# Patient Record
Sex: FEMALE | Race: WHITE | NOT HISPANIC OR LATINO | Employment: UNEMPLOYED | URBAN - METROPOLITAN AREA
[De-identification: names, ages, dates, MRNs, and addresses within clinical notes are randomized per-mention and may not be internally consistent; named-entity substitution may affect disease eponyms.]

---

## 2016-02-16 LAB
HEP C HIM: NORMAL
TSH SERPL-ACNC: 0.66 UIU/ML (ref 0.45–4.5)

## 2016-11-23 LAB
ALT SERPL-CCNC: 24 U/L (ref 0–69)
AST SERPL-CCNC: 22 U/L (ref 0–66)

## 2017-01-06 ENCOUNTER — TRANSFERRED RECORDS (OUTPATIENT)
Dept: HEALTH INFORMATION MANAGEMENT | Facility: CLINIC | Age: 47
End: 2017-01-06

## 2017-01-20 ENCOUNTER — TRANSFERRED RECORDS (OUTPATIENT)
Dept: HEALTH INFORMATION MANAGEMENT | Facility: CLINIC | Age: 47
End: 2017-01-20

## 2017-02-01 LAB
CREAT SERPL-MCNC: 0.73 MG/DL (ref 0.5–1.1)
GFR SERPL CREATININE-BSD FRML MDRD: 99 ML/MIN/1.73M2
GLUCOSE SERPL-MCNC: 98 MG/DL (ref 65–99)
HBA1C MFR BLD: 5.7 % (ref 4–6)
POTASSIUM SERPL-SCNC: 4.2 MMOL/L (ref 3.5–5.3)
TSH SERPL-ACNC: 3.99 UIU/ML (ref 0.3–5)

## 2017-02-10 ENCOUNTER — TRANSFERRED RECORDS (OUTPATIENT)
Dept: HEALTH INFORMATION MANAGEMENT | Facility: CLINIC | Age: 47
End: 2017-02-10

## 2017-03-03 ENCOUNTER — TRANSFERRED RECORDS (OUTPATIENT)
Dept: HEALTH INFORMATION MANAGEMENT | Facility: CLINIC | Age: 47
End: 2017-03-03

## 2017-03-03 LAB
HPV ABSTRACT: ABNORMAL
PAP SMEAR - HIM PATIENT REPORTED: NEGATIVE

## 2017-03-21 ENCOUNTER — RECORDS - HEALTHEAST (OUTPATIENT)
Dept: ADMINISTRATIVE | Facility: OTHER | Age: 47
End: 2017-03-21

## 2017-03-23 ENCOUNTER — TRANSFERRED RECORDS (OUTPATIENT)
Dept: HEALTH INFORMATION MANAGEMENT | Facility: CLINIC | Age: 47
End: 2017-03-23

## 2017-03-27 PROBLEM — N83.209 OVARIAN CYST: Status: ACTIVE | Noted: 2017-03-27

## 2017-04-03 RX ORDER — METFORMIN HCL 500 MG
1000 TABLET, EXTENDED RELEASE 24 HR ORAL 2 TIMES DAILY WITH MEALS
COMMUNITY

## 2017-04-06 ENCOUNTER — ANESTHESIA (OUTPATIENT)
Dept: SURGERY | Facility: CLINIC | Age: 47
End: 2017-04-06
Payer: COMMERCIAL

## 2017-04-06 ENCOUNTER — HOSPITAL ENCOUNTER (OUTPATIENT)
Facility: CLINIC | Age: 47
Discharge: HOME | End: 2017-04-07
Attending: SPECIALIST | Admitting: SPECIALIST
Payer: COMMERCIAL

## 2017-04-06 ENCOUNTER — ANESTHESIA EVENT (OUTPATIENT)
Dept: SURGERY | Facility: CLINIC | Age: 47
End: 2017-04-06
Payer: COMMERCIAL

## 2017-04-06 DIAGNOSIS — N83.201 CYST OF RIGHT OVARY: Primary | ICD-10-CM

## 2017-04-06 PROBLEM — H59.89: Status: ACTIVE | Noted: 2017-04-06

## 2017-04-06 LAB
GLUCOSE BLDC GLUCOMTR-MCNC: 118 MG/DL (ref 70–99)
HCG SERPL QL: NEGATIVE

## 2017-04-06 PROCEDURE — 36000056 ZZH SURGERY LEVEL 3 1ST 30 MIN: Performed by: SPECIALIST

## 2017-04-06 PROCEDURE — 88305 TISSUE EXAM BY PATHOLOGIST: CPT | Performed by: SPECIALIST

## 2017-04-06 PROCEDURE — 84703 CHORIONIC GONADOTROPIN ASSAY: CPT | Performed by: ANESTHESIOLOGY

## 2017-04-06 PROCEDURE — 25000125 ZZHC RX 250: Performed by: NURSE ANESTHETIST, CERTIFIED REGISTERED

## 2017-04-06 PROCEDURE — 25000128 H RX IP 250 OP 636: Performed by: ANESTHESIOLOGY

## 2017-04-06 PROCEDURE — 36000058 ZZH SURGERY LEVEL 3 EA 15 ADDTL MIN: Performed by: SPECIALIST

## 2017-04-06 PROCEDURE — 37000009 ZZH ANESTHESIA TECHNICAL FEE, EACH ADDTL 15 MIN: Performed by: SPECIALIST

## 2017-04-06 PROCEDURE — 25000125 ZZHC RX 250: Performed by: SPECIALIST

## 2017-04-06 PROCEDURE — 37000008 ZZH ANESTHESIA TECHNICAL FEE, 1ST 30 MIN: Performed by: SPECIALIST

## 2017-04-06 PROCEDURE — 40000935 ZZH STATISTIC OUTPATIENT (NON-OBS) EVE

## 2017-04-06 PROCEDURE — 36415 COLL VENOUS BLD VENIPUNCTURE: CPT | Performed by: ANESTHESIOLOGY

## 2017-04-06 PROCEDURE — 40000170 ZZH STATISTIC PRE-PROCEDURE ASSESSMENT II: Performed by: SPECIALIST

## 2017-04-06 PROCEDURE — 25000125 ZZHC RX 250: Performed by: ANESTHESIOLOGY

## 2017-04-06 PROCEDURE — 25000132 ZZH RX MED GY IP 250 OP 250 PS 637: Performed by: SPECIALIST

## 2017-04-06 PROCEDURE — 25000128 H RX IP 250 OP 636: Performed by: NURSE ANESTHETIST, CERTIFIED REGISTERED

## 2017-04-06 PROCEDURE — 71000012 ZZH RECOVERY PHASE 1 LEVEL 1 FIRST HR: Performed by: SPECIALIST

## 2017-04-06 PROCEDURE — 88305 TISSUE EXAM BY PATHOLOGIST: CPT | Mod: 26 | Performed by: SPECIALIST

## 2017-04-06 PROCEDURE — 25800025 ZZH RX 258: Performed by: NURSE ANESTHETIST, CERTIFIED REGISTERED

## 2017-04-06 PROCEDURE — 82962 GLUCOSE BLOOD TEST: CPT

## 2017-04-06 PROCEDURE — 40000936 ZZH STATISTIC OUTPATIENT (NON-OBS) NIGHT

## 2017-04-06 PROCEDURE — 25000132 ZZH RX MED GY IP 250 OP 250 PS 637: Performed by: ANESTHESIOLOGY

## 2017-04-06 PROCEDURE — 27210794 ZZH OR GENERAL SUPPLY STERILE: Performed by: SPECIALIST

## 2017-04-06 PROCEDURE — 25000128 H RX IP 250 OP 636: Performed by: SPECIALIST

## 2017-04-06 PROCEDURE — 71000013 ZZH RECOVERY PHASE 1 LEVEL 1 EA ADDTL HR: Performed by: SPECIALIST

## 2017-04-06 PROCEDURE — 25000566 ZZH SEVOFLURANE, EA 15 MIN: Performed by: SPECIALIST

## 2017-04-06 PROCEDURE — 25000132 ZZH RX MED GY IP 250 OP 250 PS 637

## 2017-04-06 RX ORDER — OXYCODONE HYDROCHLORIDE 5 MG/1
5 TABLET ORAL ONCE
Status: COMPLETED | OUTPATIENT
Start: 2017-04-06 | End: 2017-04-06

## 2017-04-06 RX ORDER — OXYCODONE AND ACETAMINOPHEN 5; 325 MG/1; MG/1
1-2 TABLET ORAL EVERY 4 HOURS PRN
Status: DISCONTINUED | OUTPATIENT
Start: 2017-04-06 | End: 2017-04-07 | Stop reason: HOSPADM

## 2017-04-06 RX ORDER — PROPOFOL 10 MG/ML
INJECTION, EMULSION INTRAVENOUS CONTINUOUS PRN
Status: DISCONTINUED | OUTPATIENT
Start: 2017-04-06 | End: 2017-04-06

## 2017-04-06 RX ORDER — METFORMIN HCL 500 MG
1000 TABLET, EXTENDED RELEASE 24 HR ORAL 2 TIMES DAILY WITH MEALS
Status: DISCONTINUED | OUTPATIENT
Start: 2017-04-06 | End: 2017-04-07 | Stop reason: HOSPADM

## 2017-04-06 RX ORDER — ONDANSETRON 2 MG/ML
INJECTION INTRAMUSCULAR; INTRAVENOUS PRN
Status: DISCONTINUED | OUTPATIENT
Start: 2017-04-06 | End: 2017-04-06

## 2017-04-06 RX ORDER — DEXAMETHASONE SODIUM PHOSPHATE 4 MG/ML
INJECTION, SOLUTION INTRA-ARTICULAR; INTRALESIONAL; INTRAMUSCULAR; INTRAVENOUS; SOFT TISSUE PRN
Status: DISCONTINUED | OUTPATIENT
Start: 2017-04-06 | End: 2017-04-06

## 2017-04-06 RX ORDER — MEPERIDINE HYDROCHLORIDE 25 MG/ML
12.5 INJECTION INTRAMUSCULAR; INTRAVENOUS; SUBCUTANEOUS
Status: DISCONTINUED | OUTPATIENT
Start: 2017-04-06 | End: 2017-04-06

## 2017-04-06 RX ORDER — FENTANYL CITRATE 50 UG/ML
25-50 INJECTION, SOLUTION INTRAMUSCULAR; INTRAVENOUS
Status: DISCONTINUED | OUTPATIENT
Start: 2017-04-06 | End: 2017-04-06

## 2017-04-06 RX ORDER — FENTANYL CITRATE 50 UG/ML
25-50 INJECTION, SOLUTION INTRAMUSCULAR; INTRAVENOUS EVERY 5 MIN PRN
Status: DISCONTINUED | OUTPATIENT
Start: 2017-04-06 | End: 2017-04-06 | Stop reason: HOSPADM

## 2017-04-06 RX ORDER — KETOROLAC TROMETHAMINE 30 MG/ML
30 INJECTION, SOLUTION INTRAMUSCULAR; INTRAVENOUS EVERY 6 HOURS PRN
Status: DISCONTINUED | OUTPATIENT
Start: 2017-04-06 | End: 2017-04-06

## 2017-04-06 RX ORDER — KETAMINE HYDROCHLORIDE 10 MG/ML
10 INJECTION, SOLUTION INTRAMUSCULAR; INTRAVENOUS ONCE
Status: COMPLETED | OUTPATIENT
Start: 2017-04-06 | End: 2017-04-06

## 2017-04-06 RX ORDER — FENTANYL CITRATE 50 UG/ML
INJECTION, SOLUTION INTRAMUSCULAR; INTRAVENOUS PRN
Status: DISCONTINUED | OUTPATIENT
Start: 2017-04-06 | End: 2017-04-06

## 2017-04-06 RX ORDER — KETOROLAC TROMETHAMINE 30 MG/ML
30 INJECTION, SOLUTION INTRAMUSCULAR; INTRAVENOUS EVERY 6 HOURS PRN
Status: DISCONTINUED | OUTPATIENT
Start: 2017-04-06 | End: 2017-04-07 | Stop reason: HOSPADM

## 2017-04-06 RX ORDER — ONDANSETRON 2 MG/ML
4 INJECTION INTRAMUSCULAR; INTRAVENOUS EVERY 6 HOURS PRN
Status: DISCONTINUED | OUTPATIENT
Start: 2017-04-06 | End: 2017-04-07 | Stop reason: HOSPADM

## 2017-04-06 RX ORDER — OXYCODONE AND ACETAMINOPHEN 5; 325 MG/1; MG/1
1-2 TABLET ORAL
Status: DISCONTINUED | OUTPATIENT
Start: 2017-04-06 | End: 2017-04-06

## 2017-04-06 RX ORDER — SODIUM CHLORIDE, SODIUM LACTATE, POTASSIUM CHLORIDE, CALCIUM CHLORIDE 600; 310; 30; 20 MG/100ML; MG/100ML; MG/100ML; MG/100ML
INJECTION, SOLUTION INTRAVENOUS CONTINUOUS PRN
Status: DISCONTINUED | OUTPATIENT
Start: 2017-04-06 | End: 2017-04-06

## 2017-04-06 RX ORDER — HYDROMORPHONE HYDROCHLORIDE 1 MG/ML
0.2 INJECTION, SOLUTION INTRAMUSCULAR; INTRAVENOUS; SUBCUTANEOUS
Status: DISCONTINUED | OUTPATIENT
Start: 2017-04-06 | End: 2017-04-07 | Stop reason: HOSPADM

## 2017-04-06 RX ORDER — GLYCOPYRROLATE 0.2 MG/ML
INJECTION, SOLUTION INTRAMUSCULAR; INTRAVENOUS PRN
Status: DISCONTINUED | OUTPATIENT
Start: 2017-04-06 | End: 2017-04-06

## 2017-04-06 RX ORDER — LIDOCAINE HYDROCHLORIDE 20 MG/ML
INJECTION, SOLUTION INFILTRATION; PERINEURAL PRN
Status: DISCONTINUED | OUTPATIENT
Start: 2017-04-06 | End: 2017-04-06

## 2017-04-06 RX ORDER — NALOXONE HYDROCHLORIDE 0.4 MG/ML
.1-.4 INJECTION, SOLUTION INTRAMUSCULAR; INTRAVENOUS; SUBCUTANEOUS
Status: DISCONTINUED | OUTPATIENT
Start: 2017-04-06 | End: 2017-04-06

## 2017-04-06 RX ORDER — OXYCODONE AND ACETAMINOPHEN 5; 325 MG/1; MG/1
1-2 TABLET ORAL EVERY 4 HOURS PRN
Qty: 20 TABLET | Refills: 0 | Status: SHIPPED | OUTPATIENT
Start: 2017-04-06 | End: 2018-09-19

## 2017-04-06 RX ORDER — KETOROLAC TROMETHAMINE 30 MG/ML
INJECTION, SOLUTION INTRAMUSCULAR; INTRAVENOUS PRN
Status: DISCONTINUED | OUTPATIENT
Start: 2017-04-06 | End: 2017-04-06

## 2017-04-06 RX ORDER — ONDANSETRON 4 MG/1
4 TABLET, ORALLY DISINTEGRATING ORAL EVERY 6 HOURS PRN
Status: DISCONTINUED | OUTPATIENT
Start: 2017-04-06 | End: 2017-04-07 | Stop reason: HOSPADM

## 2017-04-06 RX ORDER — NEOSTIGMINE METHYLSULFATE 1 MG/ML
VIAL (ML) INJECTION PRN
Status: DISCONTINUED | OUTPATIENT
Start: 2017-04-06 | End: 2017-04-06

## 2017-04-06 RX ORDER — BUPIVACAINE HYDROCHLORIDE AND EPINEPHRINE 5; 5 MG/ML; UG/ML
INJECTION, SOLUTION PERINEURAL PRN
Status: DISCONTINUED | OUTPATIENT
Start: 2017-04-06 | End: 2017-04-06 | Stop reason: HOSPADM

## 2017-04-06 RX ORDER — PROPOFOL 10 MG/ML
INJECTION, EMULSION INTRAVENOUS PRN
Status: DISCONTINUED | OUTPATIENT
Start: 2017-04-06 | End: 2017-04-06

## 2017-04-06 RX ORDER — NALOXONE HYDROCHLORIDE 0.4 MG/ML
.1-.4 INJECTION, SOLUTION INTRAMUSCULAR; INTRAVENOUS; SUBCUTANEOUS
Status: DISCONTINUED | OUTPATIENT
Start: 2017-04-06 | End: 2017-04-07 | Stop reason: HOSPADM

## 2017-04-06 RX ORDER — ONDANSETRON 4 MG/1
4 TABLET, ORALLY DISINTEGRATING ORAL EVERY 30 MIN PRN
Status: DISCONTINUED | OUTPATIENT
Start: 2017-04-06 | End: 2017-04-06

## 2017-04-06 RX ORDER — BUPIVACAINE HYDROCHLORIDE AND EPINEPHRINE 5; 5 MG/ML; UG/ML
INJECTION, SOLUTION EPIDURAL; INTRACAUDAL; PERINEURAL
Status: DISCONTINUED
Start: 2017-04-06 | End: 2017-04-06 | Stop reason: HOSPADM

## 2017-04-06 RX ORDER — ACETAMINOPHEN 10 MG/ML
1000 INJECTION, SOLUTION INTRAVENOUS ONCE
Status: COMPLETED | OUTPATIENT
Start: 2017-04-06 | End: 2017-04-06

## 2017-04-06 RX ORDER — SODIUM CHLORIDE, SODIUM LACTATE, POTASSIUM CHLORIDE, CALCIUM CHLORIDE 600; 310; 30; 20 MG/100ML; MG/100ML; MG/100ML; MG/100ML
INJECTION, SOLUTION INTRAVENOUS CONTINUOUS
Status: DISCONTINUED | OUTPATIENT
Start: 2017-04-06 | End: 2017-04-06

## 2017-04-06 RX ORDER — HYDROXYZINE HYDROCHLORIDE 25 MG/1
50 TABLET, FILM COATED ORAL ONCE
Status: COMPLETED | OUTPATIENT
Start: 2017-04-06 | End: 2017-04-06

## 2017-04-06 RX ORDER — IBUPROFEN 600 MG/1
600 TABLET, FILM COATED ORAL
Status: DISCONTINUED | OUTPATIENT
Start: 2017-04-06 | End: 2017-04-06

## 2017-04-06 RX ORDER — ONDANSETRON 2 MG/ML
4 INJECTION INTRAMUSCULAR; INTRAVENOUS EVERY 30 MIN PRN
Status: DISCONTINUED | OUTPATIENT
Start: 2017-04-06 | End: 2017-04-06

## 2017-04-06 RX ORDER — SODIUM CHLORIDE, SODIUM LACTATE, POTASSIUM CHLORIDE, CALCIUM CHLORIDE 600; 310; 30; 20 MG/100ML; MG/100ML; MG/100ML; MG/100ML
INJECTION, SOLUTION INTRAVENOUS CONTINUOUS
Status: DISCONTINUED | OUTPATIENT
Start: 2017-04-06 | End: 2017-04-07 | Stop reason: HOSPADM

## 2017-04-06 RX ORDER — LEVOTHYROXINE SODIUM 112 UG/1
112 TABLET ORAL
Status: DISCONTINUED | OUTPATIENT
Start: 2017-04-07 | End: 2017-04-07 | Stop reason: HOSPADM

## 2017-04-06 RX ADMIN — FENTANYL CITRATE 50 MCG: 50 INJECTION, SOLUTION INTRAMUSCULAR; INTRAVENOUS at 11:37

## 2017-04-06 RX ADMIN — GLYCOPYRROLATE 0.9 MG: 0.2 INJECTION, SOLUTION INTRAMUSCULAR; INTRAVENOUS at 12:12

## 2017-04-06 RX ADMIN — PROPOFOL 230 MG: 10 INJECTION, EMULSION INTRAVENOUS at 11:24

## 2017-04-06 RX ADMIN — PROPOFOL 200 MCG/KG/MIN: 10 INJECTION, EMULSION INTRAVENOUS at 11:24

## 2017-04-06 RX ADMIN — HYDROMORPHONE HYDROCHLORIDE 0.5 MG: 1 INJECTION, SOLUTION INTRAMUSCULAR; INTRAVENOUS; SUBCUTANEOUS at 13:01

## 2017-04-06 RX ADMIN — ROCURONIUM BROMIDE 40 MG: 10 INJECTION INTRAVENOUS at 11:24

## 2017-04-06 RX ADMIN — NEOSTIGMINE METHYLSULFATE 5 MG: 1 INJECTION INTRAMUSCULAR; INTRAVENOUS; SUBCUTANEOUS at 12:12

## 2017-04-06 RX ADMIN — HYDROXYZINE HYDROCHLORIDE 50 MG: 25 TABLET ORAL at 12:55

## 2017-04-06 RX ADMIN — METFORMIN HYDROCHLORIDE 1000 MG: 500 TABLET, EXTENDED RELEASE ORAL at 18:16

## 2017-04-06 RX ADMIN — ONDANSETRON 4 MG: 2 INJECTION INTRAMUSCULAR; INTRAVENOUS at 12:16

## 2017-04-06 RX ADMIN — KETOROLAC TROMETHAMINE 30 MG: 30 INJECTION, SOLUTION INTRAMUSCULAR at 18:16

## 2017-04-06 RX ADMIN — MIDAZOLAM HYDROCHLORIDE 2 MG: 1 INJECTION, SOLUTION INTRAMUSCULAR; INTRAVENOUS at 11:21

## 2017-04-06 RX ADMIN — HYDROMORPHONE HYDROCHLORIDE 0.5 MG: 1 INJECTION, SOLUTION INTRAMUSCULAR; INTRAVENOUS; SUBCUTANEOUS at 12:44

## 2017-04-06 RX ADMIN — DEXAMETHASONE SODIUM PHOSPHATE 4 MG: 4 INJECTION, SOLUTION INTRAMUSCULAR; INTRAVENOUS at 11:32

## 2017-04-06 RX ADMIN — FENTANYL CITRATE 50 MCG: 50 INJECTION, SOLUTION INTRAMUSCULAR; INTRAVENOUS at 12:42

## 2017-04-06 RX ADMIN — KETOROLAC TROMETHAMINE 30 MG: 30 INJECTION, SOLUTION INTRAMUSCULAR at 12:12

## 2017-04-06 RX ADMIN — FENTANYL CITRATE 50 MCG: 50 INJECTION, SOLUTION INTRAMUSCULAR; INTRAVENOUS at 11:21

## 2017-04-06 RX ADMIN — HYDROMORPHONE HYDROCHLORIDE 0.2 MG: 1 INJECTION, SOLUTION INTRAMUSCULAR; INTRAVENOUS; SUBCUTANEOUS at 17:16

## 2017-04-06 RX ADMIN — FENTANYL CITRATE 50 MCG: 50 INJECTION, SOLUTION INTRAMUSCULAR; INTRAVENOUS at 12:56

## 2017-04-06 RX ADMIN — OXYCODONE HYDROCHLORIDE AND ACETAMINOPHEN 2 TABLET: 5; 325 TABLET ORAL at 19:36

## 2017-04-06 RX ADMIN — HYDROMORPHONE HYDROCHLORIDE 0.5 MG: 1 INJECTION, SOLUTION INTRAMUSCULAR; INTRAVENOUS; SUBCUTANEOUS at 13:42

## 2017-04-06 RX ADMIN — FENTANYL CITRATE 25 MCG: 50 INJECTION, SOLUTION INTRAMUSCULAR; INTRAVENOUS at 11:54

## 2017-04-06 RX ADMIN — OXYCODONE HYDROCHLORIDE 5 MG: 5 TABLET ORAL at 14:32

## 2017-04-06 RX ADMIN — FENTANYL CITRATE 25 MCG: 50 INJECTION, SOLUTION INTRAMUSCULAR; INTRAVENOUS at 11:44

## 2017-04-06 RX ADMIN — PROPOFOL 40 MG: 10 INJECTION, EMULSION INTRAVENOUS at 11:57

## 2017-04-06 RX ADMIN — Medication 2 LOZENGE: at 22:12

## 2017-04-06 RX ADMIN — Medication 5 MG: at 14:33

## 2017-04-06 RX ADMIN — ROCURONIUM BROMIDE 10 MG: 10 INJECTION INTRAVENOUS at 11:54

## 2017-04-06 RX ADMIN — SODIUM CHLORIDE, POTASSIUM CHLORIDE, SODIUM LACTATE AND CALCIUM CHLORIDE: 600; 310; 30; 20 INJECTION, SOLUTION INTRAVENOUS at 11:20

## 2017-04-06 RX ADMIN — ACETAMINOPHEN 1000 MG: 10 INJECTION, SOLUTION INTRAVENOUS at 12:55

## 2017-04-06 RX ADMIN — LIDOCAINE HYDROCHLORIDE 80 MG: 20 INJECTION, SOLUTION INFILTRATION; PERINEURAL at 11:24

## 2017-04-06 RX ADMIN — SODIUM CHLORIDE, POTASSIUM CHLORIDE, SODIUM LACTATE AND CALCIUM CHLORIDE: 600; 310; 30; 20 INJECTION, SOLUTION INTRAVENOUS at 12:27

## 2017-04-06 ASSESSMENT — PAIN DESCRIPTION - DESCRIPTORS
DESCRIPTORS: ACHING
DESCRIPTORS: DULL

## 2017-04-06 NOTE — IP AVS SNAPSHOT
[facility]    [address] [place] [address]    Phone:  [phone]                                       After Visit Summary   4/6/2017    Socorro Etienne    MRN: [id number]           After Visit Summary Signature Page     I have received my discharge instructions, and my questions have been answered. I have discussed any challenges I see with this plan with the nurse or doctor.    ..........................................................................................................................................  Patient/Patient Representative Signature      ..........................................................................................................................................  Patient Representative Print Name and Relationship to Patient    ..................................................               ................................................  Date                                            Time    ..........................................................................................................................................  Reviewed by Signature/Title    ...................................................              ..............................................  Date                                                            Time

## 2017-04-06 NOTE — OR NURSING
MDA consulted on pain status.  5mg ketamine given, resulting in decreased respirations.  Pain down to 5.  PO pain med given.  Mother updated again.

## 2017-04-06 NOTE — ANESTHESIA CARE TRANSFER NOTE
Patient: Socorro Etienne    Procedure(s):  LAPAROSCOPIC RIGHT SALPINGO-OOPHORECTOMY AND LAPAROSCOPIC LEFT SALPINGECTOMY  - Wound Class: II-Clean Contaminated   - Wound Class: II-Clean Contaminated    Diagnosis: RIGHT OVARIAN CYST   Diagnosis Additional Information: No value filed.    Anesthesia Type:   General, ETT     Note:  Airway :Face Mask  Patient transferred to:PACU  Comments: Patient spontaneously breathing and following commands. -400. Extubated to facemask. VSS. Report given to RN.      Vitals: (Last set prior to Anesthesia Care Transfer)    CRNA VITALS  4/6/2017 1157 - 4/6/2017 1233      4/6/2017             Pulse: 89    SpO2: 96 %    Resp Rate (observed): (!)  6                Electronically Signed By: KEKE Rosas CRNA  April 6, 2017  12:33 PM

## 2017-04-06 NOTE — PLAN OF CARE
Problem: Goal Outcome Summary  Goal: Goal Outcome Summary  Outcome: No Change  A&Ox4. Not OOB since admission to OBS. Tolerating regular diet, . BSS on RA. C/o 5/10 L abd pain, given dilaudid & toradol prn. 3 lap sites C/D/I. No vaginal bleeding noted. LR @ 100ml/hr. Voided in PACU prior to arrival.

## 2017-04-06 NOTE — ANESTHESIA POSTPROCEDURE EVALUATION
Patient: Socorro Etienne    Procedure(s):  LAPAROSCOPIC RIGHT SALPINGO-OOPHORECTOMY AND LAPAROSCOPIC LEFT SALPINGECTOMY  - Wound Class: II-Clean Contaminated   - Wound Class: II-Clean Contaminated    Diagnosis:RIGHT OVARIAN CYST   Diagnosis Additional Information: No value filed.    Anesthesia Type:  General, ETT    Note:  Anesthesia Post Evaluation    Patient location during evaluation: PACU  Patient participation: Able to fully participate in evaluation  Level of consciousness: awake  Pain management: adequate  Airway patency: patent  Cardiovascular status: acceptable  Respiratory status: acceptable  Hydration status: acceptable  PONV: none     Anesthetic complications: None          Last vitals:  Vitals:    04/06/17 1020   BP: 158/87   Resp: 16   Temp: 36.9  C (98.5  F)   SpO2: 98%         Electronically Signed By: Bry Martin MD  April 6, 2017  1:00 PM

## 2017-04-06 NOTE — ANESTHESIA PREPROCEDURE EVALUATION
Anesthesia Evaluation     . Pt has had prior anesthetic. Type: General    No history of anesthetic complications          ROS/MED HX    ENT/Pulmonary:  - neg pulmonary ROS     Neurologic:       Cardiovascular:     (+) hypertension----. : . . . :. .       METS/Exercise Tolerance:     Hematologic:         Musculoskeletal:         GI/Hepatic:  - neg GI/hepatic ROS       Renal/Genitourinary:         Endo:     (+) thyroid problem hypothyroidism, Obesity, Other Endocrine Disorder pituitary adenoma.      Psychiatric:         Infectious Disease:         Malignancy:         Other:                     Physical Exam  Normal systems: cardiovascular, pulmonary and dental    Airway   Mallampati: II  TM distance: >3 FB  Neck ROM: full    Dental     Cardiovascular   Rhythm and rate: regular and normal      Pulmonary    breath sounds clear to auscultation                    Anesthesia Plan      History & Physical Review  History and physical reviewed and following examination; no interval change.    ASA Status:  2 .    NPO Status:  > 8 hours    Plan for General and ETT with Propofol induction. Maintenance will be TIVA.    PONV prophylaxis:  Ondansetron (or other 5HT-3) and Dexamethasone or Solumedrol       Postoperative Care  Postoperative pain management:  IV analgesics and Oral pain medications.      Consents  Anesthetic plan, risks, benefits and alternatives discussed with:  Patient..                          .

## 2017-04-06 NOTE — PROCEDURES
OP NOTE    Preop diagnosis:  1.RLQ pain  2.Right ovarian cyst    Postop diagnosis: Same     Procedure: Diagnostic laparoscopy, Right salpingo-oophorectomy, Left salpingectomy    Physician: Muriel Purcell. Tanisha Kelly    Anesthetic: GET    Findings: Normal upper abdomen, uterus and tubes. Left ovary normal, right enlarged with a 3 cm fluid-filled cyst    Preoperative status: Socorro Etienne is a 46 year old who presents for laparoscopy with complaints of RLQ pain and an US that showed a 3 cm cystic mass on the right ovary. Risks and procedure for laparoscopy were discussed with the patient and she desired to proceed.    Surgical procedure:  The patient was brought to the OR. She was induced under general anesthesia and placed in the dorsal lithotomy position. The abdomen, perineum and vagina were prepped and draped in the usual manner. The bladder was emptied. Time Out was performed.  A speculum was placed into the vagina and the cervix grasped with a tenaculum. A Andrew cannula was inserted into the cervix and the tenaculum removed.  5 cc of 0.5% Marcaine was injected into the umbilicus and an incision performed. The Step Veress needle was inserted into the peritoneal cavity with confirmation of placement by the saline hanging drop test. Pneumoperitoneum was instilled with an opening pressure of 8 mm Hg. After pneumoperitoneum was established, the Veress needle was removed. The 5 mm port was inserted with confirmation of placement by direct visualization. The abdomen and pelvis were examined with findings as noted above. An additional port 5 mm was placed after Marcaine infiltration in the RLQ and an 11 mm port in the LLQ under direct vision.  The left tube was elevated and removed with cutting cautery. The right ureter was identified and the right adnexa elevated and removed with cutting cautery.   The abdomen and pelvis were examined with findings of no thermal injury and good hemostasis.The RLQ incision was closed  with a single stitch of 0 Vicryl.  Pneumoperitoneum was expelled and the operative sheaths removed.  The skin incisions were closed with simple subcuticular sutures of 4.0 Vicryl. The patient was repositioned, reawakened, extubated and brought to the PACU in good condition.    EBL: 10 cc     Pathology specimens: Right tube and ovary, Left tube    Complications: None

## 2017-04-06 NOTE — IP AVS SNAPSHOT
MRN:[id number]                      After Visit Summary   4/6/2017    Socorro Etienne    MRN: [id number]           Thank you!     Thank you for choosing [facility] for your care. Our goal is always to provide you with excellent care. Hearing back from our patients is one way we can continue to improve our services. Please take a few minutes to complete the written survey that you may receive in the mail after you visit with us. Thank you!        Patient Information     Date Of Birth          1970        About your hospital stay     You were [facility] on:  April 6, 2017 [facility]    You were discharged on:  April 7, 2017       Who to Call     For medical emergencies, please call 911.  For non-urgent questions about your medical care, please call your primary care provider or clinic, [phone]  For questions related to your surgery, please call your surgery clinic        Attending Provider     Provider Specialty    Muriel Purcell MD OB/Gyn       Primary Care Provider Office Phone # Fax #    Muriel Purcell [phone]       [facility] [address] [address]        After Care Instructions     Discharge Instructions       Patient may return to work POD  2            Dressing       Keep dressing clean and dry.  Dressing / incisional care as instructed by RN and or Surgeon            Ice to affected area       Ice to operative site PRN            No alcohol       NO ALCOHOL for 24 hours post procedure            No driving or operating machinery       No driving or operating machinery until day after procedure                  Further instructions from your care team       Same Day Surgery Discharge Instructions for  Sedation and General Anesthesia       It's not unusual to feel dizzy, light-headed or faint for up to 24 hours after surgery or while taking pain medication.  If  you have these symptoms: sit for a few minutes before standing and have someone assist you when you get up to walk or use the bathroom.      You should rest and relax for the next 24 hours. We recommend you make arrangements to have an adult stay with you for at least 24 hours after your discharge.  Avoid hazardous and strenuous activity.      DO NOT DRIVE any vehicle or operate mechanical equipment for 24 hours following the end of your surgery.  Even though you may feel normal, your reactions may be affected by the medication you have received.      Do not drink alcoholic beverages for 24 hours following surgery.       Slowly progress to your regular diet as you feel able. It's not unusual to feel nauseated and/or vomit after receiving anesthesia.  If you develop these symptoms, drink clear liquids (apple juice, ginger ale, broth, 7-up, etc. ) until you feel better.  If your nausea and vomiting persists for 24 hours, please notify your surgeon.        All narcotic pain medications, along with inactivity and anesthesia, can cause constipation. Drinking plenty of liquids and increasing fiber intake will help.      For any questions of a medical nature, call your surgeon.      Do not make important decisions for 24 hours.      If you had general anesthesia, you may have a sore throat for a couple of days related to the breathing tube used during surgery.  You may use Cepacol lozenges to help with this discomfort.  If it worsens or if you develop a fever, contact your surgeon.       If you feel your pain is not well managed with the pain medications prescribed by your surgeon, please contact your surgeon's office to let them know so they can address your concerns.       LAPAROSCOPY DISCHARGE INSTRUCTIONS      ACTIVITY:    After 24 hours, you may resume normal activities including lifting as the abdominal discomfort disappears.  You may drive a car after 24 hours, as long as you are not taking narcotics.    Showers are  "perfectly acceptable.    VAGINAL DISCHARGE:   You may have some vaginal bleeding or discharge for about a week after discharge.  Tampons may be inserted into the vagina for the discharge or bleeding.    STITCHES:      There is usually a stitch under the skin in the incision, which will dissolve and does not need to be removed.  The Band-Aids may be removed at any time.      WHEN TO CALL YOUR DOCTOR:  Call your doctor right away if you have any of the following:  Fever above 100.4 F (38 C) or chills  Vaginal bleeding that soaks more than one sanitary pad per hour  A foul smelling discharge from the vagina  Difficulty urinating  Severe pain   Redness, swelling, or drainage at your incision sites      [facility]   RONAK Mcgarry M. Hanno,    LISE Blum M.Kasbohm & HANANE Kelly      [facility] Office:    [facility] Office:    [address] [place] [address]    [address] [phone]    [place], [place] [id number] [phone]    While you were at the hospital today you received Toradol, an antiinflammatory medication similar to Ibuprofen.  You should not take other antiinflammatory medication, such as Ibuprofen, Motrin, Advil, Aleve, Naprosyn, etc, until 210pm.               Pending Results     Date and Time Order Name Status Description    4/6/2017 1202 Surgical pathology exam In process             Admission Information     Date & Time Provider Department Dept. Phone    4/6/2017 Muriel Purcell MD [facility] Observation Unit [phone]      Your Vitals Were     Blood Pressure Pulse Temperature Respirations Height Weight    128/63 (BP Location: Right arm) 69 98.5  F (36.9  C) (Oral) 16 1.549 m (5' 1\") 92.2 kg (203 lb 4.2 oz)    Last Period Pulse Oximetry BMI (Body Mass Index)             03/22/2017 93% 38.41 kg/m2         MyChart Information     [facility] gives you secure access to your electronic health " record. If you see a primary care provider, you can also send messages to your care team and make appointments. If you have questions, please call your primary care clinic.  If you do not have a primary care provider, please call [phone] and they will assist you.        Care EveryWhere ID     This is your Care EveryWhere ID. This could be used by other organizations to access your [facility] medical records  [id number]           Review of your medicines      START taking        Dose / Directions    oxyCODONE-acetaminophen 5-325 MG per tablet   Commonly known as:  PERCOCET        Dose:  1-2 tablet   Take 1-2 tablets by mouth every 4 hours as needed for pain (moderate to severe)   Quantity:  20 tablet   Refills:  0         CONTINUE these medicines which have NOT CHANGED        Dose / Directions    CYTOMEL PO        Dose:  5 mcg   Take 5 mcg by mouth 2 times daily   Refills:  0       GLUCOPHAGE XR PO        Dose:  1000 mg   Take 1,000 mg by mouth 2 times daily (with meals)   Refills:  0       LINZESS PO   Notes to Patient:  Resume taking as you do at home          Dose:  290 mcg   Take 290 mcg by mouth every morning (before breakfast)   Refills:  0       * ESTRACE PO        Dose:  0.5 mg   Take 0.5 mg by mouth daily as needed   Refills:  0       * MINIVELLE 0.1 MG/24HR BIW patch   Generic drug:  estradiol   Notes to Patient:  Resume taking as you do at home          Dose:  1 patch   Place 1 patch onto the skin twice a week   Refills:  0       PARLODEL PO   Notes to Patient:  Resume taking as you do at home        Dose:  1.5 tablet   Take 1.5 tablets by mouth At Bedtime   Refills:  0       PRILOSEC PO        Dose:  20 mg   Take 20 mg by mouth every morning   Refills:  0       progesterone 200 MG capsule   Commonly known as:  PROMETRIUM        Dose:  200 mg   Take 200 mg by mouth See Admin Instructions 12 days per month   Refills:  2       SYNTHROID PO        Dose:  112 mcg   Take 112 mcg by mouth daily   Refills:   0       TIZANIDINE HCL PO        Dose:  2 mg   Take 2 mg by mouth daily as needed for muscle spasms   Refills:  0       VAGIFEM 10 MCG Tabs vaginal tablet   Generic drug:  estradiol   Notes to Patient:  Resume taking as you do at home          Dose:  10 mcg   Place 10 mcg vaginally three times a week   Refills:  0       Vitamin D3 2000 UNITS Tabs   Used for:  Vitamin D deficiency, unspecified   Notes to Patient:  Resume taking as you do at home          TAKE 1 TABLET BY MOUTH EVERY DAY. START AFTER 50,000 UNIT WEEKY SUPPLEMENT HAS FINISHED   Quantity:  100 tablet   Refills:  3       * Notice:  This list has 2 medication(s) that are the same as other medications prescribed for you. Read the directions carefully, and ask your doctor or other care provider to review them with you.         Where to get your medicines      Some of these will need a paper prescription and others can be bought over the counter. Ask your nurse if you have questions.     Bring a paper prescription for each of these medications     oxyCODONE-acetaminophen 5-325 MG per tablet                Protect others around you: Learn how to safely use, store and throw away your medicines at www.disposemymeds.org.             Medication List: This is a list of all your medications and when to take them. Check marks below indicate your daily home schedule. Keep this list as a reference.      Medications           Morning Afternoon Evening Bedtime As Needed    CYTOMEL PO   Take 5 mcg by mouth 2 times daily   Last time this was given:  5 mcg on 4/7/2017  9:29 AM                    Today 4/7               GLUCOPHAGE XR PO   Take 1,000 mg by mouth 2 times daily (with meals)   Last time this was given:  1,000 mg on 4/7/2017  9:29 AM                    Today 4/7               LINZESS PO   Take 290 mcg by mouth every morning (before breakfast)   Notes to Patient:  Resume taking as you do at home                                  * ESTRACE PO   Take 0.5 mg by mouth  daily as needed                                   * MINIVELLE 0.1 MG/24HR BIW patch   Place 1 patch onto the skin twice a week   Generic drug:  estradiol   Notes to Patient:  Resume taking as you do at home                                  oxyCODONE-acetaminophen 5-325 MG per tablet   Commonly known as:  PERCOCET   Take 1-2 tablets by mouth every 4 hours as needed for pain (moderate to severe)   Last time this was given:  2 tablets on 4/7/2017 11:27 AM                            Available again at 330pm       PARLODEL PO   Take 1.5 tablets by mouth At Bedtime   Notes to Patient:  Resume taking as you do at home                                PRILOSEC PO   Take 20 mg by mouth every morning   Last time this was given:  20 mg on 4/7/2017  6:46 AM            Tomorrow 4/8                       progesterone 200 MG capsule   Commonly known as:  PROMETRIUM   Take 200 mg by mouth See Admin Instructions 12 days per month                                SYNTHROID PO   Take 112 mcg by mouth daily   Last time this was given:  112 mcg on 4/7/2017  6:46 AM            Tomorrow 4/8                       TIZANIDINE HCL PO   Take 2 mg by mouth daily as needed for muscle spasms                                   VAGIFEM 10 MCG Tabs vaginal tablet   Place 10 mcg vaginally three times a week   Generic drug:  estradiol   Notes to Patient:  Resume taking as you do at home                                  Vitamin D3 2000 UNITS Tabs   TAKE 1 TABLET BY MOUTH EVERY DAY. START AFTER 50,000 UNIT WEEKY SUPPLEMENT HAS FINISHED   Notes to Patient:  Resume taking as you do at home                                  * Notice:  This list has 2 medication(s) that are the same as other medications prescribed for you. Read the directions carefully, and ask your doctor or other care provider to review them with you.

## 2017-04-06 NOTE — ADDENDUM NOTE
Addendum  created 04/06/17 1404 by Bry Martin MD    Anesthesia Review and Sign - Ready for Procedure

## 2017-04-06 NOTE — OR NURSING
Report called to [facility], Miri ROBINS.  Patient transferred with IV intact, and on O2 @ 2l/ NC.  Paper chart and belongings sent with patient.  Mother with patient.

## 2017-04-07 VITALS
WEIGHT: 203.26 LBS | HEART RATE: 69 BPM | SYSTOLIC BLOOD PRESSURE: 128 MMHG | HEIGHT: 61 IN | RESPIRATION RATE: 16 BRPM | DIASTOLIC BLOOD PRESSURE: 63 MMHG | OXYGEN SATURATION: 93 % | TEMPERATURE: 98.5 F | BODY MASS INDEX: 38.38 KG/M2

## 2017-04-07 LAB
COPATH REPORT: NORMAL
GLUCOSE BLDC GLUCOMTR-MCNC: 96 MG/DL (ref 70–99)

## 2017-04-07 PROCEDURE — 82962 GLUCOSE BLOOD TEST: CPT

## 2017-04-07 PROCEDURE — 25000128 H RX IP 250 OP 636: Performed by: SPECIALIST

## 2017-04-07 PROCEDURE — 25000132 ZZH RX MED GY IP 250 OP 250 PS 637: Performed by: OBSTETRICS & GYNECOLOGY

## 2017-04-07 PROCEDURE — 40000934 ZZH STATISTIC OUTPATIENT (NON-OBS) DAY

## 2017-04-07 PROCEDURE — 25000132 ZZH RX MED GY IP 250 OP 250 PS 637: Performed by: SPECIALIST

## 2017-04-07 RX ORDER — LIOTHYRONINE SODIUM 5 UG/1
5 TABLET ORAL 2 TIMES DAILY
Status: DISCONTINUED | OUTPATIENT
Start: 2017-04-07 | End: 2017-04-07 | Stop reason: HOSPADM

## 2017-04-07 RX ADMIN — OMEPRAZOLE 20 MG: 20 CAPSULE, DELAYED RELEASE ORAL at 06:46

## 2017-04-07 RX ADMIN — LIOTHYRONINE SODIUM 5 MCG: 5 TABLET ORAL at 09:29

## 2017-04-07 RX ADMIN — KETOROLAC TROMETHAMINE 30 MG: 30 INJECTION, SOLUTION INTRAMUSCULAR at 08:10

## 2017-04-07 RX ADMIN — LEVOTHYROXINE SODIUM 112 MCG: 112 TABLET ORAL at 06:46

## 2017-04-07 RX ADMIN — OXYCODONE HYDROCHLORIDE AND ACETAMINOPHEN 2 TABLET: 5; 325 TABLET ORAL at 11:27

## 2017-04-07 RX ADMIN — METFORMIN HYDROCHLORIDE 1000 MG: 500 TABLET, EXTENDED RELEASE ORAL at 09:29

## 2017-04-07 RX ADMIN — OXYCODONE HYDROCHLORIDE AND ACETAMINOPHEN 2 TABLET: 5; 325 TABLET ORAL at 07:32

## 2017-04-07 RX ADMIN — OXYCODONE HYDROCHLORIDE AND ACETAMINOPHEN 2 TABLET: 5; 325 TABLET ORAL at 01:19

## 2017-04-07 NOTE — DISCHARGE INSTRUCTIONS
Same Day Surgery Discharge Instructions for  Sedation and General Anesthesia       It's not unusual to feel dizzy, light-headed or faint for up to 24 hours after surgery or while taking pain medication.  If you have these symptoms: sit for a few minutes before standing and have someone assist you when you get up to walk or use the bathroom.      You should rest and relax for the next 24 hours. We recommend you make arrangements to have an adult stay with you for at least 24 hours after your discharge.  Avoid hazardous and strenuous activity.      DO NOT DRIVE any vehicle or operate mechanical equipment for 24 hours following the end of your surgery.  Even though you may feel normal, your reactions may be affected by the medication you have received.      Do not drink alcoholic beverages for 24 hours following surgery.       Slowly progress to your regular diet as you feel able. It's not unusual to feel nauseated and/or vomit after receiving anesthesia.  If you develop these symptoms, drink clear liquids (apple juice, ginger ale, broth, 7-up, etc. ) until you feel better.  If your nausea and vomiting persists for 24 hours, please notify your surgeon.        All narcotic pain medications, along with inactivity and anesthesia, can cause constipation. Drinking plenty of liquids and increasing fiber intake will help.      For any questions of a medical nature, call your surgeon.      Do not make important decisions for 24 hours.      If you had general anesthesia, you may have a sore throat for a couple of days related to the breathing tube used during surgery.  You may use Cepacol lozenges to help with this discomfort.  If it worsens or if you develop a fever, contact your surgeon.       If you feel your pain is not well managed with the pain medications prescribed by your surgeon, please contact your surgeon's office to let them know so they can address your concerns.       LAPAROSCOPY DISCHARGE INSTRUCTIONS       ACTIVITY:    After 24 hours, you may resume normal activities including lifting as the abdominal discomfort disappears.  You may drive a car after 24 hours, as long as you are not taking narcotics.    Showers are perfectly acceptable.    VAGINAL DISCHARGE:   You may have some vaginal bleeding or discharge for about a week after discharge.  Tampons may be inserted into the vagina for the discharge or bleeding.    STITCHES:      There is usually a stitch under the skin in the incision, which will dissolve and does not need to be removed.  The Band-Aids may be removed at any time.      WHEN TO CALL YOUR DOCTOR:  Call your doctor right away if you have any of the following:  Fever above 100.4 F (38 C) or chills  Vaginal bleeding that soaks more than one sanitary pad per hour  A foul smelling discharge from the vagina  Difficulty urinating  Severe pain   Redness, swelling, or drainage at your incision sites      [facility]   RONAK Mcgarry M. Hanno,    VALE Purcell, Windy Cardenas & HANANE Kelly      [facility] Office:    [facility] Office:    [address] [place] [address]    [address] [phone]    [place], [place] [id number] [phone]    While you were at the hospital today you received Toradol, an antiinflammatory medication similar to Ibuprofen.  You should not take other antiinflammatory medication, such as Ibuprofen, Motrin, Advil, Aleve, Naprosyn, etc, until 210pm.

## 2017-04-07 NOTE — PROGRESS NOTES
"Sore on the right side.  No flatus yet.  /63 (BP Location: Right arm)  Pulse 69  Temp 98.5  F (36.9  C) (Oral)  Resp 16  Ht 1.549 m (5' 1\")  Wt 92.2 kg (203 lb 4.2 oz)  LMP 03/22/2017  SpO2 93%  Breastfeeding? No  BMI 38.41 kg/m2  Gen:  NAD  Abd:  Moderately tender near right trochar site.  Abd is soft/ non-distended    A/P:  Post laparoscopy doing well  Ambulate and discharge if stable    aSra Majano ....................  4/7/2017   8:40 AM , pager: [phone]      "

## 2017-04-07 NOTE — PLAN OF CARE
Problem: Discharge Planning  Goal: Discharge Planning (Adult, OB, Behavioral, Peds)  Outcome: No Change  Diagnostic laparoscopy, Right salpingo-oophorectomy, Left salpingectomy  Patient is alert and orientated x 4.  Up to the BR with SBA and voided x 2.  Abdominal incisions are CDI.  Bs are hypoactive.  She is tolerating liquids well and her IV was d'cd.  Taking percocet for pain with moderate relief. No vaginal drainage.

## 2017-04-07 NOTE — PLAN OF CARE
Problem: Discharge Planning  Goal: Discharge Planning (Adult, OB, Behavioral, Peds)  Outcome: Adequate for Discharge Date Met:  04/07/17  A&Ox4. Up independently. Tolerating regular diet, BG 96. VSS on RA. C/o 5/10 L abd pain, given toradol and percocet prn. 3 lap sites C/D/I. No vaginal bleeding noted. Up to BR voiding.      Pt given discharge instructions. Questions answered. Discharge medications given and reviewed with patient. Follow up appointment to be scheduled by pt. Pt to DC home with mother.

## 2017-04-07 NOTE — PLAN OF CARE
Problem: Discharge Planning  Goal: Discharge Planning (Adult, OB, Behavioral, Peds)  Outcome: No Change  Patient continues A & O x 4.  Up independently in room and voids in BR.  Denies pain.  Left chest site is CDI and also under her left breast dressing is CDI.    Telemetry 100% A-paced with PAC.

## 2017-04-07 NOTE — PLAN OF CARE
Problem: Discharge Planning  Goal: Discharge Planning (Adult, OB, Behavioral, Peds)  Outcome: No Change  Patient is alert and orientated x 4.  Up to BR with SBA.  Abdominal lap sites x 3 all CDI.  Scant vaginal drainage.  Pain minimal and managed well with percocet.

## 2017-04-11 ENCOUNTER — APPOINTMENT (OUTPATIENT)
Dept: CT IMAGING | Facility: CLINIC | Age: 47
End: 2017-04-11
Attending: EMERGENCY MEDICINE
Payer: COMMERCIAL

## 2017-04-11 ENCOUNTER — HOSPITAL ENCOUNTER (EMERGENCY)
Facility: CLINIC | Age: 47
Discharge: HOME | End: 2017-04-11
Attending: EMERGENCY MEDICINE | Admitting: EMERGENCY MEDICINE
Payer: COMMERCIAL

## 2017-04-11 VITALS
RESPIRATION RATE: 24 BRPM | OXYGEN SATURATION: 98 % | SYSTOLIC BLOOD PRESSURE: 141 MMHG | HEART RATE: 100 BPM | DIASTOLIC BLOOD PRESSURE: 103 MMHG | TEMPERATURE: 98.4 F

## 2017-04-11 DIAGNOSIS — R07.9 CHEST PAIN, UNSPECIFIED TYPE: ICD-10-CM

## 2017-04-11 DIAGNOSIS — G89.18 POSTOPERATIVE PAIN: ICD-10-CM

## 2017-04-11 LAB
ALBUMIN SERPL-MCNC: 3.4 G/DL (ref 3.4–5)
ALP SERPL-CCNC: 88 U/L (ref 40–150)
ALT SERPL W P-5'-P-CCNC: 25 U/L (ref 0–50)
ANION GAP SERPL CALCULATED.3IONS-SCNC: 6 MMOL/L (ref 3–14)
AST SERPL W P-5'-P-CCNC: 15 U/L (ref 0–45)
BASOPHILS # BLD AUTO: 0 10E9/L (ref 0–0.2)
BASOPHILS NFR BLD AUTO: 0.5 %
BILIRUB SERPL-MCNC: 0.2 MG/DL (ref 0.2–1.3)
BUN SERPL-MCNC: 10 MG/DL (ref 7–30)
CALCIUM SERPL-MCNC: 8.9 MG/DL (ref 8.5–10.1)
CHLORIDE SERPL-SCNC: 106 MMOL/L (ref 94–109)
CO2 SERPL-SCNC: 28 MMOL/L (ref 20–32)
CREAT SERPL-MCNC: 0.64 MG/DL (ref 0.52–1.04)
DIFFERENTIAL METHOD BLD: NORMAL
EOSINOPHIL # BLD AUTO: 0.2 10E9/L (ref 0–0.7)
EOSINOPHIL NFR BLD AUTO: 2.5 %
ERYTHROCYTE [DISTWIDTH] IN BLOOD BY AUTOMATED COUNT: 15 % (ref 10–15)
GFR SERPL CREATININE-BSD FRML MDRD: ABNORMAL ML/MIN/1.7M2
GLUCOSE SERPL-MCNC: 116 MG/DL (ref 70–99)
HCT VFR BLD AUTO: 41.3 % (ref 35–47)
HGB BLD-MCNC: 13.3 G/DL (ref 11.7–15.7)
IMM GRANULOCYTES # BLD: 0 10E9/L (ref 0–0.4)
IMM GRANULOCYTES NFR BLD: 0.2 %
INTERPRETATION ECG - MUSE: NORMAL
LIPASE SERPL-CCNC: 118 U/L (ref 73–393)
LYMPHOCYTES # BLD AUTO: 2.4 10E9/L (ref 0.8–5.3)
LYMPHOCYTES NFR BLD AUTO: 28.1 %
MCH RBC QN AUTO: 26.9 PG (ref 26.5–33)
MCHC RBC AUTO-ENTMCNC: 32.2 G/DL (ref 31.5–36.5)
MCV RBC AUTO: 83 FL (ref 78–100)
MONOCYTES # BLD AUTO: 0.6 10E9/L (ref 0–1.3)
MONOCYTES NFR BLD AUTO: 6.9 %
NEUTROPHILS # BLD AUTO: 5.2 10E9/L (ref 1.6–8.3)
NEUTROPHILS NFR BLD AUTO: 61.8 %
NRBC # BLD AUTO: 0 10*3/UL
NRBC BLD AUTO-RTO: 0 /100
PLATELET # BLD AUTO: 369 10E9/L (ref 150–450)
POTASSIUM SERPL-SCNC: 4.2 MMOL/L (ref 3.4–5.3)
PROT SERPL-MCNC: 7.2 G/DL (ref 6.8–8.8)
RBC # BLD AUTO: 4.95 10E12/L (ref 3.8–5.2)
SODIUM SERPL-SCNC: 140 MMOL/L (ref 133–144)
TROPONIN I SERPL-MCNC: NORMAL UG/L (ref 0–0.04)
WBC # BLD AUTO: 8.4 10E9/L (ref 4–11)

## 2017-04-11 PROCEDURE — 84484 ASSAY OF TROPONIN QUANT: CPT | Performed by: EMERGENCY MEDICINE

## 2017-04-11 PROCEDURE — 71260 CT THORAX DX C+: CPT

## 2017-04-11 PROCEDURE — 99285 EMERGENCY DEPT VISIT HI MDM: CPT | Mod: 25

## 2017-04-11 PROCEDURE — 83690 ASSAY OF LIPASE: CPT | Performed by: EMERGENCY MEDICINE

## 2017-04-11 PROCEDURE — 25000125 ZZHC RX 250: Performed by: EMERGENCY MEDICINE

## 2017-04-11 PROCEDURE — 74177 CT ABD & PELVIS W/CONTRAST: CPT

## 2017-04-11 PROCEDURE — 80053 COMPREHEN METABOLIC PANEL: CPT | Performed by: EMERGENCY MEDICINE

## 2017-04-11 PROCEDURE — 85025 COMPLETE CBC W/AUTO DIFF WBC: CPT | Performed by: EMERGENCY MEDICINE

## 2017-04-11 PROCEDURE — 25500064 ZZH RX 255 OP 636: Performed by: EMERGENCY MEDICINE

## 2017-04-11 PROCEDURE — 93005 ELECTROCARDIOGRAM TRACING: CPT

## 2017-04-11 RX ORDER — IOPAMIDOL 755 MG/ML
102 INJECTION, SOLUTION INTRAVASCULAR ONCE
Status: COMPLETED | OUTPATIENT
Start: 2017-04-11 | End: 2017-04-11

## 2017-04-11 RX ADMIN — SODIUM CHLORIDE 96 ML: 9 INJECTION, SOLUTION INTRAVENOUS at 16:15

## 2017-04-11 RX ADMIN — IOPAMIDOL 102 ML: 755 INJECTION, SOLUTION INTRAVENOUS at 16:15

## 2017-04-11 ASSESSMENT — ENCOUNTER SYMPTOMS
LIGHT-HEADEDNESS: 0
SHORTNESS OF BREATH: 1
ABDOMINAL PAIN: 1
COUGH: 0
FEVER: 0

## 2017-04-11 NOTE — ED AVS SNAPSHOT
Emergency Department    [address] [facility] [address]    Phone:  [phone]    Fax:  [phone]                                       Socorro Etienne   MRN: [id number]    Department:   Emergency Department   Date of Visit:  4/11/2017           Patient Information     Date Of Birth          1970        Your diagnoses for this visit were:     Chest pain, unspecified type     Postoperative pain        You were seen by Mateo Blount MD.      Follow-up Information     Schedule an appointment as soon as possible for a visit with Muriel Purcell MD.    Specialty:  OB/Gyn    Contact information:    [facility]  [address] [facility] [address]  [facility] [id number] [phone]          Follow up with  Emergency Department.    Specialty:  EMERGENCY MEDICINE    Why:  If symptoms worsen    Contact information:    [address] [facility] [id number] [phone]        Discharge Instructions       Discharge Instructions  Chest Pain    You have been seen today for chest pain or discomfort.  At this time, your doctor has found no signs that your chest pain is due to a serious or life-threatening condition, (or you have declined more testing and/or admission to the hospital). However, sometimes there is a serious problem that does not show up right away. Your evaluation today may not be complete and you may need further testing and evaluation.     You need to follow-up with your regular doctor within 3 days.    Return to the Emergency Department if:    Your chest pain changes, gets worse, starts to happen more often, or comes with less activity.    You are short of breath.    You get very weak or tired.    You pass out or faint.    You have any new symptoms, like fever, cough, numb legs, or you cough up blood.    You have anything else that worries you.    Until you follow-up with your regular doctor please do the following:    Take one aspirin daily  unless you have an allergy or are told not to by your doctor.    If a stress test appointment has been made, go to the appointment.    If you have questions, contact your regular doctor.    If your doctor today has told you to follow-up with your regular doctor, it is very important that you make an appointment with your clinic and go to the appointment.  If you do not follow-up with your primary doctor, it may result in missing an important development which could result in permanent injury or disability and/or lasting pain.  If there is any problem keeping your appointment, call your doctor or return to the Emergency Department.    If you were given a prescription for medicine here today, be sure to read all of the information (including the package insert) that comes with your prescription.  This will include important information about the medicine, its side effects, and any warnings that you need to know about.  The pharmacist who fills the prescription can provide more information and answer questions you may have about the medicine.  If you have questions or concerns that the pharmacist cannot address, please call or return to the Emergency Department.     Opioid Medication Information    Pain medications are among the most commonly prescribed medicines, so we are including this information for all our patients. If you did not receive pain medication or get a prescription for pain medicine, you can ignore it.     You may have been given a prescription for an opioid (narcotic) pain medicine and/or have received a pain medicine while here in the Emergency Department. These medicines can make you drowsy or impaired. You must not drive, operate dangerous equipment, or engage in any other dangerous activities while taking these medications. If you drive while taking these medications, you could be arrested for DUI, or driving under the influence. Do not drink any alcohol while you are taking these medications.      Opioid pain medications can cause addiction. If you have a history of chemical dependency of any type, you are at a higher risk of becoming addicted to pain medications.  Only take these prescribed medications to treat your pain when all other options have been tried. Take it for as short a time and as few doses as possible. Store your pain pills in a secure place, as they are frequently stolen and provide a dangerous opportunity for children or visitors in your house to start abusing these powerful medications. We will not replace any lost or stolen medicine.  As soon as your pain is better, you should flush all your remaining medication.     Many prescription pain medications contain Tylenol  (acetaminophen), including Vicodin , Tylenol #3 , Norco , Lortab , and Percocet .  You should not take any extra pills of Tylenol  if you are using these prescription medications or you can get very sick.  Do not ever take more than 3000 mg of acetaminophen in any 24 hour period.    All opioids tend to cause constipation. Drink plenty of water and eat foods that have a lot of fiber, such as fruits, vegetables, prune juice, apple juice and high fiber cereal.  Take a laxative if you don t move your bowels at least every other day. Miralax , Milk of Magnesia, Colace , or Senna  can be used to keep you regular.      Remember that you can always come back to the Emergency Department if you are not able to see your regular doctor in the amount of time listed above, if you get any new symptoms, or if there is anything that worries you.      Discharge Instructions  Abdominal Pain    Abdominal pain can be caused by many things. Your evaluation today does not show the exact cause for your pain. Your doctor today has decided that it is unlikely your pain is due to a life threatening problem, or a problem requiring surgery or hospital admission. Sometimes those problems cannot be found right away, so it is very important that you  follow up as directed.  Sometimes only the changes which occur over time allow the cause of your pain to be found.    Return to the Emergency Department for a recheck in 8-12 hours if your pain continues.  If your pain gets worse, changes in location, or feels different, return to the Emergency Department right away.    ADULTS:  Return to the Emergency Department right away if:      You get an oral temperature above 102oF or as directed by your doctor.    You have blood in your stools (bright red or black, tarry stools).    You keep throwing up or can t drink liquids.    You see blood when you throw up.    You can t have a bowel movement or you can t pass gas.    Your stomach gets bloated or bigger.    Your skin or the whites of your eyes look yellow.    You faint.    You have bloody, frequent or painful urination.    You have new symptoms or anything that worries you.    CHILDREN:  Return to the Emergency Department right away if your child has any of the above-listed symptoms or the following:      Pushes your hand away or screams/cries when his/her belly is touched.    You notice your child is very fussy or weak.    Your child is very tired and is too tired to eat or drink.    Your child is dehydrated.  Signs of dehydration can be:  o Your infant has had no wet diapers in 4-5 hours.  o Your older child has not passed urine in 6-8 hours.  o Your infant or child starts to have dry mouth and lips, or no saliva or tears.    PREGNANT WOMEN:  Return to the Emergency Department right away if you have any of the above-listed symptoms or the following:      You have bleeding, leaking fluid or passing tissue from the vagina.    You have worse pain or cramping, or pain in your shoulder or back.    You have vomiting that will not stop.    You have painful or bloody urination.    You have a temperature of 100oF or more.    Your baby is not moving as much as usual.    You faint.    You get a bad headache with or without eye  "problems and abdominal pain.    You have a convulsion or seizure.    You have unusual discharge from your vagina and abdominal pain.    Abdominal pain is pretty common during pregnancy.  Your pain may or may not be related to your pregnancy. You should follow-up closely with your OB doctor so they can evaluate you and your baby.  Until you follow-up with your regular doctor, do the following:       Avoid sex and do not put anything in your vagina.    Drink clear fluids.    Only take medications approved by your doctor.    MORE INFORMATION:    Appendicitis:  A possible cause of abdominal pain in any person who still has their appendix is acute appendicitis. Appendicitis is often hard to diagnose.  Testing does not always rule out early appendicitis or other causes of abdominal pain. Close follow-up with your doctor and re-evaluations may be needed to figure out the reason for your abdominal pain.    Follow-up:  It is very important that you make an appointment with your clinic and go to the appointment.  If you do not follow-up with your primary doctor, it may result in missing an important development which could result in permanent injury or disability and/or lasting pain.  If there is any problem keeping your appointment, call your doctor or return to the Emergency Department.    Medications:  Take your medications as directed by your doctor today.  Before using over-the-counter medications, ask your doctor and make sure to take the medications as directed.  If you have any questions about medications, ask your doctor.    Diet:  Resume your normal diet as much as possible, but do not eat fried, fatty or spicy foods while you have pain.  Do not drink alcohol or have caffeine.  Do not smoke tobacco.    Probiotics: If you have been given an antibiotic, you may want to also take a probiotic pill or eat yogurt with live cultures. Probiotics have \"good bacteria\" to help your intestines stay healthy. Studies have shown " that probiotics help prevent diarrhea and other intestine problems (including C. diff infection) when you take antibiotics. You can buy these without a prescription in the pharmacy section of the store.     If you were given a prescription for medicine here today, be sure to read all of the information (including the package insert) that comes with your prescription.  This will include important information about the medicine, its side effects, and any warnings that you need to know about.  The pharmacist who fills the prescription can provide more information and answer questions you may have about the medicine.  If you have questions or concerns that the pharmacist cannot address, please call or return to the Emergency Department.         Opioid Medication Information    Pain medications are among the most commonly prescribed medicines, so we are including this information for all our patients. If you did not receive pain medication or get a prescription for pain medicine, you can ignore it.     You may have been given a prescription for an opioid (narcotic) pain medicine and/or have received a pain medicine while here in the Emergency Department. These medicines can make you drowsy or impaired. You must not drive, operate dangerous equipment, or engage in any other dangerous activities while taking these medications. If you drive while taking these medications, you could be arrested for DUI, or driving under the influence. Do not drink any alcohol while you are taking these medications.     Opioid pain medications can cause addiction. If you have a history of chemical dependency of any type, you are at a higher risk of becoming addicted to pain medications.  Only take these prescribed medications to treat your pain when all other options have been tried. Take it for as short a time and as few doses as possible. Store your pain pills in a secure place, as they are frequently stolen and provide a dangerous  opportunity for children or visitors in your house to start abusing these powerful medications. We will not replace any lost or stolen medicine.  As soon as your pain is better, you should flush all your remaining medication.     Many prescription pain medications contain Tylenol  (acetaminophen), including Vicodin , Tylenol #3 , Norco , Lortab , and Percocet .  You should not take any extra pills of Tylenol  if you are using these prescription medications or you can get very sick.  Do not ever take more than 3000 mg of acetaminophen in any 24 hour period.    All opioids tend to cause constipation. Drink plenty of water and eat foods that have a lot of fiber, such as fruits, vegetables, prune juice, apple juice and high fiber cereal.  Take a laxative if you don t move your bowels at least every other day. Miralax , Milk of Magnesia, Colace , or Senna  can be used to keep you regular.      Remember that you can always come back to the Emergency Department if you are not able to see your regular doctor in the amount of time listed above, if you get any new symptoms, or if there is anything that worries you.          24 Hour Appointment Hotline       To make an appointment at any [facility], call [phone] ([phone]). If you don't have a family doctor or clinic, we will help you find one. [facility] clinics are conveniently located to serve the needs of you and your family.             Review of your medicines      Our records show that you are taking the medicines listed below. If these are incorrect, please call your family doctor or clinic.        Dose / Directions Last dose taken    CYTOMEL PO   Dose:  5 mcg        Take 5 mcg by mouth 2 times daily   Refills:  0        GLUCOPHAGE XR PO   Dose:  1000 mg        Take 1,000 mg by mouth 2 times daily (with meals)   Refills:  0        LINZESS PO   Dose:  290 mcg        Take 290 mcg by mouth every morning (before breakfast)   Refills:  0        * ESTRACE PO    Dose:  0.5 mg        Take 0.5 mg by mouth daily as needed   Refills:  0        * MINIVELLE 0.1 MG/24HR BIW patch   Dose:  1 patch   Generic drug:  estradiol        Place 1 patch onto the skin twice a week   Refills:  0        oxyCODONE-acetaminophen 5-325 MG per tablet   Commonly known as:  PERCOCET   Dose:  1-2 tablet   Quantity:  20 tablet        Take 1-2 tablets by mouth every 4 hours as needed for pain (moderate to severe)   Refills:  0        PARLODEL PO   Dose:  1.5 tablet        Take 1.5 tablets by mouth At Bedtime   Refills:  0        PRILOSEC PO   Dose:  20 mg        Take 20 mg by mouth every morning   Refills:  0        progesterone 200 MG capsule   Commonly known as:  PROMETRIUM   Dose:  200 mg        Take 200 mg by mouth See Admin Instructions 12 days per month   Refills:  2        SYNTHROID PO   Dose:  112 mcg        Take 112 mcg by mouth daily   Refills:  0        TIZANIDINE HCL PO   Dose:  2 mg        Take 2 mg by mouth daily as needed for muscle spasms   Refills:  0        VAGIFEM 10 MCG Tabs vaginal tablet   Dose:  10 mcg   Generic drug:  estradiol        Place 10 mcg vaginally three times a week   Refills:  0        Vitamin D3 2000 UNITS Tabs   Quantity:  100 tablet        TAKE 1 TABLET BY MOUTH EVERY DAY. START AFTER 50,000 UNIT WEEKY SUPPLEMENT HAS FINISHED   Refills:  3        * Notice:  This list has 2 medication(s) that are the same as other medications prescribed for you. Read the directions carefully, and ask your doctor or other care provider to review them with you.            Procedures and tests performed during your visit     CBC with platelets differential    CT Chest/Abdomen/Pelvis w Contrast    Comprehensive metabolic panel    EKG 12-lead, tracing only    Lipase    Peripheral IV catheter    Troponin I      Orders Needing Specimen Collection     None      Pending Results     Date and Time Order Name Status Description    4/11/2017 1531 CT Chest/Abdomen/Pelvis w Contrast Preliminary      4/11/2017 1519 EKG 12-lead, tracing only Preliminary             Pending Culture Results     No orders found from 4/9/2017 to 4/12/2017.            Test Results From Your Hospital Stay        4/11/2017  3:53 PM      Component Results     Component Value Ref Range & Units Status    WBC 8.4 4.0 - 11.0 10e9/L Final    RBC Count 4.95 3.8 - 5.2 10e12/L Final    Hemoglobin 13.3 11.7 - 15.7 g/dL Final    Hematocrit 41.3 35.0 - 47.0 % Final    MCV 83 78 - 100 fl Final    MCH 26.9 26.5 - 33.0 pg Final    MCHC 32.2 31.5 - 36.5 g/dL Final    RDW 15.0 10.0 - 15.0 % Final    Platelet Count 369 150 - 450 10e9/L Final    Diff Method Automated Method  Final    % Neutrophils 61.8 % Final    % Lymphocytes 28.1 % Final    % Monocytes 6.9 % Final    % Eosinophils 2.5 % Final    % Basophils 0.5 % Final    % Immature Granulocytes 0.2 % Final    Nucleated RBCs 0 0 /100 Final    Absolute Neutrophil 5.2 1.6 - 8.3 10e9/L Final    Absolute Lymphocytes 2.4 0.8 - 5.3 10e9/L Final    Absolute Monocytes 0.6 0.0 - 1.3 10e9/L Final    Absolute Eosinophils 0.2 0.0 - 0.7 10e9/L Final    Absolute Basophils 0.0 0.0 - 0.2 10e9/L Final    Abs Immature Granulocytes 0.0 0 - 0.4 10e9/L Final    Absolute Nucleated RBC 0.0  Final         4/11/2017  4:10 PM      Component Results     Component Value Ref Range & Units Status    Sodium 140 133 - 144 mmol/L Final    Potassium 4.2 3.4 - 5.3 mmol/L Final    Chloride 106 94 - 109 mmol/L Final    Carbon Dioxide 28 20 - 32 mmol/L Final    Anion Gap 6 3 - 14 mmol/L Final    Glucose 116 (H) 70 - 99 mg/dL Final    Urea Nitrogen 10 7 - 30 mg/dL Final    Creatinine 0.64 0.52 - 1.04 mg/dL Final    GFR Estimate >90  Non  GFR Calc   >60 mL/min/1.7m2 Final    GFR Estimate If Black >90   GFR Calc   >60 mL/min/1.7m2 Final    Calcium 8.9 8.5 - 10.1 mg/dL Final    Bilirubin Total 0.2 0.2 - 1.3 mg/dL Final    Albumin 3.4 3.4 - 5.0 g/dL Final    Protein Total 7.2 6.8 - 8.8 g/dL Final    Alkaline  Phosphatase 88 40 - 150 U/L Final    ALT 25 0 - 50 U/L Final    AST 15 0 - 45 U/L Final         4/11/2017  4:06 PM      Component Results     Component Value Ref Range & Units Status    Lipase 118 73 - 393 U/L Final         4/11/2017  4:10 PM      Component Results     Component Value Ref Range & Units Status    Troponin I ES  0.000 - 0.045 ug/L Final    <0.015  The 99th percentile for upper reference range is 0.045 ug/L.  Troponin values in   the range of 0.045 - 0.120 ug/L may be associated with risks of adverse   clinical events.           4/11/2017  4:46 PM      Narrative     CT CHEST/ABDOMEN/PELVIS WITH CONTRAST  4/11/2017 4:27 PM    HISTORY: Chest pain. Shortness of breath. PE. Abdomen pain. Status  post abdomen surgery.    COMPARISON: None.    TECHNIQUE: Volumetric helical acquisition of CT images from the lung  apices through the symphysis pubis after the uneventful administration  of 102 mL Isovue-370 intravenous contrast. Radiation dose for this  scan was reduced using automated exposure control, adjustment of the  mA and/or kV according to patient size, or iterative reconstruction  technique.    FINDINGS:   Chest: There is no pulmonary embolism. The heart is not enlarged.  Thoracic aorta is atherosclerotic without evidence of dissection or  aneurysm. There is no pleural or pericardial effusion.  There is no  pneumothorax.     Abdomen and pelvis: The liver, bilateral kidneys and adrenal glands,  pancreas, and spleen demonstrate no worrisome focal lesion.  There is  trace pelvic free fluid which is nonspecific. Normal appendix. No  hydronephrosis. No free air in the abdomen. Bone windows reveal no  destructive lesions. There are no abdominal or pelvic lymph nodes that  are abnormal by size criteria. There are no dilated loops of small  bowel or colon.        Impression     IMPRESSION: No acute process demonstrated within the chest, abdomen  and pelvis.                 Clinical Quality Measure: Blood  Pressure Screening     Your blood pressure was checked while you were in the emergency department today. The last reading we obtained was  BP: (!) 141/103 . Please read the guidelines below about what these numbers mean and what you should do about them.  If your systolic blood pressure (the top number) is less than 120 and your diastolic blood pressure (the bottom number) is less than 80, then your blood pressure is normal. There is nothing more that you need to do about it.  If your systolic blood pressure (the top number) is 120-139 or your diastolic blood pressure (the bottom number) is 80-89, your blood pressure may be higher than it should be. You should have your blood pressure rechecked within a year by a primary care provider.  If your systolic blood pressure (the top number) is 140 or greater or your diastolic blood pressure (the bottom number) is 90 or greater, you may have high blood pressure. High blood pressure is treatable, but if left untreated over time it can put you at risk for heart attack, stroke, or kidney failure. You should have your blood pressure rechecked by a primary care provider within the next 4 weeks.  If your provider in the emergency department today gave you specific instructions to follow-up with your doctor or provider even sooner than that, you should follow that instruction and not wait for up to 4 weeks for your follow-up visit.        Thank you for choosing [facility]       Thank you for choosing [facility] for your care. Our goal is always to provide you with excellent care. Hearing back from our patients is one way we can continue to improve our services. Please take a few minutes to complete the written survey that you may receive in the mail after you visit with us. Thank you!        [employer] Information     [employer] gives you secure access to your electronic health record. If you see a primary care provider, you can also send messages to your care team and make appointments.  If you have questions, please call your primary care clinic.  If you do not have a primary care provider, please call [phone] and they will assist you.        Care EveryWhere ID     This is your Care EveryWhere ID. This could be used by other organizations to access your [facility] medical records  [id number]        After Visit Summary       This is your record. Keep this with you and show to your community pharmacist(s) and doctor(s) at your next visit.

## 2017-04-11 NOTE — ED PROVIDER NOTES
History     Chief Complaint:  Chest Pain    HPI   Socorro Etienne is a 46 year old female who presents to the emergency department today for evaluation of chest pain. The patient reports that on 2017 she had a salpingo-oophorectomy and was hospitalized for 24 hours subsequently, as she was having some difficulties breathing and pain in her abdomen. She states that since then she has had some pain with laying flat, but her abdominal pain is getting better at this time now. However, she is concerned that she is now experiencing some chest heaviness and increased difficulty with taking in a deep breath and is quite anxious for this reason. She denies any unilateral leg swelling or calf pain, cough, fevers, lightheadedness, or other symptoms at this time.     PE/DVT Risk Factors:   Hx of PE/DVT:   -  Hx of clotting disorder:  -  Hx of cancer:    -  Tobacco use:    -  Hormone therapy:   +  Pregnancy:    -  Prolonged immobilization:  -  Recent surgery:   +  Recent travel:    -  Familial Hx of PE/DVT:  -    Allergies:  No Known Drug Allergies  Latex    Medications:    oxyCODONE-acetaminophen (PERCOCET) 5-325 MG per tablet   MetFORMIN HCl (GLUCOPHAGE XR PO)   Levothyroxine Sodium (SYNTHROID PO)   Linaclotide (LINZESS PO)   Omeprazole (PRILOSEC PO)   Estradiol (ESTRACE PO)   TIZANIDINE HCL PO   VAGIFEM 10 MCG TABS   MINIVELLE 0.1 MG/24HR patch   Cholecalciferol (VITAMIN D3) 2000 UNITS TABS   Liothyronine Sodium (CYTOMEL PO)   progesterone (PROMETRIUM) 200 MG capsule   Bromocriptine Mesylate (PARLODEL PO)        Past Medical History:    Anemia  ASCUS on Pap smear  GERD  Hypertension  Hypothyroid  PCOS  Prolactin increased  Fibrocystic breast disease    Past Surgical History:      Breast surgery  Cholecystectomy  Dilation and curettage  Colonoscopy  Laparoscopic salpingo-oophorectomy, bilateral (2017)  Orthopedic surgery  Soft tissue surgery    Family History:    Mother - Heart Disease, CAD, Depression,  Thyroid Disease  Father - Diabetes, Cancer, CVA, Cardiovascular  Sister - Hypertension, Thyroid Disease, Bipolar  Brother - Heart Disease     Social History:  The patient was unaccompanied to the ED.  Smoking Status: Negative  Alcohol Use: Negative  Marital Status:   [4]     Review of Systems   Constitutional: Negative for fever.   Respiratory: Positive for shortness of breath. Negative for cough.    Cardiovascular: Positive for chest pain. Negative for leg swelling.   Gastrointestinal: Positive for abdominal pain.   Neurological: Negative for light-headedness.   All other systems reviewed and are negative.    Physical Exam   Vitals:  Patient Vitals for the past 24 hrs:   BP Temp Temp src Pulse Heart Rate Resp SpO2   04/11/17 1645 (!) 141/103 - - - 86 24 -   04/11/17 1545 134/78 - - - 82 18 -   04/11/17 1515 (!) 176/91 98.4  F (36.9  C) Oral 100 - 24 98 %     Physical Exam  Nursing note and vitals reviewed.  Constitutional:  Oriented to person, place, and time. Cooperative. Anxious appearing.  HENT:   Nose:    Nose normal.   Mouth/Throat:   Mucous membranes are normal.   Eyes:    Conjunctivae normal and EOM are normal.      Pupils are equal, round, and reactive to light.   Neck:    Trachea normal.   Cardiovascular:  Normal rate, regular rhythm, normal heart sounds and normal pulses. No murmur heard.  Pulmonary/Chest:  Effort normal and breath sounds normal.   Abdominal:   Soft. Normal appearance and bowel sounds are normal.      Diffuse tenderness to palpation.      There is no rebound and no CVA tenderness.   Musculoskeletal:  Extremities atraumatic x 4.   Lymphadenopathy:  No cervical adenopathy.   Neurological:   Alert and oriented to person, place, and time. Normal strength.      No cranial nerve deficit or sensory deficit. GCS eye subscore is 4. GCS verbal subscore is 5. GCS motor subscore is 6.   Skin:    Skin is intact. No rash noted.   Psychiatric:   Anxious and tearful at times.     Emergency  Department Course     ECG:  ECG taken at 1530, ECG read at 1535  normal sinus rhythm  Normal ECG  Rate 82 bpm. IA interval 118. QRS duration 84. QT/QTc 370/432. P-R-T axes 57 51 46.    Imaging:  Radiology findings were communicated with the patient who voiced understanding of the findings.    Chest/Abdomen/Pelvis CT with contrast:  No acute process demonstrated within the chest, abdomen  and pelvis.   Reading per radiology    Laboratory:  Laboratory findings were communicated with the patient who voiced understanding of the findings.    CBC: AWNL. (WBC 8.4, HGB 13.3, )   CMP: Glucose: 116 (H)(Creatinine 0.64)  Lipase: 118  Troponin (Collected 1538): <0.015     Emergency Department Course:  Nursing notes and vitals reviewed.  I performed an exam of the patient as documented above.   IV was inserted and blood was drawn for laboratory testing, results above.  The patient was sent for a chest/abdomen/pelvis CT with contrast while in the emergency department, results above.   At 1657 the patient was rechecked and was updated on the results of her laboratory and imaging studies.   I discussed the treatment plan with the patient. She expressed understanding of this plan and consented to discharge. She will be discharged home with instructions for care and follow up. In addition, the patient will return to the emergency department if their symptoms persist, worsen, if new symptoms arise or if there is any concern.  All questions were answered.  I personally reviewed the laboratory and imaging results with the patient and answered all related questions prior to discharge.    Impression & Plan      Medical Decision Making:  Socorro Etienne is a 46 year old female who presents to the emergency department today to essentially rule out a pulmonary embolism. She had normal vital signs here, which was reassuring. She also only had minimal abdominal discomfort here on exam. The only way I would be able to rule out a  pulmonary embolism was to obtain a CT scan of her chest. I also considered an abdominal etiology and included a CT of her abdomen to rule out any post-operative pathology. Her work up here is unremarkable including the CT scan. Therefore at this point I feel that she is safe for discharge home with outpatient management. I suspect that some of her symptoms are from post-operative pain and possibly even diaphragmatic irritation from the recent surgery. Regardless, I think she is safe for discharge with outpatient management. I instructed her to follow up with her own doctor if her symptoms persist and certainly return if her symptoms worsen.     Diagnosis:    ICD-10-CM    1. Chest pain, unspecified type R07.9    2. Postoperative pain G89.18        Scribe Disclosure:  I, Chuck Arevalo, am serving as a scribe at 3:28 PM on 4/11/2017 to document services personally performed by Mateo Blount MD, based on my observations and the provider's statements to me.    4/11/2017    EMERGENCY DEPARTMENT       Mateo Blount MD  04/11/17 9578

## 2017-04-11 NOTE — DISCHARGE INSTRUCTIONS
Discharge Instructions  Chest Pain    You have been seen today for chest pain or discomfort.  At this time, your doctor has found no signs that your chest pain is due to a serious or life-threatening condition, (or you have declined more testing and/or admission to the hospital). However, sometimes there is a serious problem that does not show up right away. Your evaluation today may not be complete and you may need further testing and evaluation.     You need to follow-up with your regular doctor within 3 days.    Return to the Emergency Department if:    Your chest pain changes, gets worse, starts to happen more often, or comes with less activity.    You are short of breath.    You get very weak or tired.    You pass out or faint.    You have any new symptoms, like fever, cough, numb legs, or you cough up blood.    You have anything else that worries you.    Until you follow-up with your regular doctor please do the following:    Take one aspirin daily unless you have an allergy or are told not to by your doctor.    If a stress test appointment has been made, go to the appointment.    If you have questions, contact your regular doctor.    If your doctor today has told you to follow-up with your regular doctor, it is very important that you make an appointment with your clinic and go to the appointment.  If you do not follow-up with your primary doctor, it may result in missing an important development which could result in permanent injury or disability and/or lasting pain.  If there is any problem keeping your appointment, call your doctor or return to the Emergency Department.    If you were given a prescription for medicine here today, be sure to read all of the information (including the package insert) that comes with your prescription.  This will include important information about the medicine, its side effects, and any warnings that you need to know about.  The pharmacist who fills the prescription can  provide more information and answer questions you may have about the medicine.  If you have questions or concerns that the pharmacist cannot address, please call or return to the Emergency Department.     Opioid Medication Information    Pain medications are among the most commonly prescribed medicines, so we are including this information for all our patients. If you did not receive pain medication or get a prescription for pain medicine, you can ignore it.     You may have been given a prescription for an opioid (narcotic) pain medicine and/or have received a pain medicine while here in the Emergency Department. These medicines can make you drowsy or impaired. You must not drive, operate dangerous equipment, or engage in any other dangerous activities while taking these medications. If you drive while taking these medications, you could be arrested for DUI, or driving under the influence. Do not drink any alcohol while you are taking these medications.     Opioid pain medications can cause addiction. If you have a history of chemical dependency of any type, you are at a higher risk of becoming addicted to pain medications.  Only take these prescribed medications to treat your pain when all other options have been tried. Take it for as short a time and as few doses as possible. Store your pain pills in a secure place, as they are frequently stolen and provide a dangerous opportunity for children or visitors in your house to start abusing these powerful medications. We will not replace any lost or stolen medicine.  As soon as your pain is better, you should flush all your remaining medication.     Many prescription pain medications contain Tylenol  (acetaminophen), including Vicodin , Tylenol #3 , Norco , Lortab , and Percocet .  You should not take any extra pills of Tylenol  if you are using these prescription medications or you can get very sick.  Do not ever take more than 3000 mg of acetaminophen in any 24 hour  period.    All opioids tend to cause constipation. Drink plenty of water and eat foods that have a lot of fiber, such as fruits, vegetables, prune juice, apple juice and high fiber cereal.  Take a laxative if you don t move your bowels at least every other day. Miralax , Milk of Magnesia, Colace , or Senna  can be used to keep you regular.      Remember that you can always come back to the Emergency Department if you are not able to see your regular doctor in the amount of time listed above, if you get any new symptoms, or if there is anything that worries you.      Discharge Instructions  Abdominal Pain    Abdominal pain can be caused by many things. Your evaluation today does not show the exact cause for your pain. Your doctor today has decided that it is unlikely your pain is due to a life threatening problem, or a problem requiring surgery or hospital admission. Sometimes those problems cannot be found right away, so it is very important that you follow up as directed.  Sometimes only the changes which occur over time allow the cause of your pain to be found.    Return to the Emergency Department for a recheck in 8-12 hours if your pain continues.  If your pain gets worse, changes in location, or feels different, return to the Emergency Department right away.    ADULTS:  Return to the Emergency Department right away if:      You get an oral temperature above 102oF or as directed by your doctor.    You have blood in your stools (bright red or black, tarry stools).    You keep throwing up or can t drink liquids.    You see blood when you throw up.    You can t have a bowel movement or you can t pass gas.    Your stomach gets bloated or bigger.    Your skin or the whites of your eyes look yellow.    You faint.    You have bloody, frequent or painful urination.    You have new symptoms or anything that worries you.    CHILDREN:  Return to the Emergency Department right away if your child has any of the above-listed  symptoms or the following:      Pushes your hand away or screams/cries when his/her belly is touched.    You notice your child is very fussy or weak.    Your child is very tired and is too tired to eat or drink.    Your child is dehydrated.  Signs of dehydration can be:  o Your infant has had no wet diapers in 4-5 hours.  o Your older child has not passed urine in 6-8 hours.  o Your infant or child starts to have dry mouth and lips, or no saliva or tears.    PREGNANT WOMEN:  Return to the Emergency Department right away if you have any of the above-listed symptoms or the following:      You have bleeding, leaking fluid or passing tissue from the vagina.    You have worse pain or cramping, or pain in your shoulder or back.    You have vomiting that will not stop.    You have painful or bloody urination.    You have a temperature of 100oF or more.    Your baby is not moving as much as usual.    You faint.    You get a bad headache with or without eye problems and abdominal pain.    You have a convulsion or seizure.    You have unusual discharge from your vagina and abdominal pain.    Abdominal pain is pretty common during pregnancy.  Your pain may or may not be related to your pregnancy. You should follow-up closely with your OB doctor so they can evaluate you and your baby.  Until you follow-up with your regular doctor, do the following:       Avoid sex and do not put anything in your vagina.    Drink clear fluids.    Only take medications approved by your doctor.    MORE INFORMATION:    Appendicitis:  A possible cause of abdominal pain in any person who still has their appendix is acute appendicitis. Appendicitis is often hard to diagnose.  Testing does not always rule out early appendicitis or other causes of abdominal pain. Close follow-up with your doctor and re-evaluations may be needed to figure out the reason for your abdominal pain.    Follow-up:  It is very important that you make an appointment with your  "clinic and go to the appointment.  If you do not follow-up with your primary doctor, it may result in missing an important development which could result in permanent injury or disability and/or lasting pain.  If there is any problem keeping your appointment, call your doctor or return to the Emergency Department.    Medications:  Take your medications as directed by your doctor today.  Before using over-the-counter medications, ask your doctor and make sure to take the medications as directed.  If you have any questions about medications, ask your doctor.    Diet:  Resume your normal diet as much as possible, but do not eat fried, fatty or spicy foods while you have pain.  Do not drink alcohol or have caffeine.  Do not smoke tobacco.    Probiotics: If you have been given an antibiotic, you may want to also take a probiotic pill or eat yogurt with live cultures. Probiotics have \"good bacteria\" to help your intestines stay healthy. Studies have shown that probiotics help prevent diarrhea and other intestine problems (including C. diff infection) when you take antibiotics. You can buy these without a prescription in the pharmacy section of the store.     If you were given a prescription for medicine here today, be sure to read all of the information (including the package insert) that comes with your prescription.  This will include important information about the medicine, its side effects, and any warnings that you need to know about.  The pharmacist who fills the prescription can provide more information and answer questions you may have about the medicine.  If you have questions or concerns that the pharmacist cannot address, please call or return to the Emergency Department.         Opioid Medication Information    Pain medications are among the most commonly prescribed medicines, so we are including this information for all our patients. If you did not receive pain medication or get a prescription for pain " medicine, you can ignore it.     You may have been given a prescription for an opioid (narcotic) pain medicine and/or have received a pain medicine while here in the Emergency Department. These medicines can make you drowsy or impaired. You must not drive, operate dangerous equipment, or engage in any other dangerous activities while taking these medications. If you drive while taking these medications, you could be arrested for DUI, or driving under the influence. Do not drink any alcohol while you are taking these medications.     Opioid pain medications can cause addiction. If you have a history of chemical dependency of any type, you are at a higher risk of becoming addicted to pain medications.  Only take these prescribed medications to treat your pain when all other options have been tried. Take it for as short a time and as few doses as possible. Store your pain pills in a secure place, as they are frequently stolen and provide a dangerous opportunity for children or visitors in your house to start abusing these powerful medications. We will not replace any lost or stolen medicine.  As soon as your pain is better, you should flush all your remaining medication.     Many prescription pain medications contain Tylenol  (acetaminophen), including Vicodin , Tylenol #3 , Norco , Lortab , and Percocet .  You should not take any extra pills of Tylenol  if you are using these prescription medications or you can get very sick.  Do not ever take more than 3000 mg of acetaminophen in any 24 hour period.    All opioids tend to cause constipation. Drink plenty of water and eat foods that have a lot of fiber, such as fruits, vegetables, prune juice, apple juice and high fiber cereal.  Take a laxative if you don t move your bowels at least every other day. Miralax , Milk of Magnesia, Colace , or Senna  can be used to keep you regular.      Remember that you can always come back to the Emergency Department if you are not able  to see your regular doctor in the amount of time listed above, if you get any new symptoms, or if there is anything that worries you.

## 2017-04-11 NOTE — ED AVS SNAPSHOT
Emergency Department    [address] [place] [id number]    Phone:  [phone]    Fax:  [phone]                                       Socorro Etienne   MRN: [id number]    Department:   Emergency Department   Date of Visit:  4/11/2017           After Visit Summary Signature Page     I have received my discharge instructions, and my questions have been answered. I have discussed any challenges I see with this plan with the nurse or doctor.    ..........................................................................................................................................  Patient/Patient Representative Signature      ..........................................................................................................................................  Patient Representative Print Name and Relationship to Patient    ..................................................               ................................................  Date                                            Time    ..........................................................................................................................................  Reviewed by Signature/Title    ...................................................              ..............................................  Date                                                            Time

## 2017-04-27 ENCOUNTER — TRANSFERRED RECORDS (OUTPATIENT)
Dept: HEALTH INFORMATION MANAGEMENT | Facility: CLINIC | Age: 47
End: 2017-04-27

## 2017-06-22 ENCOUNTER — RECORDS - HEALTHEAST (OUTPATIENT)
Dept: ADMINISTRATIVE | Facility: OTHER | Age: 47
End: 2017-06-22

## 2017-06-23 ENCOUNTER — RECORDS - HEALTHEAST (OUTPATIENT)
Dept: ADMINISTRATIVE | Facility: OTHER | Age: 47
End: 2017-06-23

## 2017-06-29 ENCOUNTER — INFUSION - HEALTHEAST (OUTPATIENT)
Dept: INFUSION THERAPY | Facility: HOSPITAL | Age: 47
End: 2017-06-29

## 2017-06-29 DIAGNOSIS — D50.9 IRON DEFICIENCY ANEMIA, UNSPECIFIED: ICD-10-CM

## 2017-07-06 ENCOUNTER — INFUSION - HEALTHEAST (OUTPATIENT)
Dept: INFUSION THERAPY | Facility: HOSPITAL | Age: 47
End: 2017-07-06

## 2017-07-06 DIAGNOSIS — D50.9 IRON DEFICIENCY ANEMIA, UNSPECIFIED: ICD-10-CM

## 2017-09-18 ENCOUNTER — TRANSFERRED RECORDS (OUTPATIENT)
Dept: HEALTH INFORMATION MANAGEMENT | Facility: CLINIC | Age: 47
End: 2017-09-18

## 2017-09-18 LAB
ALT SERPL-CCNC: 24 U/L (ref 6–29)
CREAT SERPL-MCNC: 0.69 MG/DL (ref 0.5–1.1)
GFR SERPL CREATININE-BSD FRML MDRD: 104 ML/MIN/1.73M2
GLUCOSE SERPL-MCNC: 95 MG/DL (ref 65–99)
HBA1C MFR BLD: 5.4 % (ref 4–6)
POTASSIUM SERPL-SCNC: 4.1 MMOL/L (ref 3.5–5.3)
TSH SERPL-ACNC: 0.83 UIU/ML (ref 0.3–5)

## 2017-11-09 ENCOUNTER — TRANSFERRED RECORDS (OUTPATIENT)
Dept: HEALTH INFORMATION MANAGEMENT | Facility: CLINIC | Age: 47
End: 2017-11-09

## 2018-02-16 ENCOUNTER — TRANSFERRED RECORDS (OUTPATIENT)
Dept: HEALTH INFORMATION MANAGEMENT | Facility: CLINIC | Age: 48
End: 2018-02-16

## 2018-03-06 ENCOUNTER — TRANSFERRED RECORDS (OUTPATIENT)
Dept: HEALTH INFORMATION MANAGEMENT | Facility: CLINIC | Age: 48
End: 2018-03-06

## 2018-03-06 LAB
HBA1C MFR BLD: 5.5 % (ref 4.8–5.6)
HPV ABSTRACT: ABNORMAL
PAP-ABSTRACT: NORMAL
TSH SERPL-ACNC: 2.39 UIU/ML (ref 0.45–4.5)

## 2018-03-07 ENCOUNTER — TRANSFERRED RECORDS (OUTPATIENT)
Dept: HEALTH INFORMATION MANAGEMENT | Facility: CLINIC | Age: 48
End: 2018-03-07

## 2018-03-09 ENCOUNTER — RADIANT APPOINTMENT (OUTPATIENT)
Dept: MAMMOGRAPHY | Facility: CLINIC | Age: 48
End: 2018-03-09
Attending: PHYSICIAN ASSISTANT
Payer: COMMERCIAL

## 2018-03-09 ENCOUNTER — RADIANT APPOINTMENT (OUTPATIENT)
Dept: BONE DENSITY | Facility: CLINIC | Age: 48
End: 2018-03-09
Attending: PHYSICIAN ASSISTANT
Payer: COMMERCIAL

## 2018-03-09 DIAGNOSIS — Z13.820 OSTEOPOROSIS SCREENING: ICD-10-CM

## 2018-03-09 DIAGNOSIS — Z12.31 VISIT FOR SCREENING MAMMOGRAM: ICD-10-CM

## 2018-03-09 PROCEDURE — 77067 SCR MAMMO BI INCL CAD: CPT

## 2018-03-09 PROCEDURE — 77063 BREAST TOMOSYNTHESIS BI: CPT

## 2018-03-09 PROCEDURE — 77080 DXA BONE DENSITY AXIAL: CPT | Performed by: RADIOLOGY

## 2018-03-12 ENCOUNTER — TRANSFERRED RECORDS (OUTPATIENT)
Dept: HEALTH INFORMATION MANAGEMENT | Facility: CLINIC | Age: 48
End: 2018-03-12

## 2018-03-12 LAB
ALT SERPL-CCNC: 27 IU/L (ref 12–68)
AST SERPL-CCNC: 18 IU/L (ref 15–37)
CREAT SERPL-MCNC: 0.73 MG/DL (ref 0.55–1.02)
GFR SERPL CREATININE-BSD FRML MDRD: >60 ML/MIN/1.73M2
GLUCOSE SERPL-MCNC: 78 MG/DL (ref 70–110)
POTASSIUM SERPL-SCNC: 3.9 MMOL/L (ref 3.5–5.1)

## 2018-03-16 ENCOUNTER — MEDICAL CORRESPONDENCE (OUTPATIENT)
Dept: HEALTH INFORMATION MANAGEMENT | Facility: CLINIC | Age: 48
End: 2018-03-16

## 2018-03-19 ENCOUNTER — TRANSFERRED RECORDS (OUTPATIENT)
Dept: HEALTH INFORMATION MANAGEMENT | Facility: CLINIC | Age: 48
End: 2018-03-19

## 2018-03-26 NOTE — PROGRESS NOTES
SUBJECTIVE:   Socorro Etienne is a 47 year old female who presents to clinic today for the following health issues:    HPI     MTHFR  Patient is here to discuss her diagnosis of heterozygous MTHFR. She would like to get education on it and also discuss treatment plans. She originally got the genetic testing done on herself after her son was tested homozygous for the gene. He was doing gene site testing for mental health medications. Her son has a different cluster of symptoms than she has experienced.    Her homocysteine testing was done and she had levels on the lowest end of normal. She takes Vagifem, progesterone, and estradiol patches. She has been seeing an endocrinologist for checking her thyroid and iron levels. She has gained 30 pounds in the last year, she had to have 2 iron infusion last year, and the iron was binding her medication.     Pituitary Microadenoma  She reports that her prolactin levels are elevated and she is concerned that her pituitary microadenoma has returned. She would like to know for sure so that she can be treated for it if needed. She is experiencing symptoms of headache with neurological features, vertigo, blurry vision, hyperprolactinemia.       ED/UC Followup:    Facility:  [facility] ED  Date of visit: 3/12/18  Reason for visit: Kidney Pain  Current Status: Still having pain     She was seen in the ED for what she thinks is kidney pain. Her kidneys were tested and had normal labs. She has still been having this pain. She notes that she recently hasn't been drinking as much water as she used to, and has been drinking more diet sodas.       Problem list and histories reviewed & adjusted, as indicated.  Additional history: as documented    Patient Active Problem List   Diagnosis     Infertility     Oligomenorrhea     Hyperprolactinemia (H)     Obesity     Hypothyroidism     PCOS (polycystic ovarian syndrome)     Pituitary microadenoma (H)     Female hirsutism      Hyperlipidemia LDL goal <130     History of depression     Abdominal pain, right lower quadrant     Family history of cervical cancer     Family history of colon cancer     Iron deficiency anemia due to chronic blood loss     Vitamin D deficiency     Ovarian cyst     Postoperative surgical complication involving both eyes     Past Surgical History:   Procedure Laterality Date     ABDOMEN SURGERY           BACK SURGERY  No surgeries    cervical/lumbar history of treatment     BREAST SURGERY       C  DELIVERY ONLY       DILATION AND CURETTAGE SUCTION  10/19/2012    Procedure: DILATION AND CURETTAGE SUCTION;  SUCTION D&C;  Surgeon: Muriel Purcell MD;  Location: [facility]     GENITOURINARY SURGERY       GI SURGERY      Gallbladder     HC BIOPSY OF BREAST, OPEN INCISIONAL  1999     HC COLONOSCOPY THRU STOMA, DIAGNOSTIC      normal     HC REMOVAL GALLBLADDER  10/2003     HC TOOTH EXTRACTION W/FORCEP       LAPAROSCOPIC SALPINGECTOMY Left 2017    Procedure: LAPAROSCOPIC SALPINGECTOMY;  Surgeon: Muriel Purcell MD;  Location: [facility]     LAPAROSCOPIC SALPINGO-OOPHORECTOMY Right 2017    Procedure: LAPAROSCOPIC SALPINGO-OOPHORECTOMY;  Surgeon: Muriel Purcell MD;  Location: [facility]     LAPAROSCOPY DIAGNOSTIC (GYN)  10/25/2013    Procedure: LAPAROSCOPY DIAGNOSTIC (GYN);  DIAGNOSTIC LAPAROSCOPY;  Surgeon: Muriel Purcell MD;  Location: [facility]     ORTHOPEDIC SURGERY  no surgeries    BRUNA wrists, RT shoulder, BRUNA ankles/feet     SOFT TISSUE SURGERY         Social History   Substance Use Topics     Smoking status: Never Smoker     Smokeless tobacco: Never Used     Alcohol use No     Family History   Problem Relation Age of Onset     HEART DISEASE Mother      MI @ age 53, stented     Coronary Artery Disease Mother      Hypertension Mother      Depression Mother      Anxiety Disorder Mother      MENTAL ILLNESS Mother      Thyroid Disease Mother      Obesity  "Mother      Cardiovascular Father      Bypass in his late 50's     DIABETES Father      Cancer - colorectal Father      in his 50's, small bowel resection     Coronary Artery Disease Father      CEREBROVASCULAR DISEASE Father      Colon Cancer Father      Hypertension Sister      Cervical Cancer Sister      Thyroid Disease Sister      Psychotic Disorder Sister      bipolar     Psychotic Disorder Brother      poss schizophrenia     HEART DISEASE Brother      murmur     Coronary Artery Disease Maternal Grandfather      Coronary Artery Disease Paternal Grandmother      OSTEOPOROSIS Paternal Grandmother      Hypertension Maternal Half-Sister      Anxiety Disorder Maternal Half-Sister      Substance Abuse Maternal Half-Sister      Breast Cancer Other      Substance Abuse Other      Other Cancer Maternal Half-Sister      Anxiety Disorder Paternal Half-Brother      MENTAL ILLNESS Maternal Grandmother      Alzheimer Disease Maternal Grandmother      Asthma Son      Obesity Other      Multiple Sclerosis Other      Asthma Niece            ROS:  Constitutional, HEENT, cardiovascular, pulmonary, GI, , musculoskeletal, neuro, skin, endocrine and psych systems are negative, except as in HPI or otherwise noted.     This document serves as a record of the services and decisions personally performed and made by Brittanie Elkins MD. It was created on her behalf by Jennifer Santillan, a trained medical scribe. The creation of this document is based the provider's statements to the medical scribe.  Jennifer Santillan, March 29, 2018 4:42 PM       OBJECTIVE:                                                    /76  Pulse 99  Temp 97.9  F (36.6  C) (Temporal)  Ht 1.575 m (5' 2\")  Wt 98 kg (216 lb)  SpO2 97%  Breastfeeding? No  BMI 39.51 kg/m2  Body mass index is 39.51 kg/(m^2).   GENERAL: healthy, alert, well nourished, well hydrated, no distress  SKIN: no suspicious lesions, no rashes to visible skin  BACK: No CVA or paralumbar " tenderness  Ortho- Low Back: Normal strength and ROM, good movement and rotation.   ABDOMEN: soft, nontender, without hepatosplenomegaly or masses, bowel sounds normal  PSYCH: Alert and oriented times 3; speech- coherent , normal rate and volume; able to articulate logical thoughts, able to abstract reason, no tangential thoughts, no hallucinations or delusions, affect- normal    Diagnostic test results:  No results found for this or any previous visit (from the past 24 hour(s)).     ASSESSMENT/PLAN:                                                        ICD-10-CM    1. Pituitary microadenoma with hyperprolactinemia (H) D35.2 MR Brain w/o & w Contrast    E22.9    2. Screening for malignant neoplasm of cervix Z12.4    3. MTHFR gene mutation (H) E72.12    4. Hypothyroidism, unspecified type E03.9      MTHFR: Discussed what being a heterozygous MTHFR carrier means for her, and what her risks are. Her main risk is for clotting, but she is at low risk for clotting, and has never had a blood clot. Discussed increased risk of clots from continued use of estrogen, personal decision about risks and benefits for her using hormone replacements to resolved her menopausal symptoms. Offered nutritional counseling in the future if she is interested, could help her identify reasons for her weight gain.      Pituitary Microadenoma: Will order her a maria elena MRI to check on her pituitary and see if the microadenoma has returned or not, and where to go with treatment from there. She is currently experiencing symptoms of headache with neurological features, vertigo, blurry vision, hyperprolactinemia.     Kidney Pain: Advised that this pain may not be in the kidneys directly, is more consistent with low back muscle cramping. Advised to get back to drinking water more frequently, and eliminate diet sodas from her diet. Dehydration can often cause both kidney pain and muscle cramping.       Length of visit was 40 minutes with more than 50  percent of that time used for discussing medical concerns and education    Patient instructions discussed with patient    The information in this document, created by the medical scribe for me, accurately reflects the services I personally performed and the decisions made by me. I have reviewed and approved this document for accuracy.   MD Brittanie Dominguez MD, MD  [facility]

## 2018-03-29 ENCOUNTER — OFFICE VISIT (OUTPATIENT)
Dept: FAMILY MEDICINE | Facility: OTHER | Age: 48
End: 2018-03-29
Payer: COMMERCIAL

## 2018-03-29 VITALS
HEIGHT: 62 IN | DIASTOLIC BLOOD PRESSURE: 76 MMHG | WEIGHT: 216 LBS | HEART RATE: 99 BPM | BODY MASS INDEX: 39.75 KG/M2 | TEMPERATURE: 97.9 F | OXYGEN SATURATION: 97 % | SYSTOLIC BLOOD PRESSURE: 138 MMHG

## 2018-03-29 DIAGNOSIS — D35.2 PITUITARY MICROADENOMA WITH HYPERPROLACTINEMIA (H): Primary | ICD-10-CM

## 2018-03-29 DIAGNOSIS — E03.9 HYPOTHYROIDISM, UNSPECIFIED TYPE: ICD-10-CM

## 2018-03-29 DIAGNOSIS — E22.9 PITUITARY MICROADENOMA WITH HYPERPROLACTINEMIA (H): Primary | ICD-10-CM

## 2018-03-29 DIAGNOSIS — Z15.89 MTHFR GENE MUTATION: ICD-10-CM

## 2018-03-29 PROCEDURE — 99215 OFFICE O/P EST HI 40 MIN: CPT | Performed by: FAMILY MEDICINE

## 2018-03-29 NOTE — MR AVS SNAPSHOT
After Visit Summary   3/29/2018    Socorro Etienne    MRN: [id number]           Patient Information     Date Of Birth          1970        Visit Information        Provider Department      3/29/2018 4:00 PM Brittanie Elkins MD [facility]        Today's Diagnoses     Pituitary microadenoma with hyperprolactinemia (H)    -  1    MTHFR gene mutation (H)        Hypothyroidism, unspecified type          Care Instructions    Order placed for MRI, will need to call to schedule at a later time.           Follow-ups after your visit        Future tests that were ordered for you today     Open Future Orders        Priority Expected Expires Ordered    MR Brain w/o & w Contrast Routine  3/29/2019 3/29/2018            Who to contact     If you have questions or need follow up information about today's clinic visit or your schedule please contact [facility] directly at [phone].  Normal or non-critical lab and imaging results will be communicated to you by ScriptRxhart, letter or phone within 4 business days after the clinic has received the results. If you do not hear from us within 7 days, please contact the clinic through ScriptRxhart or phone. If you have a critical or abnormal lab result, we will notify you by phone as soon as possible.  Submit refill requests through PENRITH or call your pharmacy and they will forward the refill request to us. Please allow 3 business days for your refill to be completed.          Additional Information About Your Visit        MyChart Information     PENRITH gives you secure access to your electronic health record. If you see a primary care provider, you can also send messages to your care team and make appointments. If you have questions, please call your primary care clinic.  If you do not have a primary care provider, please call [phone] and they will assist you.        Care EveryWhere ID     This is your Care EveryWhere ID. This could  "be used by other organizations to access your [place] medical records  [id number]        Your Vitals Were     Pulse Temperature Height Pulse Oximetry Breastfeeding? BMI (Body Mass Index)    99 97.9  F (36.6  C) (Temporal) 5' 2\" (1.575 m) 97% No 39.51 kg/m2       Blood Pressure from Last 3 Encounters:   03/29/18 138/76   04/11/17 (!) 141/103   04/07/17 128/63    Weight from Last 3 Encounters:   03/29/18 216 lb (98 kg)   04/06/17 203 lb 4.2 oz (92.2 kg)   06/08/16 182 lb (82.6 kg)               Primary Care Provider Office Phone # Fax #    Muriel Lilian Purcell [phone]       [facility] [address]  [place] [address]        Equal Access to Services     [facility]: Hadii aad antionette hadasho Socarlos, waaxda luqadaha, qaybta kaalmada adeegyada, jyotsna palmer hayellen malone . So [facility] [phone].    ATENCIÓN: Si habla español, tiene a adame disposición servicios gratuitos de asistencia lingüística. Art al [phone].    We comply with applicable federal civil rights laws and [place] laws. We do not discriminate on the basis of race, color, national origin, age, disability, sex, sexual orientation, or gender identity.            Thank you!     Thank you for choosing [facility] [place] [facility]  for your care. Our goal is always to provide you with excellent care. Hearing back from our patients is one way we can continue to improve our services. Please take a few minutes to complete the written survey that you may receive in the mail after your visit with us. Thank you!             Your Updated Medication List - Protect others around you: Learn how to safely use, store and throw away your medicines at www.disposemymeds.org.          This list is accurate as of 3/29/18  6:03 PM.  Always use your most recent med list.                   Brand Name Dispense Instructions for use Diagnosis    CYTOMEL PO      Take 5 mcg by mouth 2 times daily        GLUCOPHAGE XR " PO      Take 1,000 mg by mouth 2 times daily (with meals)        LINZESS PO      Take 290 mcg by mouth every morning (before breakfast)        * ESTRACE PO      Take 0.5 mg by mouth daily as needed        * MINIVELLE 0.1 MG/24HR BIW patch   Generic drug:  estradiol      Place 1 patch onto the skin twice a week        oxyCODONE-acetaminophen 5-325 MG per tablet    PERCOCET    20 tablet    Take 1-2 tablets by mouth every 4 hours as needed for pain (moderate to severe)    Cyst of right ovary       PARLODEL PO      Take 1.5 tablets by mouth At Bedtime        PRILOSEC PO      Take 20 mg by mouth every morning        progesterone 200 MG capsule    PROMETRIUM     Take 200 mg by mouth See Admin Instructions 12 days per month        SYNTHROID PO      Take 112 mcg by mouth daily        TIZANIDINE HCL PO      Take 2 mg by mouth daily as needed for muscle spasms        VAGIFEM 10 MCG Tabs vaginal tablet   Generic drug:  estradiol      Place 10 mcg vaginally three times a week        Vitamin D3 2000 UNITS Tabs     100 tablet    TAKE 1 TABLET BY MOUTH EVERY DAY. START AFTER 50,000 UNIT WEEKY SUPPLEMENT HAS FINISHED    Vitamin D deficiency, unspecified       * Notice:  This list has 2 medication(s) that are the same as other medications prescribed for you. Read the directions carefully, and ask your doctor or other care provider to review them with you.

## 2018-04-02 ENCOUNTER — RADIANT APPOINTMENT (OUTPATIENT)
Dept: MRI IMAGING | Facility: CLINIC | Age: 48
End: 2018-04-02
Attending: FAMILY MEDICINE
Payer: COMMERCIAL

## 2018-04-02 ENCOUNTER — MYC MEDICAL ADVICE (OUTPATIENT)
Dept: FAMILY MEDICINE | Facility: OTHER | Age: 48
End: 2018-04-02

## 2018-04-02 DIAGNOSIS — D35.2 PITUITARY MICROADENOMA WITH HYPERPROLACTINEMIA (H): ICD-10-CM

## 2018-04-02 DIAGNOSIS — E22.9 PITUITARY MICROADENOMA WITH HYPERPROLACTINEMIA (H): ICD-10-CM

## 2018-04-02 PROCEDURE — A9585 GADOBUTROL INJECTION: HCPCS | Performed by: RADIOLOGY

## 2018-04-02 PROCEDURE — 70553 MRI BRAIN STEM W/O & W/DYE: CPT | Performed by: RADIOLOGY

## 2018-04-02 RX ORDER — GADOBUTROL 604.72 MG/ML
10 INJECTION INTRAVENOUS ONCE
Status: COMPLETED | OUTPATIENT
Start: 2018-04-02 | End: 2018-04-02

## 2018-04-02 RX ADMIN — GADOBUTROL 10 ML: 604.72 INJECTION INTRAVENOUS at 14:16

## 2018-04-02 NOTE — PROGRESS NOTES
Socorro, your results were unchanged for the microadenoma.  Please let me know if you have any questions.    Brittanie Elkins MD

## 2018-04-15 ENCOUNTER — HEALTH MAINTENANCE LETTER (OUTPATIENT)
Age: 48
End: 2018-04-15

## 2018-04-19 ENCOUNTER — TRANSFERRED RECORDS (OUTPATIENT)
Dept: HEALTH INFORMATION MANAGEMENT | Facility: CLINIC | Age: 48
End: 2018-04-19

## 2018-05-22 ENCOUNTER — TRANSFERRED RECORDS (OUTPATIENT)
Dept: HEALTH INFORMATION MANAGEMENT | Facility: CLINIC | Age: 48
End: 2018-05-22

## 2018-05-24 ENCOUNTER — TELEPHONE (OUTPATIENT)
Dept: FAMILY MEDICINE | Facility: OTHER | Age: 48
End: 2018-05-24

## 2018-05-24 NOTE — TELEPHONE ENCOUNTER
Please abstract the following data from this visit with this patient into the appropriate field in Epic:    Pap smear done on this date: 02/01/2016 (approximately), by this group: [facility], results in care everywhere.     Carolann Mesa      Panel Management Review      Patient has the following on her problem list: None      Composite cancer screening  Chart review shows that this patient is due/due soon for the following Pap Smear  Summary:    Patient is due/failing the following:   PAP    Action needed:   Patient needs office visit for PAP.    Type of outreach:    none per care everywhere UTD    Questions for provider review:    None                                                                                                                                    Carolann Mesa       Chart routed to Care Team .

## 2018-06-04 NOTE — PROGRESS NOTES
"  SUBJECTIVE:   Socorro Etienne is a 47 year old female who presents to clinic today for the following health issues:    HPI    Joint Pain    Onset: 1 week ago    Description:   Location: left ankle  Character: none    Intensity: 0/10    Progression of Symptoms: worse    Accompanying Signs & Symptoms:  Other symptoms: numbness, tingling, swelling and right foot toes lock    History:   Previous similar pain: no       Precipitating factors:   Trauma or overuse: no     Alleviating factors:  Improved by: nothing    Therapies Tried and outcome: elevate legs    She has been having swelling in her left ankle for a week now. She feels like she has an \"elephant foot\", showed her mom and she was concerned as well.     Concern - Fatigue  Onset: several years    Description:   Fatigue, dizziness, lightheaded    Intensity: 0/10    Progression of Symptoms:  same    Accompanying Signs & Symptoms:  Fatigue, dizziness, lightheaded    Previous history of similar problem:   Has had anemia prior with iron infusions last June    Precipitating factors:   Worsened by: none    Alleviating factors:  Improved by: none    Therapies Tried and outcome: Adjusted thyroid medication, hasn't improved fatigue much.     She is feeling very tired and fatigued. Her thyroid and prolactin were both off with her most recent labs, her endocrinologist corrected these. She does note that in the past she was found to be insulin resistant, and pre-diabetic. She also gained a lot of weight at the beginning of the year because her iron supplement started binding with her synthroid. She was switched to the iron infusion, but her weight has continued to go up.       She states that her prolactin and thyroid \"went wacky\" her endocrinologist who is doing some of her medication management upped her synthroid dosage and her levels are back to normal now.       Labs    There are other labs she would also like to have done today, including an iron level, and checking " her hormone levels, FSH, LH, Estrogen. She has a packet of labs that she had done with her previous provider. She had her Mammogram and DEXA scan done this year and has copies of these as well.     She had a positive HPV screen, and had a negative colposcopy. Updated PAP order to reflect an annual screening with the high risk HPV being present.     She has some more information about the MTHFR mutation that she has. She went to see a  who didn't have any more answers for her because of how little research has been done yet on this particular genetic disorder. She is conflicted about whether or not to be supplementing with folate.       Problem list and histories reviewed & adjusted, as indicated.  Additional history: as documented    Patient Active Problem List   Diagnosis     Infertility     Oligomenorrhea     Hyperprolactinemia (H)     Obesity     Hypothyroidism     PCOS (polycystic ovarian syndrome)     Pituitary microadenoma with hyperprolactinemia (H)     Female hirsutism     Hyperlipidemia LDL goal <130     History of depression     Abdominal pain, right lower quadrant     Family history of cervical cancer     Family history of colon cancer     Iron deficiency anemia due to chronic blood loss     Vitamin D deficiency     Ovarian cyst     Postoperative surgical complication involving both eyes     MTHFR gene mutation (H)     Hiatal hernia     Morbid obesity (H)     Past Surgical History:   Procedure Laterality Date     ABDOMEN SURGERY           BACK SURGERY  No surgeries    cervical/lumbar history of treatment     BREAST SURGERY       C  DELIVERY ONLY       DILATION AND CURETTAGE SUCTION  10/19/2012    Procedure: DILATION AND CURETTAGE SUCTION;  SUCTION D&C;  Surgeon: Muriel Purcell MD;  Location: [facility]     GENITOURINARY SURGERY       GI SURGERY      Gallbladder     HC BIOPSY OF BREAST, OPEN INCISIONAL  1999     HC COLONOSCOPY THRU STOMA, DIAGNOSTIC       normal     HC REMOVAL GALLBLADDER  10/2003     HC TOOTH EXTRACTION W/FORCEP  1999     LAPAROSCOPIC SALPINGECTOMY Left 4/6/2017    Procedure: LAPAROSCOPIC SALPINGECTOMY;  Surgeon: Muriel Purcell MD;  Location: [facility]     LAPAROSCOPIC SALPINGO-OOPHORECTOMY Right 4/6/2017    Procedure: LAPAROSCOPIC SALPINGO-OOPHORECTOMY;  Surgeon: Muriel Purcell MD;  Location: [facility]     LAPAROSCOPY DIAGNOSTIC (GYN)  10/25/2013    Procedure: LAPAROSCOPY DIAGNOSTIC (GYN);  DIAGNOSTIC LAPAROSCOPY;  Surgeon: Muriel Purcell MD;  Location: [facility]     ORTHOPEDIC SURGERY  no surgeries    BRUNA wrists, RT shoulder, BRUNA ankles/feet     SOFT TISSUE SURGERY         Social History   Substance Use Topics     Smoking status: Never Smoker     Smokeless tobacco: Never Used     Alcohol use No     Family History   Problem Relation Age of Onset     HEART DISEASE Mother      MI @ age 53, stented     Coronary Artery Disease Mother      Hypertension Mother      Depression Mother      Anxiety Disorder Mother      MENTAL ILLNESS Mother      Thyroid Disease Mother      Obesity Mother      DIABETES Mother      Hyperlipidemia Mother      Cardiovascular Father      Bypass in his late 50's     DIABETES Father      Cancer - colorectal Father      in his 50's, small bowel resection     Coronary Artery Disease Father      CEREBROVASCULAR DISEASE Father      Colon Cancer Father      Hypertension Sister      Cervical Cancer Sister      Thyroid Disease Sister      Psychotic Disorder Sister      bipolar     Psychotic Disorder Brother      poss schizophrenia     HEART DISEASE Brother      murmur     Coronary Artery Disease Maternal Grandfather      Coronary Artery Disease Paternal Grandmother      OSTEOPOROSIS Paternal Grandmother      Hypertension Maternal Half-Sister      Anxiety Disorder Maternal Half-Sister      Substance Abuse Maternal Half-Sister      MENTAL ILLNESS Maternal Half-Sister      Asthma Maternal Half-Sister      Thyroid  Disease Maternal Half-Sister      Obesity Maternal Half-Sister      Breast Cancer Other      Substance Abuse Other      Other Cancer Maternal Half-Sister      Anxiety Disorder Paternal Half-Brother      MENTAL ILLNESS Maternal Grandmother      Alzheimer Disease Maternal Grandmother      Asthma Son      Obesity Other      Multiple Sclerosis Other      Asthma Niece      Hypertension Son      Hyperlipidemia Son      Depression Son      Anxiety Disorder Son      Asthma Son      Genetic Disorder Son      MTHFR     Thyroid Disease Son      Obesity Son            ROS:  Constitutional, HEENT, cardiovascular, pulmonary, GI, , musculoskeletal, neuro, skin, endocrine and psych systems are negative, except as in HPI or otherwise noted.     This document serves as a record of the services and decisions personally performed and made by Brittanie Elkins MD. It was created on her behalf by Jennifer Santillan, a trained medical scribe. The creation of this document is based the provider's statements to the medical scribe.  Jennifer Santillan, June 7, 2018 4:38 PM     OBJECTIVE:                                                    /70 (BP Location: Right arm, Patient Position: Chair, Cuff Size: Adult Regular)  Pulse 88  Temp 99.2  F (37.3  C) (Temporal)  Resp 12  Wt 218 lb (98.9 kg)  LMP 04/29/2018 (Exact Date)  BMI 39.87 kg/m2  Body mass index is 39.87 kg/(m^2).   GENERAL: healthy, alert, well nourished, well hydrated, no distress, obese  MS: no gross MS defects; swelling in left ankle, non-pitting edema  SKIN: no suspicious lesions, no rashes to visible skin  PSYCH: Alert and oriented times 3; speech- coherent , normal rate and volume; able to articulate logical thoughts, able to abstract reason, no tangential thoughts, no hallucinations or delusions, affect- normal    Diagnostic test results:  Results for orders placed or performed in visit on 06/07/18 (from the past 24 hour(s))   CBC with platelets   Result Value Ref Range    WBC 8.1 4.0 -  11.0 10e9/L    RBC Count 4.85 3.8 - 5.2 10e12/L    Hemoglobin 14.2 11.7 - 15.7 g/dL    Hematocrit 43.5 35.0 - 47.0 %    MCV 90 78 - 100 fl    MCH 29.3 26.5 - 33.0 pg    MCHC 32.6 31.5 - 36.5 g/dL    RDW 12.9 10.0 - 15.0 %    Platelet Count 298 150 - 450 10e9/L   Hemoglobin A1c   Result Value Ref Range    Hemoglobin A1C 5.6 0 - 5.6 %        ASSESSMENT/PLAN:                                                        ICD-10-CM    1. MTHFR gene mutation (H) E72.12 Iron and iron binding capacity     Ferritin     Follicle stimulating hormone   2. Morbid obesity (H) E66.01 Hemoglobin A1c   3. Vitamin D deficiency E55.9 Vitamin D Deficiency   4. Hyperprolactinemia (H) E22.1    5. Hypothyroidism, unspecified type E03.9    6. PCOS (polycystic ovarian syndrome) E28.2 Follicle stimulating hormone     Lutropin   7. Pituitary microadenoma with hyperprolactinemia (H) D35.2     E22.9    8. Chronic fatigue R53.82 Iron and iron binding capacity     Ferritin     Follicle stimulating hormone     Lutropin     CBC with platelets     Vitamin D Deficiency     Hemoglobin A1c   9. Encounter for lipid screening for cardiovascular disease Z13.220 Lipid panel reflex to direct LDL Non-fasting    Z13.6      Labs: Will recheck a glucose and A1c today, there is a good possibility that she has transitioned from pre-diabetes to full fledged diabetes and this is causing her the fatigue, as she is not processing as well. Discussed going to a pre-diabetes class, learning how to use a glucose meter, some nutritional guidance for diabetics.     Fatigue is non-specific and can be difficult to identify. Is on hormonal replacement, so we discussed that the labs can be less telling and that if she is not feeling well med adjustments can be considered. Several labs were ordered to try to help identify the source given her high risk history with the pituitary microadenoma and hyperprolactinemia, hyothyroid, etc. Reviewed labs today and sent for  scanning/abstracting. Also sent mammo and pap results for HM to abstracting.    Order placed for labs that the patient will complete today.    Will plan to follow-up to discuss lab results when they are finished.       Patient Instructions   Plan to schedule a follow up to discuss your la results when they are all completed.       The information in this document, created by the medical scribe for me, accurately reflects the services I personally performed and the decisions made by me. I have reviewed and approved this document for accuracy.     Brittanie Elkins MD, MD  [facility]

## 2018-06-05 ENCOUNTER — TRANSFERRED RECORDS (OUTPATIENT)
Dept: HEALTH INFORMATION MANAGEMENT | Facility: CLINIC | Age: 48
End: 2018-06-05

## 2018-06-05 LAB — TSH SERPL-ACNC: 0.78 UIU/ML (ref 0.3–5)

## 2018-06-07 ENCOUNTER — OFFICE VISIT (OUTPATIENT)
Dept: FAMILY MEDICINE | Facility: OTHER | Age: 48
End: 2018-06-07
Payer: COMMERCIAL

## 2018-06-07 VITALS
WEIGHT: 218 LBS | TEMPERATURE: 99.2 F | DIASTOLIC BLOOD PRESSURE: 70 MMHG | RESPIRATION RATE: 12 BRPM | HEART RATE: 88 BPM | SYSTOLIC BLOOD PRESSURE: 120 MMHG | BODY MASS INDEX: 39.87 KG/M2

## 2018-06-07 DIAGNOSIS — E28.2 PCOS (POLYCYSTIC OVARIAN SYNDROME): ICD-10-CM

## 2018-06-07 DIAGNOSIS — Z13.6 ENCOUNTER FOR LIPID SCREENING FOR CARDIOVASCULAR DISEASE: ICD-10-CM

## 2018-06-07 DIAGNOSIS — E22.1 HYPERPROLACTINEMIA (H): ICD-10-CM

## 2018-06-07 DIAGNOSIS — R53.82 CHRONIC FATIGUE: ICD-10-CM

## 2018-06-07 DIAGNOSIS — E66.01 MORBID OBESITY (H): ICD-10-CM

## 2018-06-07 DIAGNOSIS — E55.9 VITAMIN D DEFICIENCY: ICD-10-CM

## 2018-06-07 DIAGNOSIS — E03.9 HYPOTHYROIDISM, UNSPECIFIED TYPE: ICD-10-CM

## 2018-06-07 DIAGNOSIS — Z13.220 ENCOUNTER FOR LIPID SCREENING FOR CARDIOVASCULAR DISEASE: ICD-10-CM

## 2018-06-07 DIAGNOSIS — Z15.89 MTHFR GENE MUTATION: Primary | ICD-10-CM

## 2018-06-07 DIAGNOSIS — E22.9 PITUITARY MICROADENOMA WITH HYPERPROLACTINEMIA (H): ICD-10-CM

## 2018-06-07 DIAGNOSIS — D35.2 PITUITARY MICROADENOMA WITH HYPERPROLACTINEMIA (H): ICD-10-CM

## 2018-06-07 PROBLEM — K44.9 HIATAL HERNIA: Status: ACTIVE | Noted: 2018-06-07

## 2018-06-07 LAB
CHOLEST SERPL-MCNC: 253 MG/DL
ERYTHROCYTE [DISTWIDTH] IN BLOOD BY AUTOMATED COUNT: 12.9 % (ref 10–15)
FERRITIN SERPL-MCNC: 128 NG/ML (ref 8–252)
HBA1C MFR BLD: 5.6 % (ref 0–5.6)
HCT VFR BLD AUTO: 43.5 % (ref 35–47)
HDLC SERPL-MCNC: 35 MG/DL
HGB BLD-MCNC: 14.2 G/DL (ref 11.7–15.7)
IRON SATN MFR SERPL: 10 % (ref 15–46)
IRON SERPL-MCNC: 37 UG/DL (ref 35–180)
LDLC SERPL CALC-MCNC: 153 MG/DL
MCH RBC QN AUTO: 29.3 PG (ref 26.5–33)
MCHC RBC AUTO-ENTMCNC: 32.6 G/DL (ref 31.5–36.5)
MCV RBC AUTO: 90 FL (ref 78–100)
NONHDLC SERPL-MCNC: 218 MG/DL
PLATELET # BLD AUTO: 298 10E9/L (ref 150–450)
RBC # BLD AUTO: 4.85 10E12/L (ref 3.8–5.2)
TIBC SERPL-MCNC: 353 UG/DL (ref 240–430)
TRIGL SERPL-MCNC: 323 MG/DL
WBC # BLD AUTO: 8.1 10E9/L (ref 4–11)

## 2018-06-07 PROCEDURE — 83001 ASSAY OF GONADOTROPIN (FSH): CPT | Performed by: FAMILY MEDICINE

## 2018-06-07 PROCEDURE — 85027 COMPLETE CBC AUTOMATED: CPT | Performed by: FAMILY MEDICINE

## 2018-06-07 PROCEDURE — 83540 ASSAY OF IRON: CPT | Performed by: FAMILY MEDICINE

## 2018-06-07 PROCEDURE — 82306 VITAMIN D 25 HYDROXY: CPT | Performed by: FAMILY MEDICINE

## 2018-06-07 PROCEDURE — 82728 ASSAY OF FERRITIN: CPT | Performed by: FAMILY MEDICINE

## 2018-06-07 PROCEDURE — 83002 ASSAY OF GONADOTROPIN (LH): CPT | Performed by: FAMILY MEDICINE

## 2018-06-07 PROCEDURE — 83550 IRON BINDING TEST: CPT | Performed by: FAMILY MEDICINE

## 2018-06-07 PROCEDURE — 36415 COLL VENOUS BLD VENIPUNCTURE: CPT | Performed by: FAMILY MEDICINE

## 2018-06-07 PROCEDURE — 80061 LIPID PANEL: CPT | Performed by: FAMILY MEDICINE

## 2018-06-07 PROCEDURE — 99214 OFFICE O/P EST MOD 30 MIN: CPT | Performed by: FAMILY MEDICINE

## 2018-06-07 PROCEDURE — 83036 HEMOGLOBIN GLYCOSYLATED A1C: CPT | Performed by: FAMILY MEDICINE

## 2018-06-07 RX ORDER — LEVOMEFOLATE CALCIUM 7.5 MG TABLET
7.5 TABLET DAILY
COMMUNITY
End: 2018-11-15

## 2018-06-07 NOTE — MR AVS SNAPSHOT
After Visit Summary   6/7/2018    Socorro Etienne    MRN: [id number]           Patient Information     Date Of Birth          1970        Visit Information        Provider Department      6/7/2018 4:15 PM Brittanie Elkins MD [facility]        Today's Diagnoses     MTHFR gene mutation (H)    -  1    Morbid obesity (H)        Vitamin D deficiency        Hyperprolactinemia (H)        Hypothyroidism, unspecified type        PCOS (polycystic ovarian syndrome)        Pituitary microadenoma with hyperprolactinemia (H)        Chronic fatigue        Encounter for lipid screening for cardiovascular disease          Care Instructions    Plan to schedule a follow up to discuss your la results when they are all completed.           Follow-ups after your visit        Who to contact     If you have questions or need follow up information about today's clinic visit or your schedule please contact [facility] directly at [phone].  Normal or non-critical lab and imaging results will be communicated to you by [employer], letter or phone within 4 business days after the clinic has received the results. If you do not hear from us within 7 days, please contact the clinic through [employer] or phone. If you have a critical or abnormal lab result, we will notify you by phone as soon as possible.  Submit refill requests through [employer] or call your pharmacy and they will forward the refill request to us. Please allow 3 business days for your refill to be completed.          Additional Information About Your Visit        MyChart Information     [employer] gives you secure access to your electronic health record. If you see a primary care provider, you can also send messages to your care team and make appointments. If you have questions, please call your primary care clinic.  If you do not have a primary care provider, please call [phone] and they will assist you.        Care EveryWhere ID      This is your Care EveryWhere ID. This could be used by other organizations to access your [facility] medical records  [id number]        Your Vitals Were     Pulse Temperature Respirations Last Period BMI (Body Mass Index)       88 99.2  F (37.3  C) (Temporal) 12 04/29/2018 (Exact Date) 39.87 kg/m2        Blood Pressure from Last 3 Encounters:   06/07/18 120/70   03/29/18 138/76   04/11/17 (!) 141/103    Weight from Last 3 Encounters:   06/07/18 218 lb (98.9 kg)   03/29/18 216 lb (98 kg)   04/06/17 203 lb 4.2 oz (92.2 kg)              We Performed the Following     CBC with platelets     Ferritin     Follicle stimulating hormone     Hemoglobin A1c     Iron and iron binding capacity     Lipid panel reflex to direct LDL Non-fasting     Lutropin     Vitamin D Deficiency          Today's Medication Changes          These changes are accurate as of 6/7/18  7:54 PM.  If you have any questions, ask your nurse or doctor.               These medicines have changed or have updated prescriptions.        Dose/Directions    MINIVELLE 0.1 MG/24HR BIW patch   This may have changed:  Another medication with the same name was removed. Continue taking this medication, and follow the directions you see here.   Generic drug:  estradiol   Changed by:  Brittanie Elkins MD        Dose:  1 patch   Place 1 patch onto the skin twice a week   Refills:  0                Primary Care Provider Office Phone # Fax #    Muriel Quintana MD Jazzy [phone]       [facility] [address] [facility] [address]  [facility] [address]        Equal Access to Services     [facility] AH: Hadii michael adan hadasho Soomaali, waaxda luqadaha, qaybta kaalmada adeegyada, jyotsna feldman. So [facility] [phone].    ATENCIÓN: Si habla español, tiene a adame disposición servicios gratuitos de asistencia lingüística. Llame al [phone].    We comply with applicable federal civil rights laws and [place] laws. We do not  discriminate on the basis of race, color, national origin, age, disability, sex, sexual orientation, or gender identity.            Thank you!     Thank you for choosing [facility]  for your care. Our goal is always to provide you with excellent care. Hearing back from our patients is one way we can continue to improve our services. Please take a few minutes to complete the written survey that you may receive in the mail after your visit with us. Thank you!             Your Updated Medication List - Protect others around you: Learn how to safely use, store and throw away your medicines at www.disposemymeds.org.          This list is accurate as of 6/7/18  7:54 PM.  Always use your most recent med list.                   Brand Name Dispense Instructions for use Diagnosis    CYTOMEL PO      Take 5 mcg by mouth 2 times daily        GLUCOPHAGE XR PO      Take 1,000 mg by mouth 2 times daily (with meals)        LINZESS PO      Take 290 mcg by mouth every morning (before breakfast)        methylfolate 7.5 MG Tabs tablet    DEPLIN     Take 7.5 mg by mouth daily        MINIVELLE 0.1 MG/24HR BIW patch   Generic drug:  estradiol      Place 1 patch onto the skin twice a week        oxyCODONE-acetaminophen 5-325 MG per tablet    PERCOCET    20 tablet    Take 1-2 tablets by mouth every 4 hours as needed for pain (moderate to severe)    Cyst of right ovary       PARLODEL PO      Take 1.5 tablets by mouth At Bedtime        PRILOSEC PO      Take 20 mg by mouth every morning        progesterone 200 MG capsule    PROMETRIUM     Take 200 mg by mouth See Admin Instructions 12 days per month        SYNTHROID PO      Take 112 mcg by mouth daily        TIZANIDINE HCL PO      Take 2 mg by mouth daily as needed for muscle spasms        VAGIFEM 10 MCG Tabs vaginal tablet   Generic drug:  estradiol      Place 10 mcg vaginally three times a week        Vitamin D3 2000 units Tabs     100 tablet    TAKE 1 TABLET BY MOUTH EVERY  DAY. START AFTER 50,000 UNIT WEEKY SUPPLEMENT HAS FINISHED    Vitamin D deficiency, unspecified

## 2018-06-07 NOTE — NURSING NOTE
"Chief Complaint   Patient presents with     LAB REQUEST     Panel Management     Lipids       Initial /70 (BP Location: Right arm, Patient Position: Chair, Cuff Size: Adult Regular)  Pulse 88  Temp 99.2  F (37.3  C) (Temporal)  Resp 12  Wt 218 lb (98.9 kg)  LMP 2018 (Exact Date)  BMI 39.87 kg/m2 Estimated body mass index is 39.87 kg/(m^2) as calculated from the following:    Height as of 3/29/18: 5' 2\" (1.575 m).    Weight as of this encounter: 218 lb (98.9 kg).  BP completed using cuff size: regular        The following HM Due: NONE      The following patient reported/Care Every where data was sent to:  P ABSTRACT QUALITY INITIATIVES [49040]       N/a    Brittanie Jarquin, RACHEL  2018           "

## 2018-06-08 LAB
DEPRECATED CALCIDIOL+CALCIFEROL SERPL-MC: 32 UG/L (ref 20–75)
FSH SERPL-ACNC: 32.1 IU/L
LH SERPL-ACNC: 20.5 IU/L

## 2018-06-08 NOTE — PROGRESS NOTES
Socorro, your a1c came in tonight as barely in the normal range. Can consider fasting blood sugar if other labs are not more enlightening. Otherwise the anemia lab is back and normal as well.  Please let me know if you have any questions.    Brittanie Elkins MD

## 2018-06-09 ENCOUNTER — MYC MEDICAL ADVICE (OUTPATIENT)
Dept: FAMILY MEDICINE | Facility: OTHER | Age: 48
End: 2018-06-09

## 2018-06-09 NOTE — PROGRESS NOTES
Socorro, your results were all normal.  The hormonal labs may be mid cycle or postmenopausal and I don't see that we documented last menstrual cycle to clarify.  Please let me know if you have any questions.    Brittanie Elkins MD

## 2018-06-11 ENCOUNTER — MYC MEDICAL ADVICE (OUTPATIENT)
Dept: FAMILY MEDICINE | Facility: OTHER | Age: 48
End: 2018-06-11

## 2018-06-11 ENCOUNTER — HEALTH MAINTENANCE LETTER (OUTPATIENT)
Age: 48
End: 2018-06-11

## 2018-06-11 DIAGNOSIS — M79.89 LEG SWELLING: ICD-10-CM

## 2018-06-11 DIAGNOSIS — R53.82 CHRONIC FATIGUE: ICD-10-CM

## 2018-06-11 DIAGNOSIS — E66.01 MORBID OBESITY (H): Primary | ICD-10-CM

## 2018-06-25 ENCOUNTER — TELEPHONE (OUTPATIENT)
Dept: FAMILY MEDICINE | Facility: OTHER | Age: 48
End: 2018-06-25

## 2018-06-25 NOTE — TELEPHONE ENCOUNTER
You placed a referral for patient to bariatrics on 6/11/18.  Patient has not scheduled as of yet.      Please review and forward to team if follow up with the patient is needed.     Thank you!  Christin/Clinic Referrals [facility]

## 2018-06-27 ENCOUNTER — MYC MEDICAL ADVICE (OUTPATIENT)
Dept: FAMILY MEDICINE | Facility: OTHER | Age: 48
End: 2018-06-27

## 2018-06-27 DIAGNOSIS — E66.01 MORBID OBESITY (H): Primary | ICD-10-CM

## 2018-06-27 NOTE — LETTER
2018         Brittanie Elkins MD   [address] [address]  Tel. [phone] Fax [phone]       Re: Socorro Etienne  : 1970      Dear To Whom It May Concern,    I provide primary care for the above named patient, Socorro Etienne. I treat her obesity, impaired fasting glucose ( 'pre-diabetes'), hypothyroidism, polycystic ovarian syndrome, and elevated blood pressure. She is on medications to treat her conditions but it is essential that she lose weight to manage her health. Weight loss would greatly benefit her overall health and I believe that it is medically necessary and essential for her to do so. I recommend that she be managed at [facility] for her weight loss. Please contact me by phone at the above number or by letter if you have any further questions or concerns.       Sincerely,              Brittanie Elkins MD

## 2018-06-28 NOTE — TELEPHONE ENCOUNTER
Please see what needs to be faxed to Samantha or Liliana. Just weight and indication or list of things she has done prior. Thanks.  Brittanie Elkins MD

## 2018-06-29 NOTE — TELEPHONE ENCOUNTER
[facility] called back and stated they have not requested any information from pt.  Number listed is not pts phone number.  Will send mychart to pt to clarify who needs what information.  Louise Godinez, CMA

## 2018-07-02 DIAGNOSIS — M79.89 LEG SWELLING: ICD-10-CM

## 2018-07-02 DIAGNOSIS — R53.82 CHRONIC FATIGUE: ICD-10-CM

## 2018-07-02 LAB — CRP SERPL-MCNC: 10.5 MG/L (ref 0–8)

## 2018-07-02 PROCEDURE — 36415 COLL VENOUS BLD VENIPUNCTURE: CPT | Performed by: FAMILY MEDICINE

## 2018-07-02 PROCEDURE — 86140 C-REACTIVE PROTEIN: CPT | Performed by: FAMILY MEDICINE

## 2018-07-04 ENCOUNTER — MYC MEDICAL ADVICE (OUTPATIENT)
Dept: FAMILY MEDICINE | Facility: OTHER | Age: 48
End: 2018-07-04

## 2018-07-06 DIAGNOSIS — R53.82 CHRONIC FATIGUE: ICD-10-CM

## 2018-07-06 DIAGNOSIS — M79.89 LEG SWELLING: ICD-10-CM

## 2018-07-06 PROCEDURE — 86753 PROTOZOA ANTIBODY NOS: CPT | Mod: 90 | Performed by: FAMILY MEDICINE

## 2018-07-06 PROCEDURE — 99000 SPECIMEN HANDLING OFFICE-LAB: CPT | Performed by: FAMILY MEDICINE

## 2018-07-06 PROCEDURE — 86200 CCP ANTIBODY: CPT | Performed by: FAMILY MEDICINE

## 2018-07-06 PROCEDURE — 86431 RHEUMATOID FACTOR QUANT: CPT | Performed by: FAMILY MEDICINE

## 2018-07-06 PROCEDURE — 86753 PROTOZOA ANTIBODY NOS: CPT | Mod: 59 | Performed by: FAMILY MEDICINE

## 2018-07-06 PROCEDURE — 86618 LYME DISEASE ANTIBODY: CPT | Performed by: FAMILY MEDICINE

## 2018-07-06 PROCEDURE — 85613 RUSSELL VIPER VENOM DILUTED: CPT | Performed by: FAMILY MEDICINE

## 2018-07-06 PROCEDURE — 86666 EHRLICHIA ANTIBODY: CPT | Mod: 90 | Performed by: FAMILY MEDICINE

## 2018-07-06 PROCEDURE — 36415 COLL VENOUS BLD VENIPUNCTURE: CPT | Performed by: FAMILY MEDICINE

## 2018-07-06 PROCEDURE — 85730 THROMBOPLASTIN TIME PARTIAL: CPT | Performed by: FAMILY MEDICINE

## 2018-07-06 PROCEDURE — 86038 ANTINUCLEAR ANTIBODIES: CPT | Performed by: FAMILY MEDICINE

## 2018-07-08 LAB
A PHAGOCYTOPH IGG TITR SER IF: NORMAL {TITER}
A PHAGOCYTOPH IGM TITR SER IF: NORMAL {TITER}

## 2018-07-09 ENCOUNTER — MYC MEDICAL ADVICE (OUTPATIENT)
Dept: FAMILY MEDICINE | Facility: OTHER | Age: 48
End: 2018-07-09

## 2018-07-09 LAB
ANA SER QL IF: NEGATIVE
B BURGDOR IGG+IGM SER QL: 0.07 (ref 0–0.89)
CCP AB SER IA-ACNC: 1 U/ML
RHEUMATOID FACT SER NEPH-ACNC: <20 IU/ML (ref 0–20)

## 2018-07-10 LAB — LA PPP-IMP: NEGATIVE

## 2018-07-10 NOTE — PROGRESS NOTES
Socorro, your results so far are all normal. Awaiting more testing.  Please let me know if you have any questions.    Brittanie Elkins MD

## 2018-07-10 NOTE — PROGRESS NOTES
Socorro, your results now show lupus labs are normal. Only 1 of the infection labs are left, so we have not had a reason for your symptoms and increased inflammation lab yet.  Please let me know if you have any questions.    Brittanie Elkins MD

## 2018-07-11 ENCOUNTER — TELEPHONE (OUTPATIENT)
Dept: FAMILY MEDICINE | Facility: OTHER | Age: 48
End: 2018-07-11

## 2018-07-11 ENCOUNTER — MYC MEDICAL ADVICE (OUTPATIENT)
Dept: FAMILY MEDICINE | Facility: OTHER | Age: 48
End: 2018-07-11

## 2018-07-11 DIAGNOSIS — R79.82 CRP ELEVATED: ICD-10-CM

## 2018-07-11 DIAGNOSIS — M25.469 JOINT SWELLING OF LOWER LEG: Primary | ICD-10-CM

## 2018-07-11 NOTE — TELEPHONE ENCOUNTER
You placed a referral for patient to bariatrics on 6/28/18.  Patient has not scheduled as of yet.      Please review and forward to team if follow up with the patient is needed.     Thank you!  Christin/Clinic Referrals [facility] II

## 2018-07-12 ENCOUNTER — MYC MEDICAL ADVICE (OUTPATIENT)
Dept: FAMILY MEDICINE | Facility: OTHER | Age: 48
End: 2018-07-12

## 2018-07-12 LAB
B MICROTI IGG TITR SER: NORMAL {TITER}
B MICROTI IGM TITR SER: NORMAL {TITER}

## 2018-07-12 NOTE — PROGRESS NOTES
Socorro, your last test is in and negative.    Please let me know if you have any questions.    Brittanie Elkins MD

## 2018-07-12 NOTE — TELEPHONE ENCOUNTER
Referral was placed for [facility] if she would like to visit with them. Otherwise will need update for referral.  Please see if we can assist her in scheduling.  Brittanie Elkins MD

## 2018-07-13 NOTE — TELEPHONE ENCOUNTER
Not a managed care plan so no need for an insurance referral.  I sent patient message back to get more information- they may be wanting a letter of medical necessity from the provider?

## 2018-07-16 NOTE — TELEPHONE ENCOUNTER
Patient responded and replied.  There was already a letter of medical necessity done and faxed.  This was refaxed.

## 2018-07-17 ENCOUNTER — MYC MEDICAL ADVICE (OUTPATIENT)
Dept: FAMILY MEDICINE | Facility: OTHER | Age: 48
End: 2018-07-17

## 2018-07-17 NOTE — TELEPHONE ENCOUNTER
Looking over the letter, I probably could have called it morbid obesity as that is actually her diagnosis. May want to contact the manager to find out what is qualified to clarify if any updates would be helpful. BMI 39.87 at last ov.  Brittanie Elkins MD

## 2018-07-23 NOTE — TELEPHONE ENCOUNTER
LM for Samantha from the weight loss program to call back.  LM of her DX of morbid obesity and BMI of 39.87 to see if this qualifies her and/or what is needed to see if we can get her scheduled.  Will await her call back.

## 2018-07-25 NOTE — TELEPHONE ENCOUNTER
We need to let Socorro know. The insurance has changed its criteria from prior and she will not qualify for weight loss program.  Brittanie Elkins MD

## 2018-07-25 NOTE — TELEPHONE ENCOUNTER
Message rec'd  from Alix at [facility].  She the qualifying diagnosis patients need is to have a confirmed diagnosis of diabetes with supporting documentation of glucose.  Prediabetes does not qualify.  Alix states she also faxed this info yesterday.  Will forward to provider as FYI.

## 2018-07-25 NOTE — TELEPHONE ENCOUNTER
Message rec'd from Alix at [facility] to call back and that she has a confidential VM so OK to LM.  LM of below again at to call either myself or anyone on Dr Elkins's team if that qualifies her or what is needed to qualify a patient if that does not qualify her.

## 2018-07-31 ENCOUNTER — MYC MEDICAL ADVICE (OUTPATIENT)
Dept: FAMILY MEDICINE | Facility: OTHER | Age: 48
End: 2018-07-31

## 2018-08-23 ENCOUNTER — TRANSFERRED RECORDS (OUTPATIENT)
Dept: HEALTH INFORMATION MANAGEMENT | Facility: CLINIC | Age: 48
End: 2018-08-23

## 2018-08-24 ENCOUNTER — TELEPHONE (OUTPATIENT)
Dept: FAMILY MEDICINE | Facility: OTHER | Age: 48
End: 2018-08-24

## 2018-08-24 NOTE — TELEPHONE ENCOUNTER
You placed a referral to  [facility],  on 7/12/18.    It is unclear if the patient has scheduled yet, not finding a report showing they were seen.     Please forward to your team if further follow up is needed to see if they have made this appointment.      Thank you!   Christin/Referral Representative for [facility] II
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
## 2018-09-19 ENCOUNTER — OFFICE VISIT (OUTPATIENT)
Dept: FAMILY MEDICINE | Facility: OTHER | Age: 48
End: 2018-09-19
Payer: COMMERCIAL

## 2018-09-19 VITALS
RESPIRATION RATE: 16 BRPM | HEART RATE: 95 BPM | OXYGEN SATURATION: 97 % | BODY MASS INDEX: 40.6 KG/M2 | WEIGHT: 222 LBS | TEMPERATURE: 98.5 F | SYSTOLIC BLOOD PRESSURE: 136 MMHG | DIASTOLIC BLOOD PRESSURE: 82 MMHG

## 2018-09-19 DIAGNOSIS — R10.31 ABDOMINAL PAIN, RIGHT LOWER QUADRANT: ICD-10-CM

## 2018-09-19 DIAGNOSIS — M79.89 LEFT LEG SWELLING: ICD-10-CM

## 2018-09-19 DIAGNOSIS — E66.01 MORBID OBESITY (H): ICD-10-CM

## 2018-09-19 DIAGNOSIS — G44.209 TENSION HEADACHE: ICD-10-CM

## 2018-09-19 DIAGNOSIS — R20.2 INTERMITTENT TINGLING SENSATION OF LEFT HAND AND FOOT: ICD-10-CM

## 2018-09-19 DIAGNOSIS — D50.0 IRON DEFICIENCY ANEMIA DUE TO CHRONIC BLOOD LOSS: Primary | ICD-10-CM

## 2018-09-19 PROCEDURE — 99215 OFFICE O/P EST HI 40 MIN: CPT | Performed by: FAMILY MEDICINE

## 2018-09-19 RX ORDER — IBUPROFEN 800 MG/1
TABLET, FILM COATED ORAL
COMMUNITY
Start: 2018-09-18 | End: 2018-11-15

## 2018-09-19 RX ORDER — TOPIRAMATE 25 MG/1
TABLET, FILM COATED ORAL
Qty: 70 TABLET | Refills: 0 | Status: SHIPPED | OUTPATIENT
Start: 2018-09-19 | End: 2018-09-19

## 2018-09-19 RX ORDER — ESTRADIOL 0.1 MG/G
CREAM VAGINAL
Refills: 0 | COMMUNITY
Start: 2018-09-11 | End: 2022-03-23

## 2018-09-19 RX ORDER — TOPIRAMATE 25 MG/1
TABLET, FILM COATED ORAL
Qty: 70 TABLET | Refills: 0 | Status: SHIPPED | OUTPATIENT
Start: 2018-09-19 | End: 2018-10-15

## 2018-09-19 NOTE — MR AVS SNAPSHOT
After Visit Summary   9/19/2018    Socorro Etienne    MRN: [id number]           Patient Information     Date Of Birth          1970        Visit Information        Provider Department      9/19/2018 4:30 PM Brittanie Elkins MD [facility]        Today's Diagnoses     Iron deficiency anemia due to chronic blood loss    -  1    Abdominal pain, right lower quadrant        Left leg swelling        Intermittent tingling sensation of left hand and foot           Follow-ups after your visit        Additional Services     NEUROLOGY ADULT REFERRAL       Your provider has referred you for the following:   Consult at [facility] [[email or web address]]: [facility] [phone]   [email or web address]-[place]/    Please be aware that coverage of these services is subject to the terms and limitations of your health insurance plan.  Call member services at your health plan with any benefit or coverage questions.      Please bring the following with you to your appointment:    (1) Any X-Rays, CTs or MRIs which have been performed.  Contact the facility where they were done to arrange for  prior to your scheduled appointment.    (2) List of current medications  (3) This referral request   (4) Any documents/labs given to you for this referral                  Future tests that were ordered for you today     Open Future Orders        Priority Expected Expires Ordered    Echocardiogram Complete Routine  9/19/2019 9/19/2018            Who to contact     If you have questions or need follow up information about today's clinic visit or your schedule please contact [facility] directly at [phone].  Normal or non-critical lab and imaging results will be communicated to you by MyChart, letter or phone within 4 business days after the clinic has received the results. If you do not hear from us within 7 days, please contact the clinic  through [employer] or phone. If you have a critical or abnormal lab result, we will notify you by phone as soon as possible.  Submit refill requests through [employer] or call your pharmacy and they will forward the refill request to us. Please allow 3 business days for your refill to be completed.          Additional Information About Your Visit        [employer] Information     [employer] gives you secure access to your electronic health record. If you see a primary care provider, you can also send messages to your care team and make appointments. If you have questions, please call your primary care clinic.  If you do not have a primary care provider, please call [phone] and they will assist you.        Care EveryWhere ID     This is your Care EveryWhere ID. This could be used by other organizations to access your [place] medical records  [id number]        Your Vitals Were     Pulse Temperature Respirations Pulse Oximetry BMI (Body Mass Index)       95 98.5  F (36.9  C) (Temporal) 16 97% 40.6 kg/m2        Blood Pressure from Last 3 Encounters:   09/19/18 (!) 148/92   06/07/18 120/70   03/29/18 138/76    Weight from Last 3 Encounters:   09/19/18 222 lb (100.7 kg)   06/07/18 218 lb (98.9 kg)   03/29/18 216 lb (98 kg)              We Performed the Following     NEUROLOGY ADULT REFERRAL          Today's Medication Changes          These changes are accurate as of 9/19/18  5:11 PM.  If you have any questions, ask your nurse or doctor.               Stop taking these medicines if you haven't already. Please contact your care team if you have questions.     oxyCODONE-acetaminophen 5-325 MG per tablet   Commonly known as:  PERCOCET   Stopped by:  Brittanie Elkins MD                    Primary Care Provider Office Phone # Fax #    Muriel Lilian Purcell [phone]       [facility] [address]  [facility] [address]        Equal Access to Services     MARA CABRERA : Tello adan  luis Hall, waveliada lulexieadaha, qaybta kasolda radha, jyotsna alessiain hayaan cassidymoi gabrielprakash laCandelariaellen gaston. So [facility] [phone].    ATENCIÓN: Si habla rosetta, tiene a adame disposición servicios gratuitos de asistencia lingüística. Art al [phone].    We comply with applicable federal civil rights laws and [place] laws. We do not discriminate on the basis of race, color, national origin, age, disability, sex, sexual orientation, or gender identity.            Thank you!     Thank you for choosing [facility]  for your care. Our goal is always to provide you with excellent care. Hearing back from our patients is one way we can continue to improve our services. Please take a few minutes to complete the written survey that you may receive in the mail after your visit with us. Thank you!             Your Updated Medication List - Protect others around you: Learn how to safely use, store and throw away your medicines at www.disposemymeds.org.          This list is accurate as of 9/19/18  5:11 PM.  Always use your most recent med list.                   Brand Name Dispense Instructions for use Diagnosis    CYTOMEL PO      Take 5 mcg by mouth 2 times daily        GLUCOPHAGE XR PO      Take 1,000 mg by mouth 2 times daily (with meals)        ibuprofen 800 MG tablet    ADVIL/MOTRIN          LINZESS PO      Take 290 mcg by mouth every morning (before breakfast)        methylfolate 7.5 MG Tabs tablet    DEPLIN     Take 7.5 mg by mouth daily        MINIVELLE 0.1 MG/24HR BIW patch   Generic drug:  estradiol      Place 1 patch onto the skin twice a week        PARLODEL PO      Take 1.5 tablets by mouth At Bedtime        PRILOSEC PO      Take 20 mg by mouth every morning        progesterone 200 MG capsule    PROMETRIUM     Take 200 mg by mouth See Admin Instructions 12 days per month        SYNTHROID PO      Take 112 mcg by mouth daily        TIZANIDINE HCL PO      Take 2 mg by mouth daily as needed for muscle  spasms        * VAGIFEM 10 MCG Tabs vaginal tablet   Generic drug:  estradiol      Place 10 mcg vaginally three times a week        * estradiol 0.1 MG/GM cream    ESTRACE     I 1 GRAM VAGINALLY 3 TIMES Q WK        * Notice:  This list has 2 medication(s) that are the same as other medications prescribed for you. Read the directions carefully, and ask your doctor or other care provider to review them with you.

## 2018-09-20 ENCOUNTER — MYC MEDICAL ADVICE (OUTPATIENT)
Dept: FAMILY MEDICINE | Facility: OTHER | Age: 48
End: 2018-09-20

## 2018-09-25 ENCOUNTER — RADIANT APPOINTMENT (OUTPATIENT)
Dept: CARDIOLOGY | Facility: CLINIC | Age: 48
End: 2018-09-25
Attending: FAMILY MEDICINE
Payer: COMMERCIAL

## 2018-09-25 DIAGNOSIS — M79.89 LEFT LEG SWELLING: ICD-10-CM

## 2018-09-25 PROCEDURE — 93306 TTE W/DOPPLER COMPLETE: CPT

## 2018-09-25 NOTE — NURSING NOTE
Patient presents today for resting echo ordered by MD.   Echo Tech provided patient education.    Echo technician completed resting echo. Data transferred to [facility] for final interpretation through [employer].   Patient education provided about cardiology interpretation and primary provider will be notified of results.    Candelaria Huerta RDCS

## 2018-09-26 NOTE — PROGRESS NOTES
Socorro, your echo results were all normal.    Please let me know if you have any questions.    Brittanie Elkins MD

## 2018-10-09 ENCOUNTER — MYC MEDICAL ADVICE (OUTPATIENT)
Dept: FAMILY MEDICINE | Facility: CLINIC | Age: 48
End: 2018-10-09

## 2018-10-09 ENCOUNTER — TRANSFERRED RECORDS (OUTPATIENT)
Dept: HEALTH INFORMATION MANAGEMENT | Facility: CLINIC | Age: 48
End: 2018-10-09

## 2018-10-09 ENCOUNTER — TELEPHONE (OUTPATIENT)
Dept: FAMILY MEDICINE | Facility: OTHER | Age: 48
End: 2018-10-09

## 2018-10-09 LAB
CREAT SERPL-MCNC: 0.91 MG/DL (ref 0.55–1.02)
GFR SERPL CREATININE-BSD FRML MDRD: >60 ML/MIN/1.73M2
GLUCOSE SERPL-MCNC: 100 MG/DL (ref 70–110)
POTASSIUM SERPL-SCNC: 3.8 MMOL/L (ref 3.5–5.1)
TSH SERPL-ACNC: 1.21 UIU/ML (ref 0.36–3.74)
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
## 2018-10-09 NOTE — TELEPHONE ENCOUNTER
Pt scheduled an appointment for:    review Topamax   chronic/severe headaches,blurred vision,words come out   wrong or I forget the word,forgetting/memory   issues,spasms have returned, fatigue remains, high   blood pressure remains & feel lightheaded/dizzy/woozy.    Next 5 appointments (look out 90 days)     Oct 15, 2018  8:30 AM CDT   Roxanna Hogan with Brittanie Elkins MD   [facility]    [address] [place] [address] [id number] [phone]                Please triage. Phone number is incorrect. Sent a [employer] Message asking pt to call the clinic.    Gema Fernandez, RN, BSN

## 2018-10-10 ENCOUNTER — MYC MEDICAL ADVICE (OUTPATIENT)
Dept: FAMILY MEDICINE | Facility: CLINIC | Age: 48
End: 2018-10-10

## 2018-10-10 ENCOUNTER — TRANSFERRED RECORDS (OUTPATIENT)
Dept: HEALTH INFORMATION MANAGEMENT | Facility: CLINIC | Age: 48
End: 2018-10-10

## 2018-10-10 NOTE — TELEPHONE ENCOUNTER
AE-Pt would like a response sent to her MyChart. Pt went to  ED last night and they did a MRI and instructed her to stop her Topamax. She is now wondering what she should do? Should she decrease dose, stop medication completely, start something else? Please review and advise    Pt states she went to the ED and was told it was a reaction to Topamax. The ER doctor told her to stop. She states she started to have these reaction when increased to 2 in the morning and 2 in the evening. No issues until the week she made this increase.    She took 1 Topamax at 0100 10/10 and 10/9 2 in the AM.    Neurology scheduled at the end of the month.     Gema Fernandez, RN, BSN

## 2018-10-10 NOTE — TELEPHONE ENCOUNTER
Sent pt a [employer] message.    TC/MA please get records from [facility] ED for Dr. Elkins.    Thanks  Gema Fernandez, RN, BSN
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
## 2018-10-10 NOTE — TELEPHONE ENCOUNTER
We do not have any records sent from them yet to find out what happened. Hard to say for sure and should review records. Though likely if she was doing fine at a lower dose, she may be fine. But we need to verify labs, etc to know the urgency. Please see what we can do for the records or if she can reach out to the ED   Brittanie Elkins MD

## 2018-10-11 NOTE — TELEPHONE ENCOUNTER
Records received and ED provider felt Topamax could explain symptoms, so asked to stop med. Per update after, the med was well tolerated at lower doses, so a reduced dose could be used during this transitional time.  Brittanie Elkins MD

## 2018-10-13 ASSESSMENT — PATIENT HEALTH QUESTIONNAIRE - PHQ9
SUM OF ALL RESPONSES TO PHQ QUESTIONS 1-9: 5
SUM OF ALL RESPONSES TO PHQ QUESTIONS 1-9: 5
10. IF YOU CHECKED OFF ANY PROBLEMS, HOW DIFFICULT HAVE THESE PROBLEMS MADE IT FOR YOU TO DO YOUR WORK, TAKE CARE OF THINGS AT HOME, OR GET ALONG WITH OTHER PEOPLE: SOMEWHAT DIFFICULT

## 2018-10-14 DIAGNOSIS — G44.209 TENSION HEADACHE: ICD-10-CM

## 2018-10-14 DIAGNOSIS — E66.01 MORBID OBESITY (H): ICD-10-CM

## 2018-10-14 RX ORDER — TOPIRAMATE 25 MG/1
TABLET, FILM COATED ORAL
Qty: 70 TABLET | Refills: 0 | Status: CANCELLED | OUTPATIENT
Start: 2018-10-14

## 2018-10-14 ASSESSMENT — PATIENT HEALTH QUESTIONNAIRE - PHQ9: SUM OF ALL RESPONSES TO PHQ QUESTIONS 1-9: 5

## 2018-10-15 ENCOUNTER — OFFICE VISIT (OUTPATIENT)
Dept: FAMILY MEDICINE | Facility: OTHER | Age: 48
End: 2018-10-15
Payer: COMMERCIAL

## 2018-10-15 VITALS
WEIGHT: 218 LBS | BODY MASS INDEX: 39.87 KG/M2 | TEMPERATURE: 98.2 F | SYSTOLIC BLOOD PRESSURE: 122 MMHG | OXYGEN SATURATION: 97 % | DIASTOLIC BLOOD PRESSURE: 70 MMHG | HEART RATE: 96 BPM

## 2018-10-15 DIAGNOSIS — H53.2 DOUBLE VISION: ICD-10-CM

## 2018-10-15 DIAGNOSIS — G44.209 TENSION HEADACHE: ICD-10-CM

## 2018-10-15 DIAGNOSIS — R19.7 DIARRHEA, UNSPECIFIED TYPE: ICD-10-CM

## 2018-10-15 DIAGNOSIS — E66.01 MORBID OBESITY (H): ICD-10-CM

## 2018-10-15 DIAGNOSIS — E66.812 CLASS 2 OBESITY WITHOUT SERIOUS COMORBIDITY WITH BODY MASS INDEX (BMI) OF 39.0 TO 39.9 IN ADULT, UNSPECIFIED OBESITY TYPE: ICD-10-CM

## 2018-10-15 DIAGNOSIS — E22.9 PITUITARY MICROADENOMA WITH HYPERPROLACTINEMIA (H): ICD-10-CM

## 2018-10-15 DIAGNOSIS — E78.5 HYPERLIPIDEMIA LDL GOAL <130: ICD-10-CM

## 2018-10-15 DIAGNOSIS — E22.1 HYPERPROLACTINEMIA (H): Primary | ICD-10-CM

## 2018-10-15 DIAGNOSIS — D35.2 PITUITARY MICROADENOMA WITH HYPERPROLACTINEMIA (H): ICD-10-CM

## 2018-10-15 DIAGNOSIS — E55.9 VITAMIN D DEFICIENCY: ICD-10-CM

## 2018-10-15 LAB
CHOLEST SERPL-MCNC: 243 MG/DL
FOLATE SERPL-MCNC: >100 NG/ML
GLUCOSE SERPL-MCNC: 92 MG/DL (ref 70–99)
HDLC SERPL-MCNC: 37 MG/DL
LDLC SERPL CALC-MCNC: 151 MG/DL
NONHDLC SERPL-MCNC: 206 MG/DL
TRIGL SERPL-MCNC: 275 MG/DL
VIT B12 SERPL-MCNC: 339 PG/ML (ref 193–986)

## 2018-10-15 PROCEDURE — 82746 ASSAY OF FOLIC ACID SERUM: CPT | Performed by: FAMILY MEDICINE

## 2018-10-15 PROCEDURE — 99215 OFFICE O/P EST HI 40 MIN: CPT | Performed by: FAMILY MEDICINE

## 2018-10-15 PROCEDURE — 82947 ASSAY GLUCOSE BLOOD QUANT: CPT | Performed by: FAMILY MEDICINE

## 2018-10-15 PROCEDURE — 84425 ASSAY OF VITAMIN B-1: CPT | Mod: 90 | Performed by: FAMILY MEDICINE

## 2018-10-15 PROCEDURE — 83090 ASSAY OF HOMOCYSTEINE: CPT | Performed by: FAMILY MEDICINE

## 2018-10-15 PROCEDURE — 36415 COLL VENOUS BLD VENIPUNCTURE: CPT | Performed by: FAMILY MEDICINE

## 2018-10-15 PROCEDURE — 80061 LIPID PANEL: CPT | Performed by: FAMILY MEDICINE

## 2018-10-15 PROCEDURE — 99000 SPECIMEN HANDLING OFFICE-LAB: CPT | Performed by: FAMILY MEDICINE

## 2018-10-15 PROCEDURE — 82607 VITAMIN B-12: CPT | Performed by: FAMILY MEDICINE

## 2018-10-15 RX ORDER — LEVOTHYROXINE SODIUM 112 UG/1
112 TABLET ORAL DAILY
COMMUNITY
End: 2023-01-19

## 2018-10-15 RX ORDER — TOPIRAMATE 25 MG/1
TABLET, FILM COATED ORAL
Qty: 60 TABLET | Refills: 1 | Status: SHIPPED | OUTPATIENT
Start: 2018-10-15 | End: 2018-10-24

## 2018-10-15 RX ORDER — TOPIRAMATE 25 MG/1
TABLET, FILM COATED ORAL
Qty: 70 TABLET | Refills: 0 | Status: CANCELLED | OUTPATIENT
Start: 2018-10-15

## 2018-10-15 ASSESSMENT — ANXIETY QUESTIONNAIRES: GAD7 TOTAL SCORE: INCOMPLETE

## 2018-10-15 NOTE — PROGRESS NOTES
Socorro, your fasting cholesterol remains elevated and b12 is normal. Other labs still pending.  Please let me know if you have any questions.    Brittanie Elkins MD

## 2018-10-17 LAB — HCYS SERPL-SCNC: 4.5 UMOL/L (ref 4–12)

## 2018-10-18 LAB — VIT B1 BLD-MCNC: 129 NMOL/L (ref 70–180)

## 2018-10-22 ENCOUNTER — MYC MEDICAL ADVICE (OUTPATIENT)
Dept: NEUROLOGY | Facility: CLINIC | Age: 48
End: 2018-10-22

## 2018-10-22 ENCOUNTER — PRE VISIT (OUTPATIENT)
Dept: NEUROLOGY | Facility: CLINIC | Age: 48
End: 2018-10-22

## 2018-10-22 NOTE — TELEPHONE ENCOUNTER
PREVISIT INFORMATION                                                    Socorro Etienne scheduled for future visit at [facility].    Patient is scheduled to see Devika on 10/24  Reason for visit: Intermittent tingling sensation of left hand and foot   Referring provider Brittanie Elkins   Has patient seen previous specialist? No  Medical Records:  Available in chart.  Patient was previously seen at a [place] or [facility] facility. Did request MRI Brain from 10/9/18 be pushed to PACS    REVIEW                                                      New patient packet mailed to patient: Yes  Medication reconciliation complete: Yes      Current Outpatient Prescriptions   Medication Sig Dispense Refill     Bromocriptine Mesylate (PARLODEL PO) Take 1.5 tablets by mouth At Bedtime        estradiol (ESTRACE) 0.1 MG/GM cream I 1 GRAM VAGINALLY 3 TIMES Q WK  0     ibuprofen (ADVIL/MOTRIN) 800 MG tablet        levothyroxine (SYNTHROID) 125 MCG tablet Take 125 mcg by mouth daily       Levothyroxine Sodium (SYNTHROID PO) Take 112 mcg by mouth daily       Linaclotide (LINZESS PO) Take 290 mcg by mouth every morning (before breakfast)       Liothyronine Sodium (CYTOMEL PO) Take 5 mcg by mouth 2 times daily        MetFORMIN HCl (GLUCOPHAGE XR PO) Take 1,000 mg by mouth 2 times daily (with meals)       methylfolate (DEPLIN) 7.5 MG TABS tablet Take 7.5 mg by mouth daily       MINIVELLE 0.1 MG/24HR patch Place 1 patch onto the skin twice a week        Omeprazole (PRILOSEC PO) Take 40 mg by mouth every morning        progesterone (PROMETRIUM) 200 MG capsule Take 200 mg by mouth See Admin Instructions 12 days per month  2     TIZANIDINE HCL PO Take 2 mg by mouth daily as needed for muscle spasms       topiramate (TOPAMAX) 25 MG tablet Take 1 tablet (25 mg) at bedtime for 2 week, then 1 tablet twice daily 60 tablet 1     VAGIFEM 10 MCG TABS Place 10 mcg vaginally three times a week           Allergies: Latex        PLAN/FOLLOW-UP NEEDED                                                      [facility] [employer] message sent for pt to confirm appt     Patient Reminders Given:  Please, make sure you bring an updated list of your medications.   If you are having a procedure, please, present 15 minutes early.  If you need to cancel or reschedule,please call [phone].    Virgen Maddox

## 2018-10-24 ENCOUNTER — OFFICE VISIT (OUTPATIENT)
Dept: NEUROLOGY | Facility: CLINIC | Age: 48
End: 2018-10-24
Attending: FAMILY MEDICINE
Payer: COMMERCIAL

## 2018-10-24 VITALS
RESPIRATION RATE: 18 BRPM | WEIGHT: 220.3 LBS | BODY MASS INDEX: 41.59 KG/M2 | HEIGHT: 61 IN | HEART RATE: 97 BPM | OXYGEN SATURATION: 96 % | TEMPERATURE: 98.9 F | SYSTOLIC BLOOD PRESSURE: 138 MMHG | DIASTOLIC BLOOD PRESSURE: 89 MMHG

## 2018-10-24 DIAGNOSIS — G44.219 EPISODIC TENSION-TYPE HEADACHE, NOT INTRACTABLE: Primary | ICD-10-CM

## 2018-10-24 PROCEDURE — 99204 OFFICE O/P NEW MOD 45 MIN: CPT | Performed by: PSYCHIATRY & NEUROLOGY

## 2018-10-24 RX ORDER — ZONISAMIDE 25 MG/1
CAPSULE ORAL
Qty: 90 CAPSULE | Refills: 1 | Status: SHIPPED | OUTPATIENT
Start: 2018-10-24 | End: 2018-11-28 | Stop reason: SINTOL

## 2018-10-24 ASSESSMENT — PAIN SCALES - GENERAL: PAINLEVEL: NO PAIN (1)
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
## 2018-10-24 NOTE — NURSING NOTE
"Socorro Etienne's goals for this visit include: Consult  She requests these members of her care team be copied on today's visit information: PCP    PCP: Muriel Purcell    Referring Provider:  Brittanie Elkins MD  [address] [place] [address]    /89  Pulse 97  Temp 98.9  F (37.2  C)  Resp 18  Ht 1.549 m (5' 1\")  Wt 99.9 kg (220 lb 4.8 oz)  SpO2 96%  BMI 41.63 kg/m2    Do you need any medication refills at today's visit? N    "

## 2018-10-24 NOTE — MR AVS SNAPSHOT
After Visit Summary   10/24/2018    Socorro Etienne    MRN: [id number]           Patient Information     Date Of Birth          1970        Visit Information        Provider Department      10/24/2018 8:30 AM Leroy Fortune MD [facility]        Today's Diagnoses     Episodic tension-type headache, not intractable    -  1       Follow-ups after your visit        Follow-up notes from your care team     Discussed this visit Return in about 5 weeks (around 11/28/2018).      Your next 10 appointments already scheduled     Oct 25, 2018  9:30 AM CDT   (Arrive by 9:15 AM)   MR SKULL BASE W/O & W CONTRAST with MGMR1   [facility] ([facility])    [address] [place] [address] [phone]           How do I prepare for my exam? (Food and drink instructions) **If you will be receiving sedation or general anesthesia, please see special notes below.**  How do I prepare for my exam? (Other instructions) Take your medicines as usual, unless your doctor tells you not to. You may or may not receive intravenous (IV) contrast for this exam pending the discretion of the Radiologist.  You do not need to do anything special to prepare.  **If you will be receiving sedation or general anesthesia, please see special notes below.**  What should I wear: The MRI machine uses a strong magnet. Please wear clothes without metal (snaps, zippers). A sweatsuit works well, or we may give you a hospital gown. Please remove any body piercings and hair extensions before you arrive. You will also remove watches, jewelry, hairpins, wallets, dentures, partial dental plates and hearing aids. You may wear contact lenses, and you may be able to wear your rings. We have a safe place to keep your personal items, but it is safer to leave them at home.  How long does the exam take: Most tests take 30 to 60 minutes.  HOWEVER, IF YOUR DOCTOR PRESCRIBES ANESTHESIA please  plan on spending four to five hours in the recovery room.  What should I bring:  Bring a list of your current medicines to your exam (including vitamins, minerals and over-the-counter drugs).  Do I need a :  **If you will be receiving sedation or general anesthesia, please see special notes below.**  What should I do after the exam: No Restrictions, You may resume normal activities.  What is this test: MRI (magnetic resonance imaging) uses a strong magnet and radio waves to look inside the body. An MRA (magnetic resonance angiogram) does the same thing, but it lets us look at your blood vessels. A computer turns the radio waves into pictures showing cross sections of the body, much like slices of bread. This helps us see any problems more clearly. You may receive fluid (called  contrast ) before or during your scan. The fluid helps us see the pictures better. We give the fluid through an IV (small needle in your arm).  Who should I call with questions:  Please call the Imaging Department at your exam site with any questions. Directions, parking instructions, and other information is available on our website, [email or web address].  How do I prepare if I m having sedation or anesthesia? **IMPORTANT** THE INSTRUCTIONS BELOW ARE ONLY FOR THOSE PATIENTS WHO HAVE BEEN TOLD THEY WILL RECEIVE SEDATION OR GENERAL ANESTHESIA DURING THEIR MRI PROCEDURE:  IF YOU WILL RECEIVE SEDATION (take medicine to help you relax during your exam): You must get the medicine from your doctor before you arrive. Bring the medicine to the exam. Do not take it at home. Arrive one hour early. Bring someone who can take you home after the test. Your medicine will make you sleepy. After the exam, you may not drive, take a bus or take a taxi by yourself. No eating 8 hours before your exam. You may have clear liquids up until 4 hours before your exam. (Clear liquids include water, clear tea, black coffee and fruit juice without pulp.)  IF YOU  WILL RECEIVE ANESTHESIA (be asleep for your exam): Arrive 1 1/2 hours early. Bring someone who can take you home after the test. You may not drive, take a bus or take a taxi by yourself. No eating 8 hours before your exam. You may have clear liquids up until 4 hours before your exam. (Clear liquids include water, clear tea, black coffee and fruit juice without pulp.)            Nov 15, 2018  8:30 AM CST   Office Visit with Brittanie Elkins MD   [facility]    [address]  [facility] [id number] [phone]           Bring a current list of meds and any records pertaining to this visit. For Physicals, please bring immunization records and any forms needing to be filled out. Please arrive 10 minutes early to complete paperwork.            Nov 28, 2018  8:30 AM CST   Return Visit with Leroy Fortune MD   [facility]    [address] [place] [address] [phone]              Who to contact     If you have questions or need follow up information about today's clinic visit or your schedule please contact [facility] directly at [phone].  Normal or non-critical lab and imaging results will be communicated to you by [email or web address], letter or phone within 4 business days after the clinic has received the results. If you do not hear from us within 7 days, please contact the clinic through [email or web address] or phone. If you have a critical or abnormal lab result, we will notify you by phone as soon as possible.  Submit refill requests through "[employer] or call your pharmacy and they will forward the refill request to us. Please allow 3 business days for your refill to be completed.          Additional Information About Your Visit        "[employer] Information     "[employer] gives you secure access to your electronic health record. If you see a primary care provider, you can also send messages to your  "care team and make appointments. If you have questions, please call your primary care clinic.  If you do not have a primary care provider, please call [phone] and they will assist you.      [employer] is an electronic gateway that provides easy, online access to your medical records. With [employer], you can request a clinic appointment, read your test results, renew a prescription or communicate with your care team.     To access your existing account, please contact your [facility] or call [phone] for assistance.        Care EveryWhere ID     This is your Care EveryWhere ID. This could be used by other organizations to access your [place] medical records  [id number]        Your Vitals Were     Pulse Temperature Respirations Height Pulse Oximetry BMI (Body Mass Index)    97 98.9  F (37.2  C) 18 1.549 m (5' 1\") 96% 41.63 kg/m2       Blood Pressure from Last 3 Encounters:   10/24/18 138/89   10/15/18 122/70   09/19/18 136/82    Weight from Last 3 Encounters:   10/24/18 99.9 kg (220 lb 4.8 oz)   10/15/18 98.9 kg (218 lb)   09/19/18 100.7 kg (222 lb)              Today, you had the following     No orders found for display         Today's Medication Changes          These changes are accurate as of 10/24/18  9:20 AM.  If you have any questions, ask your nurse or doctor.               Start taking these medicines.        Dose/Directions    zonisamide 25 MG capsule   Commonly known as:  Zonegran   Used for:  Episodic tension-type headache, not intractable   Started by:  Leroy Fortune MD        One at night x 1 week, then 2 at night x 1 week, then 3 at night   Quantity:  90 capsule   Refills:  1            Where to get your medicines      These medications were sent to [facility] [address] [place], [place] - [address] AT [facility] [address]  [address], [facility] [address]     Phone:  [phone]     zonisamide 25 MG capsule                Primary Care " Provider Office Phone # Fax #    Muriel Lilian Purcell [phone]       [facility] [address] [facility] [address]  [facility] [address]        Equal Access to Services     MARA CABRERA : Hadlakesha michael adan luis Socarlos, waaxda luqadaha, qaybta kaalmada adeegyada, jyotsna guevara kira feldman. So [facility] [phone].    ATENCIÓN: Si habla español, tiene a adame disposición servicios gratuitos de asistencia lingüística. Llame al [phone].    We comply with applicable federal civil rights laws and [place] laws. We do not discriminate on the basis of race, color, national origin, age, disability, sex, sexual orientation, or gender identity.            Thank you!     Thank you for choosing [facility]  for your care. Our goal is always to provide you with excellent care. Hearing back from our patients is one way we can continue to improve our services. Please take a few minutes to complete the written survey that you may receive in the mail after your visit with us. Thank you!             Your Updated Medication List - Protect others around you: Learn how to safely use, store and throw away your medicines at www.disposemymeds.org.          This list is accurate as of 10/24/18  9:20 AM.  Always use your most recent med list.                   Brand Name Dispense Instructions for use Diagnosis    CYTOMEL PO      Take 5 mcg by mouth 2 times daily        GLUCOPHAGE XR PO      Take 1,000 mg by mouth 2 times daily (with meals)        ibuprofen 800 MG tablet    ADVIL/MOTRIN          methylfolate 7.5 MG Tabs tablet    DEPLIN     Take 7.5 mg by mouth daily        MINIVELLE 0.1 MG/24HR BIW patch   Generic drug:  estradiol      Place 1 patch onto the skin twice a week        PARLODEL PO      Take 1.5 tablets by mouth At Bedtime        PRILOSEC PO      Take 40 mg by mouth every morning        progesterone 200 MG capsule    PROMETRIUM     Take 200 mg by mouth See Admin  Instructions 12 days per month        SYNTHROID 125 MCG tablet   Generic drug:  levothyroxine      Take 125 mcg by mouth daily        TIZANIDINE HCL PO      Take 2 mg by mouth daily as needed for muscle spasms        * VAGIFEM 10 MCG Tabs vaginal tablet   Generic drug:  estradiol      Place 10 mcg vaginally three times a week        * estradiol 0.1 MG/GM cream    ESTRACE     I 1 GRAM VAGINALLY 3 TIMES Q WK        zonisamide 25 MG capsule    Zonegran    90 capsule    One at night x 1 week, then 2 at night x 1 week, then 3 at night    Episodic tension-type headache, not intractable       * Notice:  This list has 2 medication(s) that are the same as other medications prescribed for you. Read the directions carefully, and ask your doctor or other care provider to review them with you.
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
## 2018-10-25 ENCOUNTER — MYC MEDICAL ADVICE (OUTPATIENT)
Dept: FAMILY MEDICINE | Facility: OTHER | Age: 48
End: 2018-10-25

## 2018-10-25 ENCOUNTER — RADIANT APPOINTMENT (OUTPATIENT)
Dept: MRI IMAGING | Facility: CLINIC | Age: 48
End: 2018-10-25
Attending: FAMILY MEDICINE
Payer: COMMERCIAL

## 2018-10-25 DIAGNOSIS — E22.1 HYPERPROLACTINEMIA (H): ICD-10-CM

## 2018-10-25 PROCEDURE — 70553 MRI BRAIN STEM W/O & W/DYE: CPT | Performed by: RADIOLOGY

## 2018-10-25 PROCEDURE — A9585 GADOBUTROL INJECTION: HCPCS | Performed by: FAMILY MEDICINE

## 2018-10-25 RX ORDER — GADOBUTROL 604.72 MG/ML
10 INJECTION INTRAVENOUS ONCE
Status: COMPLETED | OUTPATIENT
Start: 2018-10-25 | End: 2018-10-25

## 2018-10-25 RX ADMIN — GADOBUTROL 10 ML: 604.72 INJECTION INTRAVENOUS at 10:18

## 2018-10-25 NOTE — PROGRESS NOTES
Socorro, your results are nice and stable.  Please let me know if you have any questions.    Brittanie Elkins MD

## 2018-10-26 DIAGNOSIS — E55.9 VITAMIN D DEFICIENCY: ICD-10-CM

## 2018-10-26 PROCEDURE — 82306 VITAMIN D 25 HYDROXY: CPT | Performed by: FAMILY MEDICINE

## 2018-10-26 PROCEDURE — 36415 COLL VENOUS BLD VENIPUNCTURE: CPT | Performed by: FAMILY MEDICINE

## 2018-10-27 LAB — DEPRECATED CALCIDIOL+CALCIFEROL SERPL-MC: 24 UG/L (ref 20–75)

## 2018-10-29 ENCOUNTER — TRANSFERRED RECORDS (OUTPATIENT)
Dept: HEALTH INFORMATION MANAGEMENT | Facility: CLINIC | Age: 48
End: 2018-10-29

## 2018-10-29 NOTE — PROGRESS NOTES
Socorro, your vit D results were all normal.    Please let me know if you have any questions.    Brittanie Elkins MD

## 2018-11-09 NOTE — PROGRESS NOTES
SUBJECTIVE:   Socorro Etienne is a 48 year old female who presents to clinic today for the following health issues:      History of Present Illness     Hyperlipidemia:     Low fat/chol diet rating::  Not monitoring fat    Taking Statins::  No    Lipid Medications or Supplements::  Fish oil/Omega 3, without side effects.    Hypertension:     Outpatient blood pressures:  Are not being checked    Dietary sodium intake::  Not monitoring salt intake    Hypothyroidism:     Since last visit, patient describes the following symptoms::  Fatigue, Loose stools and Weight gain    Weight gain::  Less than 5 lbs.    Migraines:     Headache Symptoms are:  Stable    Migraine frequency::  3 per week    Migraine Duration::  1 hour    Ability to perform ADL's::  Yes    Migraine Rescue/Relief Medications::  None    Effectiveness of rescue/relief medications::  Intermittent relief    Migraine Preventative Medications::  Other    Neurological symptoms::  Loss of vision and Loss of speech    ER or UC Visits::  None    Diet:  Regular (no restrictions)  Frequency of exercise:  None  Taking medications regularly:  No  Barriers to taking medications:  None     Answers for HPI/ROS submitted by the patient on 11/15/2018   PHQ-2 Score: 0  If you checked off any problems, how difficult have these problems made it for you to do your work, take care of things at home, or get along with other people?: Not difficult at all  PHQ9 TOTAL SCORE: 4  CORAZON 7 TOTAL SCORE: 0    Mood  Socorro has a history of depression following a miscarriage, but has not had any symptoms related to depression since. The positive results on screenings are secondary to her other conditions.    Migraines  She takes Zonegran once every other night. With the Zonegran, she continues to have headaches 3x a week, but reports they have been shorter in duration and are manageable. Starting when she started Topamax, she has noticed been more forgetful and having impaired cognition.  The forgetfulness is not related to distraction, more like forgetting a step in a process and having minor tangled speech at times. She reports having increased bleeding with blood draws after starting the Zonegran. She had adverse reactions when taking Topamax, but she is unsure if they were caused by the Topamax or an underlying illness. She is considering switching back to Topamax due to weight loss benefit and current adverse effects of Zonegran.    Anemia  In recent blood tests she had an increased haptoglobin result, giving her concern about disease progression and increased risk for clotting.     Problem list and histories reviewed & adjusted, as indicated.  Additional history: as documented    Patient Active Problem List   Diagnosis     Infertility     Oligomenorrhea     Hyperprolactinemia (H)     Obesity     Hypothyroidism     PCOS (polycystic ovarian syndrome)     Pituitary microadenoma with hyperprolactinemia (H)     Female hirsutism     Hyperlipidemia LDL goal <130     History of depression     Abdominal pain, right lower quadrant     Family history of cervical cancer     Family history of colon cancer     Iron deficiency anemia due to chronic blood loss     Vitamin D deficiency     Ovarian cyst     Postoperative surgical complication involving both eyes     MTHFR gene mutation (H)     Hiatal hernia     Morbid obesity (H)     Past Surgical History:   Procedure Laterality Date     ABDOMEN SURGERY           BACK SURGERY  No surgeries    cervical/lumbar history of treatment     BREAST SURGERY       C  DELIVERY ONLY       DILATION AND CURETTAGE SUCTION  10/19/2012    Procedure: DILATION AND CURETTAGE SUCTION;  SUCTION D&C;  Surgeon: Muriel Purcell MD;  Location: [facility]     GENITOURINARY SURGERY       GI SURGERY      Gallbladder     HC BIOPSY OF BREAST, OPEN INCISIONAL  1999     HC COLONOSCOPY THRU STOMA, DIAGNOSTIC      normal     HC REMOVAL GALLBLADDER  10/2003      HC TOOTH EXTRACTION W/FORCEP  1999     LAPAROSCOPIC SALPINGECTOMY Left 4/6/2017    Procedure: LAPAROSCOPIC SALPINGECTOMY;  Surgeon: Muriel Purcell MD;  Location: [facility]     LAPAROSCOPIC SALPINGO-OOPHORECTOMY Right 4/6/2017    Procedure: LAPAROSCOPIC SALPINGO-OOPHORECTOMY;  Surgeon: Muriel Purcell MD;  Location: [facility]     LAPAROSCOPY DIAGNOSTIC (GYN)  10/25/2013    Procedure: LAPAROSCOPY DIAGNOSTIC (GYN);  DIAGNOSTIC LAPAROSCOPY;  Surgeon: Muriel Purcell MD;  Location: [facility]     ORTHOPEDIC SURGERY  no surgeries    BRUNA wrists, RT shoulder, BRUNA ankles/feet     SOFT TISSUE SURGERY         Social History   Substance Use Topics     Smoking status: Never Smoker     Smokeless tobacco: Never Used     Alcohol use No     Family History   Problem Relation Age of Onset     HEART DISEASE Mother      MI @ age 53, stented     Coronary Artery Disease Mother      Hypertension Mother      Depression Mother      Anxiety Disorder Mother      Mental Illness Mother      Thyroid Disease Mother      Obesity Mother      Diabetes Mother      Hyperlipidemia Mother      Parkinsonism Mother      Cardiovascular Father      Bypass in his late 50's     Diabetes Father      Cancer - colorectal Father      in his 50's, small bowel resection     Coronary Artery Disease Father      Cerebrovascular Disease Father      Colon Cancer Father      Hypertension Sister      Cervical Cancer Sister      Thyroid Disease Sister      Psychotic Disorder Sister      bipolar     Emphysema Sister      Lupus Sister      Psychotic Disorder Brother      poss schizophrenia     HEART DISEASE Brother      murmur     Coronary Artery Disease Maternal Grandfather      Coronary Artery Disease Paternal Grandmother      Osteoporosis Paternal Grandmother      Alzheimer Disease Paternal Grandmother      Hypertension Maternal Half-Sister      Anxiety Disorder Maternal Half-Sister      Substance Abuse Maternal Half-Sister      Mental Illness Maternal  Half-Sister      Asthma Maternal Half-Sister      Emphysema     Thyroid Disease Maternal Half-Sister      Obesity Maternal Half-Sister      Lupus Maternal Half-Sister      Emphysema Maternal Half-Sister      Cervical Cancer Maternal Half-Sister      Breast Cancer Other      Substance Abuse Other      Other Cancer Maternal Half-Sister      Anxiety Disorder Paternal Half-Brother      Mental Illness Maternal Grandmother      Alzheimer Disease Maternal Grandmother      Depression Maternal Grandmother      Asthma Son      Obesity Other      Multiple Sclerosis Other      Asthma Niece      Depression Niece      Hypertension Son      Hyperlipidemia Son      Depression Son      Anxiety Disorder Son      Asthma Son      Genetic Disorder Son      MTHFR     Thyroid Disease Son      Obesity Son      Heart Failure Maternal Uncle      Hypertrophic Obstructive Cardiomyopathy     Multiple Sclerosis Paternal Aunt      Colon Cancer Cousin      colon cancer w resection     Depression Nephew      Substance Abuse Other      Genetic Disorder Other      HOCM     Multiple Sclerosis Paternal Aunt      Unknown/Adopted No family hx of      I was adopted and do not have Yifan's history           ROS:  Constitutional, HEENT, cardiovascular, pulmonary, GI, , musculoskeletal, neuro, skin, endocrine and psych systems are negative, except as in HPI or otherwise noted.     This document serves as a record of the services and decisions personally performed and made by Brittanie Elkins MD. It was created on her behalf by Julio Cesar Kirkland, a trained medical scribe. The creation of this document is based the provider's statements to the medical scribe.  Julio Cesar Kirkland, November 15, 2018 8:43 AM    OBJECTIVE:                                                    /68  Pulse 94  Temp 97.6  F (36.4  C) (Temporal)  Resp 20  Wt 98.9 kg (218 lb)  LMP  (LMP Unknown)  BMI 41.19 kg/m2  Body mass index is 41.19 kg/(m^2).   GENERAL: healthy, alert, well nourished,  well hydrated, no distress, obese  SKIN: no suspicious lesions, no rashes to visible skin  PSYCH: Alert and oriented times 3; speech- coherent , normal rate and volume; able to articulate logical thoughts, able to abstract reason, no tangential thoughts, no hallucinations or delusions, affect- normal    Diagnostic test results:  No results found for this or any previous visit (from the past 24 hour(s)).     Brain/ Pituitary MRI without and with contrast     History: ; Hyperprolactinemia (H).  ICD-10: Hyperprolactinemia (H)     Comparison:  Pituitary MRI 4/2/2018      Technique: Axial diffusion and FLAIR images of the whole brain  obtained without intravenous contrast. Sagittal T1 and T2-weighted,  coronal T2-weighted, coronal T1-weighted images with focus on the  sella were obtained without intravenous contrast. Post intravenous  contrast using gadolinium coronal and sagittal T1-weighted images were  obtained focused on the sella. Dynamic postcontrast coronal  T1-weighted images were also obtained.     Contrast: 10ml Gadavist      Findings:    No significant change in 6 mm curvilinear hypoenhancing focus in the  inferior right aspect of the gland (series 20, image 5015). Unchanged  mild asymmetric convex superior margin of the right aspect of the  gland and minimal leftward deviation of the infundibulum. Optic  apparatus and cavernous sinuses are normal.     Stable single nonspecific punctate focus of T2 hyperintense signal in  the left superior frontal gyrus, which may represent sequelae migraine  headaches, small vessel ischemic disease, demyelination or prior  infection/inflammation. No intracranial hemorrhage, mass effect,  midline shift or abnormal extra axial fluid collection. Ventricles are  not enlarged. No abnormal foci of intracranial enhancement or  restricted diffusion. Right maxillary sinus polypoid mucosal  thickening. Trace right mastoid effusion. Normal marrow signal.         Impression: No  significant change since 4/2/2018. Stable right sellar  pituitary microadenoma.      MARTHA QUINTERO MD     ASSESSMENT/PLAN:                                                        ICD-10-CM    1. Iron deficiency anemia due to chronic blood loss D50.0 Haptoglobin     Blood Morphology Pathologist Review     CBC with platelets differential     Reticulocyte Count     CANCELED: CBC with platelets     Mood:  Patient has had no symptoms related to depression for a while. Any remaining results on recent screenings are secondary to other chronic conditions. No need to continue following mood at this time for anxiety. H/o depression, agreeable to phq screening    Migraines:  Patient is on medications to help with her migraines and continues to follow with Neurology for monitoring of condition.    Hypertension:  We will consider starting a blood pressure management medication pending results from blood tests to avoid possibly increasing clotting risk.    Maxillary Sinus Mucosal Thickening:  Reassured patient the findings of the MRI were benign and no action or intervention is necessary.     Hyperprolactinemia/Hypothyroidism:  Increased blood prolactin levels could contribute to increased clotting risk, continue monitoring hyperprolactinemia and hypothyroidism as planned to reduce risk.    MTHFR/Hyperlipidemia:  Discussed increased risk for stroke due to increased clotting from MTHFR gene mutation when homozygote, the risk is not statistically significant for heterozygotes which she is. But as yoko is looking at MRI And worried about the possible small vessel ischemic disease, we did discuss aspirin or eliquis to try to control risks. As she is being evaluated for haptoglobin and h/o iron def anemia, I would like to see this work up complete before we consider starting a blood thinner. Discussed aspirin vs Eliquis, and associated risks and costs.     Chronic Anemia:  Obtain haptoglobin, hemoglobin and blood smear to monitor  current condition before starting other medications, such as Eliquis or aspirin. Recommend repeat haptoglobin to confirm previous result, and hemoglobin and blood smear to aid in identifying the cause of elevated haptoglobin.    Weight loss/Focus:  Weight loss medication may further inhibit focus, so obtaining a focus work-up first is recommended. Focus work-up includes blood work mentioned above regarding anemia as well as migraine medication. After work-up is complete, we can address focus and subsequently weight loss.    Length of visit was 34 minutes with more than 50 percent of that time used for discussing medical concerns and education.    The information in this document, created by the medical scribe for me, accurately reflects the services I personally performed and the decisions made by me. I have reviewed and approved this document for accuracy.   MD Brittanie Dominguez MD, MD  [facility]

## 2018-11-14 ENCOUNTER — MYC MEDICAL ADVICE (OUTPATIENT)
Dept: FAMILY MEDICINE | Facility: OTHER | Age: 48
End: 2018-11-14

## 2018-11-15 ENCOUNTER — OFFICE VISIT (OUTPATIENT)
Dept: FAMILY MEDICINE | Facility: OTHER | Age: 48
End: 2018-11-15
Payer: COMMERCIAL

## 2018-11-15 VITALS
DIASTOLIC BLOOD PRESSURE: 68 MMHG | RESPIRATION RATE: 20 BRPM | SYSTOLIC BLOOD PRESSURE: 134 MMHG | TEMPERATURE: 97.6 F | BODY MASS INDEX: 41.19 KG/M2 | HEART RATE: 94 BPM | WEIGHT: 218 LBS

## 2018-11-15 DIAGNOSIS — D50.0 IRON DEFICIENCY ANEMIA DUE TO CHRONIC BLOOD LOSS: Primary | ICD-10-CM

## 2018-11-15 LAB
BASOPHILS # BLD AUTO: 0 10E9/L (ref 0–0.2)
BASOPHILS NFR BLD AUTO: 0.5 %
DIFFERENTIAL METHOD BLD: NORMAL
EOSINOPHIL # BLD AUTO: 0.2 10E9/L (ref 0–0.7)
EOSINOPHIL NFR BLD AUTO: 2.4 %
ERYTHROCYTE [DISTWIDTH] IN BLOOD BY AUTOMATED COUNT: 12.5 % (ref 10–15)
HCT VFR BLD AUTO: 43.5 % (ref 35–47)
HGB BLD-MCNC: 14.2 G/DL (ref 11.7–15.7)
LYMPHOCYTES # BLD AUTO: 2.2 10E9/L (ref 0.8–5.3)
LYMPHOCYTES NFR BLD AUTO: 34.5 %
MCH RBC QN AUTO: 30 PG (ref 26.5–33)
MCHC RBC AUTO-ENTMCNC: 32.6 G/DL (ref 31.5–36.5)
MCV RBC AUTO: 92 FL (ref 78–100)
MONOCYTES # BLD AUTO: 0.5 10E9/L (ref 0–1.3)
MONOCYTES NFR BLD AUTO: 7.8 %
NEUTROPHILS # BLD AUTO: 3.4 10E9/L (ref 1.6–8.3)
NEUTROPHILS NFR BLD AUTO: 54.8 %
PLATELET # BLD AUTO: 289 10E9/L (ref 150–450)
RBC # BLD AUTO: 4.73 10E12/L (ref 3.8–5.2)
RETICS # AUTO: 73.4 10E9/L (ref 25–95)
RETICS/RBC NFR AUTO: 1.5 % (ref 0.5–2)
WBC # BLD AUTO: 6.3 10E9/L (ref 4–11)

## 2018-11-15 PROCEDURE — 83010 ASSAY OF HAPTOGLOBIN QUANT: CPT | Performed by: FAMILY MEDICINE

## 2018-11-15 PROCEDURE — 85060 BLOOD SMEAR INTERPRETATION: CPT | Performed by: FAMILY MEDICINE

## 2018-11-15 PROCEDURE — 99214 OFFICE O/P EST MOD 30 MIN: CPT | Performed by: FAMILY MEDICINE

## 2018-11-15 PROCEDURE — 85025 COMPLETE CBC W/AUTO DIFF WBC: CPT | Performed by: FAMILY MEDICINE

## 2018-11-15 PROCEDURE — 85045 AUTOMATED RETICULOCYTE COUNT: CPT | Performed by: FAMILY MEDICINE

## 2018-11-15 PROCEDURE — 36415 COLL VENOUS BLD VENIPUNCTURE: CPT | Performed by: FAMILY MEDICINE

## 2018-11-15 ASSESSMENT — ANXIETY QUESTIONNAIRES
7. FEELING AFRAID AS IF SOMETHING AWFUL MIGHT HAPPEN: NOT AT ALL
GAD7 TOTAL SCORE: 0
7. FEELING AFRAID AS IF SOMETHING AWFUL MIGHT HAPPEN: NOT AT ALL
5. BEING SO RESTLESS THAT IT IS HARD TO SIT STILL: NOT AT ALL
6. BECOMING EASILY ANNOYED OR IRRITABLE: NOT AT ALL
2. NOT BEING ABLE TO STOP OR CONTROL WORRYING: NOT AT ALL
4. TROUBLE RELAXING: NOT AT ALL
3. WORRYING TOO MUCH ABOUT DIFFERENT THINGS: NOT AT ALL
1. FEELING NERVOUS, ANXIOUS, OR ON EDGE: NOT AT ALL
GAD7 TOTAL SCORE: 0
GAD7 TOTAL SCORE: 0

## 2018-11-15 ASSESSMENT — PATIENT HEALTH QUESTIONNAIRE - PHQ9
10. IF YOU CHECKED OFF ANY PROBLEMS, HOW DIFFICULT HAVE THESE PROBLEMS MADE IT FOR YOU TO DO YOUR WORK, TAKE CARE OF THINGS AT HOME, OR GET ALONG WITH OTHER PEOPLE: NOT DIFFICULT AT ALL
SUM OF ALL RESPONSES TO PHQ QUESTIONS 1-9: 4
SUM OF ALL RESPONSES TO PHQ QUESTIONS 1-9: 4

## 2018-11-15 ASSESSMENT — PAIN SCALES - GENERAL: PAINLEVEL: NO PAIN (0)

## 2018-11-15 NOTE — MR AVS SNAPSHOT
After Visit Summary   11/15/2018    Socorro Etienne    MRN: [id number]           Patient Information     Date Of Birth          1970        Visit Information        Provider Department      11/15/2018 8:30 AM Brittanie Elkins MD [facility]        Today's Diagnoses     Iron deficiency anemia due to chronic blood loss    -  1       Follow-ups after your visit        Follow-up notes from your care team     Return in about 4 weeks (around 12/13/2018) for follow-up.      Your next 10 appointments already scheduled     Nov 28, 2018  8:30 AM CST   Return Visit with Leroy Fortune MD   [facility] ([facility]    [address] [address] [place] [id number] [phone]              Who to contact     If you have questions or need follow up information about today's clinic visit or your schedule please contact [facility] directly at [phone].  Normal or non-critical lab and imaging results will be communicated to you by MyChart, letter or phone within 4 business days after the clinic has received the results. If you do not hear from us within 7 days, please contact the clinic through nvitehart or phone. If you have a critical or abnormal lab result, we will notify you by phone as soon as possible.  Submit refill requests through Flipaste or call your pharmacy and they will forward the refill request to us. Please allow 3 business days for your refill to be completed.          Additional Information About Your Visit        MyChart Information     Flipaste gives you secure access to your electronic health record. If you see a primary care provider, you can also send messages to your care team and make appointments. If you have questions, please call your primary care clinic.  If you do not have a primary care provider, please call [phone] and they will assist you.        Care EveryWhere ID     This is your Care EveryWhere ID.  This could be used by other organizations to access your [facility] medical records  [id number]        Your Vitals Were     Pulse Temperature Respirations Last Period BMI (Body Mass Index)       94 97.6  F (36.4  C) (Temporal) 20 (LMP Unknown) 41.19 kg/m2        Blood Pressure from Last 3 Encounters:   11/15/18 134/68   10/24/18 138/89   10/15/18 122/70    Weight from Last 3 Encounters:   11/15/18 218 lb (98.9 kg)   10/24/18 220 lb 4.8 oz (99.9 kg)   10/15/18 218 lb (98.9 kg)              We Performed the Following     Blood Morphology Pathologist Review     CBC with platelets differential     Haptoglobin     Reticulocyte Count          Today's Medication Changes          These changes are accurate as of 11/15/18  1:35 PM.  If you have any questions, ask your nurse or doctor.               Stop taking these medicines if you haven't already. Please contact your care team if you have questions.     ibuprofen 800 MG tablet   Commonly known as:  ADVIL/MOTRIN   Stopped by:  Brittanie Elkins MD           methylfolate 7.5 MG Tabs tablet   Commonly known as:  DEPLIN   Stopped by:  Brittanie Elkins MD                    Primary Care Provider Office Phone # Fax #    Muriel Quintana MD Jazzy [phone]       [facility] [address] [facility] [address]  [place] [address]        Equal Access to Services     [facility] AH: Hadii michael ku hadasho Soomaali, waaxda luqadaha, qaybta kaalmada radha, jyotsna feldman. So [facility] [phone].    ATENCIÓN: Si habla español, tiene a adame disposición servicios gratuitos de asistencia lingüística. Art al [phone].    We comply with applicable federal civil rights laws and [place] laws. We do not discriminate on the basis of race, color, national origin, age, disability, sex, sexual orientation, or gender identity.            Thank you!     Thank you for choosing [facility]  for your care. Our goal is always to  provide you with excellent care. Hearing back from our patients is one way we can continue to improve our services. Please take a few minutes to complete the written survey that you may receive in the mail after your visit with us. Thank you!             Your Updated Medication List - Protect others around you: Learn how to safely use, store and throw away your medicines at www.disposemymeds.org.          This list is accurate as of 11/15/18  1:35 PM.  Always use your most recent med list.                   Brand Name Dispense Instructions for use Diagnosis    CYTOMEL PO      Take 5 mcg by mouth 2 times daily        GLUCOPHAGE XR PO      Take 1,000 mg by mouth 2 times daily (with meals)        MINIVELLE 0.1 MG/24HR BIW patch   Generic drug:  estradiol      Place 1 patch onto the skin twice a week        PARLODEL PO      Take 1.5 tablets by mouth At Bedtime        PRILOSEC PO      Take 40 mg by mouth every morning        progesterone 200 MG capsule    PROMETRIUM     Take 200 mg by mouth See Admin Instructions 12 days per month        SYNTHROID 125 MCG tablet   Generic drug:  levothyroxine      Take 125 mcg by mouth daily        TIZANIDINE HCL PO      Take 2 mg by mouth daily as needed for muscle spasms        * VAGIFEM 10 MCG Tabs vaginal tablet   Generic drug:  estradiol      Place 10 mcg vaginally three times a week        * estradiol 0.1 MG/GM cream    ESTRACE     I 1 GRAM VAGINALLY 3 TIMES Q WK        zonisamide 25 MG capsule    Zonegran    90 capsule    One at night x 1 week, then 2 at night x 1 week, then 3 at night    Episodic tension-type headache, not intractable       * Notice:  This list has 2 medication(s) that are the same as other medications prescribed for you. Read the directions carefully, and ask your doctor or other care provider to review them with you.

## 2018-11-16 LAB
COPATH REPORT: NORMAL
HAPTOGLOB SERPL-MCNC: 217 MG/DL (ref 15–200)

## 2018-11-16 ASSESSMENT — ANXIETY QUESTIONNAIRES: GAD7 TOTAL SCORE: 0

## 2018-11-16 ASSESSMENT — PATIENT HEALTH QUESTIONNAIRE - PHQ9: SUM OF ALL RESPONSES TO PHQ QUESTIONS 1-9: 4

## 2018-11-18 NOTE — PROGRESS NOTES
Socorro, your results show normal smear and the percent of cells being regenerated is normal. The haptoglobin is normalizing as well. This may mean that there was an illness or event causing some issues, but that there is more normalization now.  Please let me know if you have any questions.    Brittanie Elkins MD

## 2018-11-22 ENCOUNTER — MYC MEDICAL ADVICE (OUTPATIENT)
Dept: FAMILY MEDICINE | Facility: OTHER | Age: 48
End: 2018-11-22

## 2018-11-22 DIAGNOSIS — I10 BENIGN ESSENTIAL HYPERTENSION: Primary | ICD-10-CM

## 2018-11-23 NOTE — TELEPHONE ENCOUNTER
Will route to provider for review of [employer] message.  Informed patient AE is out of office until Monday.  Marce Blanchard CMA (AAMA)

## 2018-11-26 RX ORDER — SPIRONOLACTONE 50 MG/1
50 TABLET, FILM COATED ORAL DAILY
Qty: 30 TABLET | Refills: 1 | Status: SHIPPED | OUTPATIENT
Start: 2018-11-26 | End: 2018-12-19

## 2018-11-28 ENCOUNTER — OFFICE VISIT (OUTPATIENT)
Dept: NEUROLOGY | Facility: CLINIC | Age: 48
End: 2018-11-28
Payer: COMMERCIAL

## 2018-11-28 VITALS
SYSTOLIC BLOOD PRESSURE: 130 MMHG | WEIGHT: 218.3 LBS | BODY MASS INDEX: 41.25 KG/M2 | DIASTOLIC BLOOD PRESSURE: 80 MMHG | HEART RATE: 98 BPM | OXYGEN SATURATION: 96 %

## 2018-11-28 DIAGNOSIS — G43.009 MIGRAINE WITHOUT AURA AND WITHOUT STATUS MIGRAINOSUS, NOT INTRACTABLE: Primary | ICD-10-CM

## 2018-11-28 PROCEDURE — 99213 OFFICE O/P EST LOW 20 MIN: CPT | Performed by: PSYCHIATRY & NEUROLOGY

## 2018-11-28 RX ORDER — PROPRANOLOL HCL 60 MG
60 CAPSULE, EXTENDED RELEASE 24HR ORAL DAILY
Qty: 30 CAPSULE | Refills: 1 | Status: SHIPPED | OUTPATIENT
Start: 2018-11-28 | End: 2018-12-19

## 2018-11-28 ASSESSMENT — PAIN SCALES - GENERAL: PAINLEVEL: MILD PAIN (3)

## 2018-11-28 NOTE — MR AVS SNAPSHOT
After Visit Summary   11/28/2018    Socorro Etienne    MRN: [id number]           Patient Information     Date Of Birth          1970        Visit Information        Provider Department      11/28/2018 8:30 AM Leroy Fortune MD [facility]        Today's Diagnoses     Migraine without aura and without status migrainosus, not intractable    -  1       Follow-ups after your visit        Follow-up notes from your care team     Discussed this visit Return in about 1 month (around 12/28/2018).      Your next 10 appointments already scheduled     Dec 19, 2018  3:30 PM CST   Return Visit with Leroy Fortune MD   [facility]    [id number] [place] [id number] [phone]              Who to contact     If you have questions or need follow up information about today's clinic visit or your schedule please contact [facility] directly at [phone].  Normal or non-critical lab and imaging results will be communicated to you by Desecuritrexhart, letter or phone within 4 business days after the clinic has received the results. If you do not hear from us within 7 days, please contact the clinic through PostBeyondt or phone. If you have a critical or abnormal lab result, we will notify you by phone as soon as possible.  Submit refill requests through eventuosity or call your pharmacy and they will forward the refill request to us. Please allow 3 business days for your refill to be completed.          Additional Information About Your Visit        Desecuritrexhart Information     eventuosity gives you secure access to your electronic health record. If you see a primary care provider, you can also send messages to your care team and make appointments. If you have questions, please call your primary care clinic.  If you do not have a primary care provider, please call [phone] and they will assist you.      eventuosity is an electronic  gateway that provides easy, online access to your medical records. With Axcient, you can request a clinic appointment, read your test results, renew a prescription or communicate with your care team.     To access your existing account, please contact your [facility] or call [phone] for assistance.        Care EveryWhere ID     This is your Care EveryWhere ID. This could be used by other organizations to access your [facility] medical records  [id number]        Your Vitals Were     Pulse Last Period Pulse Oximetry Breastfeeding? BMI (Body Mass Index)       98 (LMP Unknown) 96% No 41.25 kg/m2        Blood Pressure from Last 3 Encounters:   11/28/18 130/80   11/15/18 134/68   10/24/18 138/89    Weight from Last 3 Encounters:   11/28/18 99 kg (218 lb 4.8 oz)   11/15/18 98.9 kg (218 lb)   10/24/18 99.9 kg (220 lb 4.8 oz)              Today, you had the following     No orders found for display         Today's Medication Changes          These changes are accurate as of 11/28/18  8:57 AM.  If you have any questions, ask your nurse or doctor.               Start taking these medicines.        Dose/Directions    propranolol 60 MG 24 hr capsule   Commonly known as:  INDERAL LA   Used for:  Migraine without aura and without status migrainosus, not intractable   Started by:  Leroy Fortune MD        Dose:  60 mg   Take 1 capsule (60 mg) by mouth daily   Quantity:  30 capsule   Refills:  1         Stop taking these medicines if you haven't already. Please contact your care team if you have questions.     zonisamide 25 MG capsule   Commonly known as:  ZONEGRAN   Stopped by:  Leroy Fortune MD                Where to get your medicines      These medications were sent to [facility] [address] [place], [place] - [address] [address] [facility] [place] [facility] [address]  [address] [address], [facility] [address]     Phone:  [phone]     propranolol 60 MG 24 hr capsule                Primary  Care Provider Office Phone # Fax #    Muriel Lilian Purcell [phone]       [facility] [address] [facility] [address]  [facility] [place] [address]        Equal Access to Services     MARA CABRERA : Tello michael adan lamberto Sosantiagoali, waaxda luqadaha, qaybta kaalmada ademoiyada, jyotsna guevara kira feldman. So [facility] [phone].    ATENCIÓN: Si habla español, tiene a adame disposición servicios gratuitos de asistencia lingüística. Llame al [phone].    We comply with applicable federal civil rights laws and [place] laws. We do not discriminate on the basis of race, color, national origin, age, disability, sex, sexual orientation, or gender identity.            Thank you!     Thank you for choosing [facility]  for your care. Our goal is always to provide you with excellent care. Hearing back from our patients is one way we can continue to improve our services. Please take a few minutes to complete the written survey that you may receive in the mail after your visit with us. Thank you!             Your Updated Medication List - Protect others around you: Learn how to safely use, store and throw away your medicines at www.disposemymeds.org.          This list is accurate as of 11/28/18  8:57 AM.  Always use your most recent med list.                   Brand Name Dispense Instructions for use Diagnosis    CYTOMEL PO      Take 5 mcg by mouth 2 times daily        GLUCOPHAGE XR PO      Take 1,000 mg by mouth 2 times daily (with meals)        MINIVELLE 0.1 MG/24HR bi-weekly patch   Generic drug:  estradiol      Place 1 patch onto the skin twice a week        PARLODEL PO      Take 1.5 tablets by mouth At Bedtime        PRILOSEC PO      Take 40 mg by mouth every morning        progesterone 200 MG capsule    PROMETRIUM     Take 200 mg by mouth See Admin Instructions 12 days per month        propranolol 60 MG 24 hr capsule    INDERAL LA    30 capsule    Take 1 capsule (60 mg) by  mouth daily    Migraine without aura and without status migrainosus, not intractable       spironolactone 50 MG tablet    ALDACTONE    30 tablet    Take 1 tablet (50 mg) by mouth daily    Benign essential hypertension       SYNTHROID 125 MCG tablet   Generic drug:  levothyroxine      Take 125 mcg by mouth daily        TIZANIDINE HCL PO      Take 2 mg by mouth daily as needed for muscle spasms        * VAGIFEM 10 MCG Tabs vaginal tablet   Generic drug:  estradiol      Place 10 mcg vaginally three times a week        * estradiol 0.1 MG/GM vaginal cream    ESTRACE     I 1 GRAM VAGINALLY 3 TIMES Q WK        * Notice:  This list has 2 medication(s) that are the same as other medications prescribed for you. Read the directions carefully, and ask your doctor or other care provider to review them with you.

## 2018-11-28 NOTE — LETTER
11/28/2018         RE: Socorro Etienne  [address]  [address] [address]  [facility] [address]        Dear Colleague,    Thank you for referring your patient, Socorro Etienne, to the [facility]. Please see a copy of my visit note below.    Visit Date:   11/28/2018      HISTORY OF PRESENT ILLNESS:  Olya Etienne returns for followup.  She is a patient I saw on 10/24/2018 for episodic headaches that I thought at that time seemed predominantly tension type in character.  A previous trial of Topamax initially was helpful, but when she got to a dose of 100 mg a day she developed a number of side effects including worsening headaches, confusion and paresthesias.      Since I saw her, she did have a followup brain MRI scan and for purposes of monitoring a pituitary tumor.  The lesion in the pituitary appears stable.  A single small punctate focus of T2 hyperintensity is noted in the left superior frontal gyrus.  This is stable compared to a previous examination and, as I explained to her, it is a nonspecific finding.      She did start on zonisamide.  She developed a number of side effects including worsening of headaches and a change in cognition.  She stopped the drug.      She tells me now her headaches are mainly right-sided.  They are either dull, sharp or pounding in character.      PHYSICAL EXAMINATION:  Limited examination today reveals she is alert and cooperative.   VITAL SIGNS:  Heart rate 98, blood pressure 130/80.  Funduscopic examination reveals sharp disk margins.      IMPRESSION:  Episodic headaches -- possible migraine.      PLAN:  Her headaches that she describes now do seem migrainous in nature.      I discussed some other treatment options.  Weight gain is certainly a concern for her.  I discussed a trial of venlafaxine, but she tells me she developed a number of horrible side effects on Effexor in the past.      I previously discussed gabapentin, but there are some issues with weight gain.       I suggested a trial of propranolol XR 60 mg a day.  I reviewed potential side effects related to, in particular, hypotension and bradycardia.  She will be attentive to those.  She will start on propranolol XR 60 mg a day.      Depending on how she does, a retrial of a lower dose of Topamax might be considered in the future.      I plan to see her back in a month.         LEROY FORTUNE MD             D: 2018   T: 2018   MT: DINESH      Name:     MARIELLA FLORES   MRN:      [id number]        Account:      [id number]   :      1970           Visit Date:   2018      Document: [id number]        Again, thank you for allowing me to participate in the care of your patient.        Sincerely,        Leroy Fortune MD

## 2018-11-28 NOTE — NURSING NOTE
Socorroadelia Etienne's goals for this visit include: Return  She requests these members of her care team be copied on today's visit information:     PCP: Muriel Purcell    Referring Provider:  No referring provider defined for this encounter.    /80 (BP Location: Left arm, Patient Position: Sitting, Cuff Size: Adult Large)  Pulse 98  Wt 99 kg (218 lb 4.8 oz)  LMP  (LMP Unknown)  SpO2 96%  Breastfeeding? No  BMI 41.25 kg/m2    Do you need any medication refills at today's visit? No    Eun Lafleur CMA on 11/28/2018 at 8:32 AM

## 2018-11-28 NOTE — PROGRESS NOTES
Visit Date:   11/28/2018      HISTORY OF PRESENT ILLNESS:  Olya Etienne returns for followup.  She is a patient I saw on 10/24/2018 for episodic headaches that I thought at that time seemed predominantly tension type in character.  A previous trial of Topamax initially was helpful, but when she got to a dose of 100 mg a day she developed a number of side effects including worsening headaches, confusion and paresthesias.      Since I saw her, she did have a followup brain MRI scan and for purposes of monitoring a pituitary tumor.  The lesion in the pituitary appears stable.  A single small punctate focus of T2 hyperintensity is noted in the left superior frontal gyrus.  This is stable compared to a previous examination and, as I explained to her, it is a nonspecific finding.      She did start on zonisamide.  She developed a number of side effects including worsening of headaches and a change in cognition.  She stopped the drug.      She tells me now her headaches are mainly right-sided.  They are either dull, sharp or pounding in character.      PHYSICAL EXAMINATION:  Limited examination today reveals she is alert and cooperative.   VITAL SIGNS:  Heart rate 98, blood pressure 130/80.  Funduscopic examination reveals sharp disk margins.      IMPRESSION:  Episodic headaches -- possible migraine.      PLAN:  Her headaches that she describes now do seem migrainous in nature.      I discussed some other treatment options.  Weight gain is certainly a concern for her.  I discussed a trial of venlafaxine, but she tells me she developed a number of horrible side effects on Effexor in the past.      I previously discussed gabapentin, but there are some issues with weight gain.      I suggested a trial of propranolol XR 60 mg a day.  I reviewed potential side effects related to, in particular, hypotension and bradycardia.  She will be attentive to those.  She will start on propranolol XR 60 mg a day.      Depending on how  she does, a retrial of a lower dose of Topamax might be considered in the future.      I plan to see her back in a month.         SIMEON BOLTON MD             D: 2018   T: 2018   MT: DINESH      Name:     MARIELLA FLORES   MRN:      [id number]        Account:      [id number]   :      1970           Visit Date:   2018      Document: [id number]

## 2018-12-19 ENCOUNTER — OFFICE VISIT (OUTPATIENT)
Dept: NEUROLOGY | Facility: CLINIC | Age: 48
End: 2018-12-19
Payer: COMMERCIAL

## 2018-12-19 VITALS
OXYGEN SATURATION: 95 % | WEIGHT: 220.4 LBS | SYSTOLIC BLOOD PRESSURE: 120 MMHG | DIASTOLIC BLOOD PRESSURE: 76 MMHG | BODY MASS INDEX: 41.64 KG/M2 | HEART RATE: 90 BPM

## 2018-12-19 DIAGNOSIS — G43.009 MIGRAINE WITHOUT AURA AND WITHOUT STATUS MIGRAINOSUS, NOT INTRACTABLE: ICD-10-CM

## 2018-12-19 PROCEDURE — 99212 OFFICE O/P EST SF 10 MIN: CPT | Performed by: PSYCHIATRY & NEUROLOGY

## 2018-12-19 RX ORDER — PROPRANOLOL HCL 60 MG
60 CAPSULE, EXTENDED RELEASE 24HR ORAL DAILY
Qty: 90 CAPSULE | Refills: 1 | Status: SHIPPED | OUTPATIENT
Start: 2018-12-19 | End: 2019-06-24

## 2018-12-19 ASSESSMENT — PAIN SCALES - GENERAL: PAINLEVEL: NO PAIN (0)

## 2018-12-19 NOTE — NURSING NOTE
Socorro Etienne's goals for this visit include: return  She requests these members of her care team be copied on today's visit information:     PCP: Muriel Purcell    Referring Provider:  No referring provider defined for this encounter.    /76   Pulse 90   Wt 100 kg (220 lb 6.4 oz)   SpO2 95%   BMI 41.64 kg/m      Do you need any medication refills at today's visit? ?

## 2018-12-19 NOTE — LETTER
2018         RE: Socorro Etienne  [address]  [address] [address]  [facility] [address]        Dear Colleague,    Thank you for referring your patient, Socorro Etienne, to the [facility]. Please see a copy of my visit note below.    Visit Date:   2018      HISTORY OF PRESENT ILLNESS:  Socorro Etienne returns for followup of episodic headache, likely representing migraine.      When I saw her a few weeks ago, it was decided to try propranolol XR as weight gain was a concern of hers and we were hopeful that it would not provoke a significant gain in weight.  She had previously been on Topamax, which helped until she got to a dose of 100 mg a day and then she developed worsening headaches, confusion, and paresthesias.  She had a number of side effects on zonisamide.  She told me that venlafaxine in the past caused horrible side effects.      After starting propranolol XR 60 mg a day, she has been headache free.  She is very pleased about this.  She had a couple of brief episodes where she felt a little woozy but has not had any persistent symptoms suggesting a low blood pressure.  Her mood has been fine.  She may have gained a little bit of weight but given her good response to the propranolol, she wishes to continue it.      She is getting some physical therapy for her neck and shoulder and also continues on tizanidine at night.      Limited examination reveals her heart rate is 90, blood pressure 120/76.      IMPRESSION:  Migraine headaches -- improved.      PLAN:  She is going to continue on propranolol XR 60 mg a day.  The dose could be increased further in the future, depending on how she does.      I plan to see her back in 3 months.         SIMEON BOLTON MD             D: 2018   T: 2018   MT: AYLA      Name:     SOCORRO ETIENNE   MRN:      [id number]        Account:      [id number]   :      1970           Visit Date:   2018      Document: [id number]         Again, thank you for allowing me to participate in the care of your patient.        Sincerely,        Leroy Fortune MD

## 2018-12-20 NOTE — PROGRESS NOTES
Visit Date:   2018      HISTORY OF PRESENT ILLNESS:  Socorro Etienne returns for followup of episodic headache, likely representing migraine.      When I saw her a few weeks ago, it was decided to try propranolol XR as weight gain was a concern of hers and we were hopeful that it would not provoke a significant gain in weight.  She had previously been on Topamax, which helped until she got to a dose of 100 mg a day and then she developed worsening headaches, confusion, and paresthesias.  She had a number of side effects on zonisamide.  She told me that venlafaxine in the past caused horrible side effects.      After starting propranolol XR 60 mg a day, she has been headache free.  She is very pleased about this.  She had a couple of brief episodes where she felt a little woozy but has not had any persistent symptoms suggesting a low blood pressure.  Her mood has been fine.  She may have gained a little bit of weight but given her good response to the propranolol, she wishes to continue it.      She is getting some physical therapy for her neck and shoulder and also continues on tizanidine at night.      Limited examination reveals her heart rate is 90, blood pressure 120/76.      IMPRESSION:  Migraine headaches -- improved.      PLAN:  She is going to continue on propranolol XR 60 mg a day.  The dose could be increased further in the future, depending on how she does.      I plan to see her back in 3 months.         SIMEON BOLTON MD             D: 2018   T: 2018   MT: AYLA      Name:     SOCORRO ETIENNE   MRN:      [id number]        Account:      [id number]   :      1970           Visit Date:   2018      Document: [id number]

## 2019-02-19 ENCOUNTER — HOSPITAL ENCOUNTER (OUTPATIENT)
Dept: MAMMOGRAPHY | Facility: CLINIC | Age: 49
Discharge: HOME | End: 2019-02-19
Attending: SPECIALIST | Admitting: SPECIALIST
Payer: COMMERCIAL

## 2019-02-19 ENCOUNTER — HOSPITAL ENCOUNTER (OUTPATIENT)
Dept: MAMMOGRAPHY | Facility: CLINIC | Age: 49
End: 2019-02-19
Attending: SPECIALIST
Payer: COMMERCIAL

## 2019-02-19 DIAGNOSIS — N63.10 LUMP OF RIGHT BREAST: ICD-10-CM

## 2019-02-19 PROCEDURE — 77066 DX MAMMO INCL CAD BI: CPT

## 2019-02-19 PROCEDURE — 76642 ULTRASOUND BREAST LIMITED: CPT | Mod: RT

## 2019-02-19 PROCEDURE — G0279 TOMOSYNTHESIS, MAMMO: HCPCS

## 2019-03-14 ENCOUNTER — TRANSFERRED RECORDS (OUTPATIENT)
Dept: HEALTH INFORMATION MANAGEMENT | Facility: CLINIC | Age: 49
End: 2019-03-14

## 2019-03-14 LAB
HPV ABSTRACT: ABNORMAL
PAP SMEAR - HIM PATIENT REPORTED: NEGATIVE

## 2019-03-20 ENCOUNTER — OFFICE VISIT (OUTPATIENT)
Dept: NEUROLOGY | Facility: CLINIC | Age: 49
End: 2019-03-20
Payer: COMMERCIAL

## 2019-03-20 VITALS
SYSTOLIC BLOOD PRESSURE: 148 MMHG | HEART RATE: 94 BPM | WEIGHT: 226.2 LBS | OXYGEN SATURATION: 96 % | DIASTOLIC BLOOD PRESSURE: 83 MMHG | BODY MASS INDEX: 42.74 KG/M2

## 2019-03-20 DIAGNOSIS — G43.009 MIGRAINE WITHOUT AURA AND WITHOUT STATUS MIGRAINOSUS, NOT INTRACTABLE: Primary | ICD-10-CM

## 2019-03-20 PROCEDURE — 99213 OFFICE O/P EST LOW 20 MIN: CPT | Performed by: PSYCHIATRY & NEUROLOGY

## 2019-03-20 ASSESSMENT — PAIN SCALES - GENERAL: PAINLEVEL: NO PAIN (0)

## 2019-03-20 NOTE — LETTER
3/20/2019         RE: Socorro Etienne  [address]  [address] [address]  [facility] [address]        Dear Colleague,    Thank you for referring your patient, Socorro Etienne, to the [facility]. Please see a copy of my visit note below.    Visit Date:   03/20/2019      Patient returns for followup of episodic migraine without aura.      She continues to do well on propranolol XR 60 mg a day.  She has only had 2 headaches since I saw her in December.  Both of these were relieved with taking a nap and either Tylenol or Advil.      Unfortunately, she tells me she has continued to gain weight.  She is really not doing any exercise or following any strict diet.  We did discuss the possible propranolol could cause a change in her appetite or weight, but it is not as bad as some of the other medications we might use for migraine prophylaxis.      Again, it is notable that she had side effects on Topamax and zonisamide under my care and had  side effects on venlafaxine in the past as well.      The patient does have a known pituitary microadenoma, it is prolactin secreting it and also hypothyroidism.  She tells me she recently had thyroid functions and prolactin checked and these were normal.      PHYSICAL EXAMINATION:   VITAL SIGNS:  Heart rate is 94.  Blood pressure 148/83.   NEUROLOGIC:  Funduscopic examination reveals sharp disc margins.  Pupils are equal, round and react well to light.  Visual fields are intact.  Cranial nerves II-XII are intact.  Motor, sensory, cerebellar gait and reflex testing are normal.      IMPRESSION:  Episodic migraine.      PLAN:  She is comfortable continuing on propranolol XR 60 mg as it has really been helpful. We did discuss a diet and exercise to help with weight management.  I did point out to her that propranolol might affect her exercise tolerance.      For now, she is getting adequate relief of her headaches with over-the-counter analgesics but if they become more  problematic in the future, I might recommend a triptan agent.      I have asked her to followup with me in 6 months.         LEROY FORTUNE MD             D: 2019   T: 2019   MT: SAPPHIRE      Name:     MARIELLA FLORES   MRN:      [id number]        Account:      [id number]   :      1970           Visit Date:   2019      Document: [id number]        Again, thank you for allowing me to participate in the care of your patient.        Sincerely,        Leroy Fortune MD

## 2019-03-20 NOTE — NURSING NOTE
Socorroadelia Diazon's goals for this visit include: Return  She requests these members of her care team be copied on today's visit information: Yes    PCP: Muriel Purcell    Referring Provider:  No referring provider defined for this encounter.    /83 (BP Location: Left arm, Patient Position: Chair, Cuff Size: Adult Large)   Pulse 94   Wt 102.6 kg (226 lb 3.2 oz)   SpO2 96%   BMI 42.74 kg/m      Do you need any medication refills at today's visit? No    Eun Lafleur CMA on 3/20/2019 at 3:56 PM

## 2019-03-21 NOTE — PROGRESS NOTES
Visit Date:   03/20/2019      Patient returns for followup of episodic migraine without aura.      She continues to do well on propranolol XR 60 mg a day.  She has only had 2 headaches since I saw her in December.  Both of these were relieved with taking a nap and either Tylenol or Advil.      Unfortunately, she tells me she has continued to gain weight.  She is really not doing any exercise or following any strict diet.  We did discuss the possible propranolol could cause a change in her appetite or weight, but it is not as bad as some of the other medications we might use for migraine prophylaxis.      Again, it is notable that she had side effects on Topamax and zonisamide under my care and had  side effects on venlafaxine in the past as well.      The patient does have a known pituitary microadenoma, it is prolactin secreting it and also hypothyroidism.  She tells me she recently had thyroid functions and prolactin checked and these were normal.      PHYSICAL EXAMINATION:   VITAL SIGNS:  Heart rate is 94.  Blood pressure 148/83.   NEUROLOGIC:  Funduscopic examination reveals sharp disc margins.  Pupils are equal, round and react well to light.  Visual fields are intact.  Cranial nerves II-XII are intact.  Motor, sensory, cerebellar gait and reflex testing are normal.      IMPRESSION:  Episodic migraine.      PLAN:  She is comfortable continuing on propranolol XR 60 mg as it has really been helpful. We did discuss a diet and exercise to help with weight management.  I did point out to her that propranolol might affect her exercise tolerance.      For now, she is getting adequate relief of her headaches with over-the-counter analgesics but if they become more problematic in the future, I might recommend a triptan agent.      I have asked her to followup with me in 6 months.         SIMEON BOLTON MD             D: 03/20/2019   T: 03/21/2019   MT: SAPPHIRE      Name:     MARIELLA FLORES   MRN:      [id number]         Account:      [id number]   :      1970           Visit Date:   2019      Document: [id number]

## 2019-04-10 NOTE — PROGRESS NOTES
SUBJECTIVE:   Socorro Etienne is a 48 year old female who presents to clinic today for the following health issues:    History of Present Illness     Diabetes:     Frequency of checking blood sugars::  Not at all    Diabetic concerns::  Other    Hypoglycemia symptoms::  Shaky, Dizzy, Weak, Lethargy, Blurred vision and Confusion    Paraesthesia present::  YES    Eye Exam in the last year::  Yes    05/31/2018    Diabetes Management Resources    Hyperlipidemia:     Low fat/chol diet rating::  Not monitoring fat    Taking Statins::  YES    Lipid Medications or Supplements::  Fish oil/Omega 3, without side effects.    Hypertension:     Outpatient blood pressures:  Are not being checked    Dietary sodium intake::  Not monitoring salt intake    Diet:  Regular (no restrictions)  Frequency of exercise:  None  Taking medications regularly:  Yes  Additional concerns today:  No    Hemoglobin A1C   Date Value Ref Range Status   06/07/2018 5.6 0 - 5.6 % Final     Comment:     Normal <5.7% Prediabetes 5.7-6.4%  Diabetes 6.5% or higher - adopted from ADA   consensus guidelines.     03/06/2018 5.5 4.8 - 5.6 % Final   09/18/2017 5.4 4.0 - 6.0 % Final   02/01/2017 5.7 4.0 - 6.0 % Final     BP Readings from Last 3 Encounters:   04/18/19 132/82   03/20/19 148/83   12/19/18 120/76     Wt Readings from Last 4 Encounters:   04/18/19 101.2 kg (223 lb)   03/20/19 102.6 kg (226 lb 3.2 oz)   12/19/18 100 kg (220 lb 6.4 oz)   11/28/18 99 kg (218 lb 4.8 oz)     She has only had 3 headaches since starting propanolol, though she is frustrated because she has gained 6 pounds since starting medications. She reports she has not been doing any exercise, and has only been managing weight through light dieting. She complains today of having irritation and odor under the overhang of her stomach fat roll.     Menorrhagia  She had her period in January, but did not have one in February or March. In April, she had a very heavy period where she would have  to change her pad every couple hours. She has felt more fatigued and exhausted since the period so she is wondering about anemia and her Vitamin D labs. She has outside labs which show her ovarian hormone level is low.    Answers for HPI/ROS submitted by the patient on 2019   Chronic problems general questions HPI Form  If you checked off any problems, how difficult have these problems made it for you to do your work, take care of things at home, or get along with other people?: Not difficult at all  PHQ9 TOTAL SCORE: 0  CORAZON 7 TOTAL SCORE: 0    Additional history: as documented  Reviewed and updated as needed this visit by clinical staff  Reviewed and updated as needed this visit by Provider         Patient Active Problem List   Diagnosis     Infertility     Oligomenorrhea     Hyperprolactinemia (H)     Obesity     Hypothyroidism     PCOS (polycystic ovarian syndrome)     Pituitary microadenoma with hyperprolactinemia (H)     Female hirsutism     Hyperlipidemia LDL goal <130     History of depression     Abdominal pain, right lower quadrant     Family history of cervical cancer     Family history of colon cancer     Iron deficiency anemia due to chronic blood loss     Vitamin D deficiency     Ovarian cyst     Postoperative surgical complication involving both eyes     MTHFR gene mutation (H)     Hiatal hernia     Morbid obesity (H)     Past Surgical History:   Procedure Laterality Date     ABDOMEN SURGERY           BACK SURGERY  No surgeries    cervical/lumbar history of treatment     BREAST SURGERY       C  DELIVERY ONLY       DILATION AND CURETTAGE SUCTION  10/19/2012    Procedure: DILATION AND CURETTAGE SUCTION;  SUCTION D&C;  Surgeon: Muriel Purcell MD;  Location: [facility]     GENITOURINARY SURGERY       GI SURGERY      Gallbladder     HC BIOPSY OF BREAST, OPEN INCISIONAL  1999     HC COLONOSCOPY THRU STOMA, DIAGNOSTIC      normal     HC REMOVAL GALLBLADDER  10/2003      HC TOOTH EXTRACTION W/FORCEP  1999     LAPAROSCOPIC SALPINGECTOMY Left 4/6/2017    Procedure: LAPAROSCOPIC SALPINGECTOMY;  Surgeon: Muriel Purcell MD;  Location: [facility]     LAPAROSCOPIC SALPINGO-OOPHORECTOMY Right 4/6/2017    Procedure: LAPAROSCOPIC SALPINGO-OOPHORECTOMY;  Surgeon: Muriel Purcell MD;  Location: [facility]     LAPAROSCOPY DIAGNOSTIC (GYN)  10/25/2013    Procedure: LAPAROSCOPY DIAGNOSTIC (GYN);  DIAGNOSTIC LAPAROSCOPY;  Surgeon: Muriel Purcell MD;  Location: [facility]     ORTHOPEDIC SURGERY  no surgeries    BRUNA wrists, RT shoulder, BRUNA ankles/feet     SOFT TISSUE SURGERY         Social History     Tobacco Use     Smoking status: Never Smoker     Smokeless tobacco: Never Used   Substance Use Topics     Alcohol use: No     Family History   Problem Relation Age of Onset     Heart Disease Mother         MI @ age 53, stented     Coronary Artery Disease Mother      Hypertension Mother      Depression Mother      Anxiety Disorder Mother      Mental Illness Mother      Thyroid Disease Mother      Obesity Mother      Diabetes Mother      Hyperlipidemia Mother      Parkinsonism Mother      Cardiovascular Father         Bypass in his late 50's     Diabetes Father      Cancer - colorectal Father         in his 50's, small bowel resection     Coronary Artery Disease Father      Cerebrovascular Disease Father      Colon Cancer Father      Hypertension Sister      Cervical Cancer Sister      Thyroid Disease Sister      Psychotic Disorder Sister         bipolar     Emphysema Sister      Lupus Sister      Psychotic Disorder Brother         poss schizophrenia     Heart Disease Brother         murmur     Coronary Artery Disease Maternal Grandfather      Coronary Artery Disease Paternal Grandmother      Osteoporosis Paternal Grandmother      Alzheimer Disease Paternal Grandmother      Hypertension Maternal Half-Sister      Anxiety Disorder Maternal Half-Sister      Substance Abuse Maternal  Half-Sister      Mental Illness Maternal Half-Sister      Asthma Maternal Half-Sister         Emphysema     Thyroid Disease Maternal Half-Sister      Obesity Maternal Half-Sister      Lupus Maternal Half-Sister      Emphysema Maternal Half-Sister      Cervical Cancer Maternal Half-Sister      Breast Cancer Other      Substance Abuse Other      Other Cancer Maternal Half-Sister      Anxiety Disorder Paternal Half-Brother      Mental Illness Maternal Grandmother      Alzheimer Disease Maternal Grandmother      Depression Maternal Grandmother      Asthma Son      Obesity Other      Multiple Sclerosis Other      Asthma Niece      Depression Niece      Hypertension Son      Hyperlipidemia Son      Depression Son      Anxiety Disorder Son      Asthma Son      Genetic Disorder Son         MTHFR     Thyroid Disease Son      Obesity Son      Heart Failure Maternal Uncle         Hypertrophic Obstructive Cardiomyopathy     Multiple Sclerosis Paternal Aunt      Colon Cancer Cousin         colon cancer w resection     Depression Nephew      Substance Abuse Other      Genetic Disorder Other         HOCM     Multiple Sclerosis Paternal Aunt      Unknown/Adopted No family hx of         I was adopted and do not have Yifan's history           ROS:  Constitutional, HEENT, cardiovascular, pulmonary, GI, , musculoskeletal, neuro, skin, endocrine and psych systems are negative, except as in HPI or otherwise noted.     This document serves as a record of the services and decisions personally performed and made by Brittanie Elkins MD. It was created on her behalf by Julio Cesar Kirkland, a trained medical scribe. The creation of this document is based the provider's statements to the medical scribe.  Julio Cesar Kirkland, April 18, 2019 8:13 AM    OBJECTIVE:                                                    /82   Pulse 92   Temp 96.8  F (36  C) (Temporal)   Resp 16   Wt 101.2 kg (223 lb)   SpO2 96%   BMI 42.14 kg/m    Body mass index is 42.14  kg/m .   GENERAL: healthy, alert, well nourished, well hydrated, no distress, morbidly obese  RESP: lungs clear to auscultation - no rales, no rhonchi, no wheezes  CV: regular rates and rhythm, normal S1 S2, no S3 or S4 and no murmur, no click or rub -  SKIN: no suspicious lesions, no rashes to visible skin  PSYCH: Alert and oriented times 3; speech- coherent , normal rate and volume; able to articulate logical thoughts, able to abstract reason, no tangential thoughts, no hallucinations or delusions, affect- normal    Diagnostic test results:  No results found for this or any previous visit (from the past 24 hour(s)).     ASSESSMENT/PLAN:                                                        ICD-10-CM    1. Encounter for routine adult health examination without abnormal findings Z00.00    2. Need for prophylactic vaccination and inoculation against influenza Z23    3. Chronic fatigue R53.82 CBC with platelets     Iron and iron binding capacity     Ferritin     Vitamin B12     Vitamin B6     Hemoglobin A1c     Glucose   4. Hyperlipidemia LDL goal <160 E78.5 Lipid panel reflex to direct LDL Fasting     Morbid Obesity:  Discussed exercise recommendations to aid in weight loss and ways to measure effort besides heart rate due to her being on propanolol. She is interested in doing the [facility] program, I provided a note today to help her get approved via [employer] to start the program. Recommended against antibiotic ointments and instead using a regular barrier cream such as Vaseline.     Hyperlipidemia:  Her outside labs today are slightly improved from previous and she is not in the range necessitating treatment. Will obtain fasting lipid panel today.     Menorrhagia:  Reassured patient the irregular cycles are normal for deondre-menopause period. Will obtain a CBC today to ensure her heavy cycles are not causing anemia.     Pre-Diabetes:  Order placed for A1c and fasting glucose to be completed today.     MTHFR  carrier:      The information in this document, created by the medical scribe for me, accurately reflects the services I personally performed and the decisions made by me. I have reviewed and approved this document for accuracy.   MD Brittanie Dominguez MD, MD  [facility]

## 2019-04-17 ASSESSMENT — ANXIETY QUESTIONNAIRES
7. FEELING AFRAID AS IF SOMETHING AWFUL MIGHT HAPPEN: NOT AT ALL
GAD7 TOTAL SCORE: 0
4. TROUBLE RELAXING: NOT AT ALL
1. FEELING NERVOUS, ANXIOUS, OR ON EDGE: NOT AT ALL
6. BECOMING EASILY ANNOYED OR IRRITABLE: NOT AT ALL
GAD7 TOTAL SCORE: 0
7. FEELING AFRAID AS IF SOMETHING AWFUL MIGHT HAPPEN: NOT AT ALL
GAD7 TOTAL SCORE: 0
5. BEING SO RESTLESS THAT IT IS HARD TO SIT STILL: NOT AT ALL
3. WORRYING TOO MUCH ABOUT DIFFERENT THINGS: NOT AT ALL
2. NOT BEING ABLE TO STOP OR CONTROL WORRYING: NOT AT ALL

## 2019-04-17 ASSESSMENT — PATIENT HEALTH QUESTIONNAIRE - PHQ9
SUM OF ALL RESPONSES TO PHQ QUESTIONS 1-9: 0
SUM OF ALL RESPONSES TO PHQ QUESTIONS 1-9: 0
10. IF YOU CHECKED OFF ANY PROBLEMS, HOW DIFFICULT HAVE THESE PROBLEMS MADE IT FOR YOU TO DO YOUR WORK, TAKE CARE OF THINGS AT HOME, OR GET ALONG WITH OTHER PEOPLE: NOT DIFFICULT AT ALL

## 2019-04-18 ENCOUNTER — OFFICE VISIT (OUTPATIENT)
Dept: FAMILY MEDICINE | Facility: OTHER | Age: 49
End: 2019-04-18
Payer: COMMERCIAL

## 2019-04-18 VITALS
BODY MASS INDEX: 42.14 KG/M2 | DIASTOLIC BLOOD PRESSURE: 82 MMHG | WEIGHT: 223 LBS | TEMPERATURE: 96.8 F | HEART RATE: 92 BPM | OXYGEN SATURATION: 96 % | SYSTOLIC BLOOD PRESSURE: 132 MMHG | RESPIRATION RATE: 16 BRPM

## 2019-04-18 DIAGNOSIS — Z15.89 MTHFR GENE MUTATION: ICD-10-CM

## 2019-04-18 DIAGNOSIS — R53.82 CHRONIC FATIGUE: ICD-10-CM

## 2019-04-18 DIAGNOSIS — E53.1 VITAMIN B6 DEFICIENCY: ICD-10-CM

## 2019-04-18 DIAGNOSIS — E66.01 MORBID OBESITY (H): ICD-10-CM

## 2019-04-18 DIAGNOSIS — Z00.00 ENCOUNTER FOR ROUTINE ADULT HEALTH EXAMINATION WITHOUT ABNORMAL FINDINGS: Primary | ICD-10-CM

## 2019-04-18 DIAGNOSIS — D50.0 IRON DEFICIENCY ANEMIA DUE TO CHRONIC BLOOD LOSS: ICD-10-CM

## 2019-04-18 DIAGNOSIS — E78.5 HYPERLIPIDEMIA LDL GOAL <160: ICD-10-CM

## 2019-04-18 LAB
CHOLEST SERPL-MCNC: 189 MG/DL
ERYTHROCYTE [DISTWIDTH] IN BLOOD BY AUTOMATED COUNT: 12.8 % (ref 10–15)
FERRITIN SERPL-MCNC: 209 NG/ML (ref 8–252)
GLUCOSE SERPL-MCNC: 91 MG/DL (ref 70–99)
HBA1C MFR BLD: 5.6 % (ref 0–5.6)
HCT VFR BLD AUTO: 42.7 % (ref 35–47)
HDLC SERPL-MCNC: 36 MG/DL
HGB BLD-MCNC: 14.2 G/DL (ref 11.7–15.7)
IRON SATN MFR SERPL: 20 % (ref 15–46)
IRON SERPL-MCNC: 62 UG/DL (ref 35–180)
LDLC SERPL CALC-MCNC: 101 MG/DL
MCH RBC QN AUTO: 30 PG (ref 26.5–33)
MCHC RBC AUTO-ENTMCNC: 33.3 G/DL (ref 31.5–36.5)
MCV RBC AUTO: 90 FL (ref 78–100)
NONHDLC SERPL-MCNC: 153 MG/DL
PLATELET # BLD AUTO: 273 10E9/L (ref 150–450)
RBC # BLD AUTO: 4.74 10E12/L (ref 3.8–5.2)
TIBC SERPL-MCNC: 317 UG/DL (ref 240–430)
TRIGL SERPL-MCNC: 261 MG/DL
VIT B12 SERPL-MCNC: 296 PG/ML (ref 193–986)
WBC # BLD AUTO: 7.2 10E9/L (ref 4–11)

## 2019-04-18 PROCEDURE — 83540 ASSAY OF IRON: CPT | Performed by: FAMILY MEDICINE

## 2019-04-18 PROCEDURE — 36415 COLL VENOUS BLD VENIPUNCTURE: CPT | Performed by: FAMILY MEDICINE

## 2019-04-18 PROCEDURE — 83550 IRON BINDING TEST: CPT | Performed by: FAMILY MEDICINE

## 2019-04-18 PROCEDURE — 82607 VITAMIN B-12: CPT | Performed by: FAMILY MEDICINE

## 2019-04-18 PROCEDURE — 82728 ASSAY OF FERRITIN: CPT | Performed by: FAMILY MEDICINE

## 2019-04-18 PROCEDURE — 99214 OFFICE O/P EST MOD 30 MIN: CPT | Performed by: FAMILY MEDICINE

## 2019-04-18 PROCEDURE — 82947 ASSAY GLUCOSE BLOOD QUANT: CPT | Performed by: FAMILY MEDICINE

## 2019-04-18 PROCEDURE — 83036 HEMOGLOBIN GLYCOSYLATED A1C: CPT | Performed by: FAMILY MEDICINE

## 2019-04-18 PROCEDURE — 80061 LIPID PANEL: CPT | Performed by: FAMILY MEDICINE

## 2019-04-18 PROCEDURE — 99000 SPECIMEN HANDLING OFFICE-LAB: CPT | Performed by: FAMILY MEDICINE

## 2019-04-18 PROCEDURE — 84207 ASSAY OF VITAMIN B-6: CPT | Mod: 90 | Performed by: FAMILY MEDICINE

## 2019-04-18 PROCEDURE — 85027 COMPLETE CBC AUTOMATED: CPT | Performed by: FAMILY MEDICINE

## 2019-04-18 RX ORDER — CHLORAL HYDRATE 500 MG
2 CAPSULE ORAL DAILY
COMMUNITY

## 2019-04-18 RX ORDER — CYANOCOBALAMIN (VITAMIN B-12) 500 MCG
400 LOZENGE ORAL DAILY
COMMUNITY

## 2019-04-18 ASSESSMENT — ANXIETY QUESTIONNAIRES: GAD7 TOTAL SCORE: 0

## 2019-04-18 ASSESSMENT — PATIENT HEALTH QUESTIONNAIRE - PHQ9: SUM OF ALL RESPONSES TO PHQ QUESTIONS 1-9: 0

## 2019-04-18 NOTE — LETTER
2018         Brittanie Elkins MD   [facility]  [address] [place] [address]  Tel. [phone] Fax [phone]       Re: Socorro Etienne  : 1970      Dear To Whom It May Concern,    I provide primary care for the above named patient, Socorro Etienne. I treat her obesity, impaired fasting glucose ( 'pre-diabetes'), hypothyroidism, polycystic ovarian syndrome, and elevated blood pressure. She is on medications to treat her conditions but it is essential that she lose weight to manage her health. Weight loss would greatly benefit her overall health and I believe that it is medically necessary and essential for her to do so. I recommend that she be managed at [facility] for her weight loss. Please contact me by phone at the above number or by letter if you have any further questions or concerns.       Sincerely,              Brittanie Elkins MD

## 2019-04-18 NOTE — RESULT ENCOUNTER NOTE
Socorro, your results so far show improvement with the cholesterol and normal lab values otherwise.  Please let me know if you have any questions.    Brittanie Elkins MD

## 2019-04-21 LAB — VIT B6 SERPL-MCNC: 18.5 NMOL/L (ref 20–125)

## 2019-04-22 RX ORDER — LANOLIN ALCOHOL/MO/W.PET/CERES
50 CREAM (GRAM) TOPICAL DAILY
Qty: 90 TABLET | Refills: 1 | Status: SHIPPED | OUTPATIENT
Start: 2019-04-22 | End: 2020-01-22

## 2019-04-22 NOTE — RESULT ENCOUNTER NOTE
B6 came back just at little low. I did send a supplement if you would like to use this to replace. Otherwise dietary increases in B6 is usually best absorbed so can also be done.  Please let me know if you have any questions.    Brittanie Elkins MD

## 2019-06-24 DIAGNOSIS — G43.009 MIGRAINE WITHOUT AURA AND WITHOUT STATUS MIGRAINOSUS, NOT INTRACTABLE: ICD-10-CM

## 2019-06-24 RX ORDER — PROPRANOLOL HCL 60 MG
CAPSULE, EXTENDED RELEASE 24HR ORAL
Qty: 90 CAPSULE | Refills: 0 | Status: SHIPPED | OUTPATIENT
Start: 2019-06-24 | End: 2019-09-19

## 2019-06-24 NOTE — TELEPHONE ENCOUNTER
Last Written Prescription Date: 12/19/18  Last Fill Quantity: 90 tabs  Last office visit: 3/20/19  Future Office Visit: apt 9/25/19

## 2019-09-04 NOTE — PROGRESS NOTES
Subjective     Socorro Etienne is a 49 year old female who presents to clinic today for the following health issues:    HPI     Patient here to discuss recent labs from 19 from [facility].  Scanned in mychart encounter from 19.  Noted a long list of labs she completed with low phosphorus which can be secondary to her injectafer and that she had to increase the number of these due to the continued low ferritin, though at last check this is finally normal. Also with low Vit D and due for Thyroid testing soon. Also asks about prolactin which has traditionally been managed by her endocrinologist which hasn't seen her since 3/2018.  Has been having increase in fatigue and weight gain since the propranolol started, but has seen improved migraine control    -------------------------------------    Patient Active Problem List   Diagnosis     Infertility     Oligomenorrhea     Hyperprolactinemia (H)     Obesity     Hypothyroidism     PCOS (polycystic ovarian syndrome)     Pituitary microadenoma with hyperprolactinemia (H)     Female hirsutism     Hyperlipidemia LDL goal <130     History of depression     Abdominal pain, right lower quadrant     Family history of cervical cancer     Family history of colon cancer     Iron deficiency anemia due to chronic blood loss     Vitamin D deficiency     Ovarian cyst     Postoperative surgical complication involving both eyes     MTHFR gene mutation (H)     Hiatal hernia     Morbid obesity (H)     Past Surgical History:   Procedure Laterality Date     ABDOMEN SURGERY           BACK SURGERY  No surgeries    cervical/lumbar history of treatment     BREAST SURGERY       C  DELIVERY ONLY       DILATION AND CURETTAGE SUCTION  10/19/2012    Procedure: DILATION AND CURETTAGE SUCTION;  SUCTION D&C;  Surgeon: Muriel Purcell MD;  Location: [facility]     GENITOURINARY SURGERY       GI SURGERY      Gallbladder     HC BIOPSY OF BREAST, OPEN INCISIONAL  1999      HC COLONOSCOPY THRU STOMA, DIAGNOSTIC  2007    normal     HC REMOVAL GALLBLADDER  10/2003     HC TOOTH EXTRACTION W/FORCEP  1999     LAPAROSCOPIC SALPINGECTOMY Left 4/6/2017    Procedure: LAPAROSCOPIC SALPINGECTOMY;  Surgeon: Muriel Purclel MD;  Location: [facility]     LAPAROSCOPIC SALPINGO-OOPHORECTOMY Right 4/6/2017    Procedure: LAPAROSCOPIC SALPINGO-OOPHORECTOMY;  Surgeon: Muriel Purcell MD;  Location: [facility]     LAPAROSCOPY DIAGNOSTIC (GYN)  10/25/2013    Procedure: LAPAROSCOPY DIAGNOSTIC (GYN);  DIAGNOSTIC LAPAROSCOPY;  Surgeon: Muriel Purcell MD;  Location: [facility]     ORTHOPEDIC SURGERY  no surgeries    BRUNA wrists, RT shoulder, BRUNA ankles/feet     SOFT TISSUE SURGERY         Social History     Tobacco Use     Smoking status: Never Smoker     Smokeless tobacco: Never Used   Substance Use Topics     Alcohol use: No     Family History   Problem Relation Age of Onset     Heart Disease Mother         MI @ age 53, stented     Coronary Artery Disease Mother      Hypertension Mother      Depression Mother      Anxiety Disorder Mother      Mental Illness Mother      Thyroid Disease Mother      Obesity Mother      Diabetes Mother      Hyperlipidemia Mother      Parkinsonism Mother      Cardiovascular Father         Bypass in his late 50's     Diabetes Father      Cancer - colorectal Father         in his 50's, small bowel resection     Coronary Artery Disease Father      Cerebrovascular Disease Father      Colon Cancer Father      Hypertension Sister      Cervical Cancer Sister      Thyroid Disease Sister      Psychotic Disorder Sister         bipolar     Emphysema Sister      Lupus Sister      Psychotic Disorder Brother         poss schizophrenia     Heart Disease Brother         murmur     Coronary Artery Disease Maternal Grandfather      Coronary Artery Disease Paternal Grandmother      Osteoporosis Paternal Grandmother      Alzheimer Disease Paternal Grandmother      Hypertension  Maternal Half-Sister      Anxiety Disorder Maternal Half-Sister      Substance Abuse Maternal Half-Sister      Mental Illness Maternal Half-Sister      Asthma Maternal Half-Sister         Emphysema     Thyroid Disease Maternal Half-Sister      Obesity Maternal Half-Sister      Lupus Maternal Half-Sister      Emphysema Maternal Half-Sister      Cervical Cancer Maternal Half-Sister      Breast Cancer Other      Substance Abuse Other      Other Cancer Maternal Half-Sister      Anxiety Disorder Paternal Half-Brother      Mental Illness Maternal Grandmother      Alzheimer Disease Maternal Grandmother      Depression Maternal Grandmother      Asthma Son      Obesity Other      Multiple Sclerosis Other      Asthma Niece      Depression Niece      Hypertension Son      Hyperlipidemia Son      Depression Son      Anxiety Disorder Son      Asthma Son      Genetic Disorder Son         MTHFR     Thyroid Disease Son      Obesity Son      Heart Failure Maternal Uncle         Hypertrophic Obstructive Cardiomyopathy     Multiple Sclerosis Paternal Aunt      Pancreatic Cancer Paternal Aunt      Colon Cancer Cousin         colon cancer w resection     Depression Nephew      Substance Abuse Other      Genetic Disorder Other         HOCM     Multiple Sclerosis Paternal Aunt      Unknown/Adopted No family hx of         I was adopted and do not have Yifan's history           Reviewed and updated as needed this visit by Provider  Tobacco  Allergies  Meds  Problems  Med Hx  Surg Hx  Fam Hx         Review of Systems   Constitutional, HEENT, cardiovascular, pulmonary, GI, , musculoskeletal, neuro, skin, endocrine and psych systems are negative, except as in HPI or otherwise noted         Objective    /82 (BP Location: Left arm, Patient Position: Chair, Cuff Size: Adult Large)   Pulse 84   Temp 97.6  F (36.4  C) (Temporal)   Resp 16   Wt 106.1 kg (234 lb)   SpO2 96%   BMI 44.21 kg/m    Body mass index is 44.21  kg/m .  Physical Exam   GENERAL: healthy, alert and no distress  RESP: lungs clear to auscultation - no rales, rhonchi or wheezes  Neck: normal thyroid  CV: regular rate and rhythm, normal S1 S2, no S3 or S4, no murmur, click or rub, no peripheral edema and peripheral pulses strong  ABDOMEN: soft, nontender, no hepatosplenomegaly, no masses and bowel sounds normal  MS: no gross musculoskeletal defects noted, no edema  SKIN: no suspicious lesions or rashes  NEURO: Normal strength and tone, mentation intact and speech normal  PSYCH: mentation appears normal, affect normal/bright            Assessment & Plan       ICD-10-CM    1. Vitamin D deficiency disease E55.9 vitamin D2 (ERGOCALCIFEROL) 86078 units (1250 mcg) capsule     NUTRITION REFERRAL   2. Morbid obesity (H) E66.01 NUTRITION REFERRAL   3. Pituitary microadenoma with hyperprolactinemia (H) D35.2 Haptoglobin    E22.9 25 Hydroxyvitamin D2 and D3     Phosphorus     NUTRITION REFERRAL   4. Hypothyroidism, unspecified type E03.9 TSH with free T4 reflex     Discussed labs and needs vit d catch up and then is due for recheck on phos and TSH. She is also due for her microadenoma f/u and directed back to endocrine, though she specifically asks about haptoglobin as she would like to make sure we check that in case of delay for f/u with endocrine.  Also discussed weight gain - not likely direct effect from propranolol. But given the improvement of migraines, we usually can see improved functionality, so would like to see her get into an exercise program which she has not done previously and needs help and encouragement. Insurance would not cover her weight loss clinic, but given her nutritional concerns as well, may cover for nutrition visits, so referred there to see if she can get help with weight loss, but needs exercise as well.           Return in about 3 months (around 12/9/2019).    Brittanie Elkins MD, MD  [facility]

## 2019-09-09 ENCOUNTER — OFFICE VISIT (OUTPATIENT)
Dept: FAMILY MEDICINE | Facility: OTHER | Age: 49
End: 2019-09-09
Payer: COMMERCIAL

## 2019-09-09 VITALS
BODY MASS INDEX: 44.21 KG/M2 | TEMPERATURE: 97.6 F | WEIGHT: 234 LBS | HEART RATE: 84 BPM | RESPIRATION RATE: 16 BRPM | DIASTOLIC BLOOD PRESSURE: 82 MMHG | OXYGEN SATURATION: 96 % | SYSTOLIC BLOOD PRESSURE: 136 MMHG

## 2019-09-09 DIAGNOSIS — D35.2 PITUITARY MICROADENOMA WITH HYPERPROLACTINEMIA (H): ICD-10-CM

## 2019-09-09 DIAGNOSIS — E66.01 MORBID OBESITY (H): ICD-10-CM

## 2019-09-09 DIAGNOSIS — E55.9 VITAMIN D DEFICIENCY DISEASE: Primary | ICD-10-CM

## 2019-09-09 DIAGNOSIS — E03.9 HYPOTHYROIDISM, UNSPECIFIED TYPE: ICD-10-CM

## 2019-09-09 DIAGNOSIS — E22.9 PITUITARY MICROADENOMA WITH HYPERPROLACTINEMIA (H): ICD-10-CM

## 2019-09-09 PROCEDURE — 99214 OFFICE O/P EST MOD 30 MIN: CPT | Performed by: FAMILY MEDICINE

## 2019-09-09 RX ORDER — CHOLECALCIFEROL (VITAMIN D3) 50 MCG
1 TABLET ORAL DAILY
COMMUNITY
End: 2020-01-06

## 2019-09-09 RX ORDER — ERGOCALCIFEROL 1.25 MG/1
50000 CAPSULE, LIQUID FILLED ORAL WEEKLY
Qty: 1 CAPSULE | Refills: 0 | Status: SHIPPED | OUTPATIENT
Start: 2019-09-09 | End: 2019-09-11

## 2019-09-09 ASSESSMENT — PAIN SCALES - GENERAL: PAINLEVEL: NO PAIN (0)

## 2019-09-11 ENCOUNTER — TELEPHONE (OUTPATIENT)
Dept: FAMILY MEDICINE | Facility: OTHER | Age: 49
End: 2019-09-11

## 2019-09-11 ENCOUNTER — MYC MEDICAL ADVICE (OUTPATIENT)
Dept: FAMILY MEDICINE | Facility: OTHER | Age: 49
End: 2019-09-11

## 2019-09-11 DIAGNOSIS — E55.9 VITAMIN D DEFICIENCY DISEASE: ICD-10-CM

## 2019-09-11 RX ORDER — ERGOCALCIFEROL 1.25 MG/1
50000 CAPSULE, LIQUID FILLED ORAL WEEKLY
Qty: 8 CAPSULE | Refills: 0 | Status: SHIPPED | OUTPATIENT
Start: 2019-09-11 | End: 2020-01-06

## 2019-09-11 NOTE — TELEPHONE ENCOUNTER
Reason for Call:  Other call back    Detailed comments: pt calling, was prescribed Vit D but only given 1 pill. She is wondering what to do from here. She doesn't have a phone number, please reply via [place].     Phone Number Patient can be reached at: no phone, - [place] please     Best Time: any     Can we leave a detailed message on this number? Not Applicable    Call taken on 9/11/2019 at 11:46 AM by Vaishnavi Villar

## 2019-09-11 NOTE — TELEPHONE ENCOUNTER
Was ordered in correctly, updated. Should have 8 tabs and take 1 weekly for 8 weeks.  Brittanie Elkins MD

## 2019-09-13 ENCOUNTER — AMBULATORY - HEALTHEAST (OUTPATIENT)
Dept: OTHER | Facility: CLINIC | Age: 49
End: 2019-09-13

## 2019-09-13 ENCOUNTER — DOCUMENTATION ONLY (OUTPATIENT)
Dept: OTHER | Facility: CLINIC | Age: 49
End: 2019-09-13

## 2019-09-19 DIAGNOSIS — G43.009 MIGRAINE WITHOUT AURA AND WITHOUT STATUS MIGRAINOSUS, NOT INTRACTABLE: ICD-10-CM

## 2019-09-19 RX ORDER — PROPRANOLOL HCL 60 MG
CAPSULE, EXTENDED RELEASE 24HR ORAL
Qty: 90 CAPSULE | Refills: 0 | Status: SHIPPED | OUTPATIENT
Start: 2019-09-19 | End: 2019-09-25

## 2019-09-19 NOTE — TELEPHONE ENCOUNTER
Rx Authorization:    Requested Medication/ Dose PROPANOLOL  CAPSULE    Date last refill ordered: 6/24/2019    Quantity ordered: 90    # refills: 0    Date of last clinic visit with ordering provider: 3/20/2019    Date of next clinic visit with ordering provider: 9/25/2019    All pertinent protocol data (lab date/result):     Include pertinent information from patients message:

## 2019-09-22 ENCOUNTER — MYC MEDICAL ADVICE (OUTPATIENT)
Dept: FAMILY MEDICINE | Facility: OTHER | Age: 49
End: 2019-09-22

## 2019-09-25 ENCOUNTER — OFFICE VISIT (OUTPATIENT)
Dept: NEUROLOGY | Facility: CLINIC | Age: 49
End: 2019-09-25
Payer: COMMERCIAL

## 2019-09-25 VITALS
BODY MASS INDEX: 44.27 KG/M2 | WEIGHT: 234.5 LBS | HEIGHT: 61 IN | SYSTOLIC BLOOD PRESSURE: 123 MMHG | DIASTOLIC BLOOD PRESSURE: 75 MMHG | OXYGEN SATURATION: 97 % | HEART RATE: 104 BPM

## 2019-09-25 DIAGNOSIS — G43.009 MIGRAINE WITHOUT AURA AND WITHOUT STATUS MIGRAINOSUS, NOT INTRACTABLE: Primary | ICD-10-CM

## 2019-09-25 PROCEDURE — 99213 OFFICE O/P EST LOW 20 MIN: CPT | Performed by: PSYCHIATRY & NEUROLOGY

## 2019-09-25 RX ORDER — PROPRANOLOL HCL 60 MG
60 CAPSULE, EXTENDED RELEASE 24HR ORAL DAILY
Qty: 90 CAPSULE | Refills: 3 | Status: SHIPPED | OUTPATIENT
Start: 2019-09-25 | End: 2020-06-25

## 2019-09-25 RX ORDER — NAPROXEN 500 MG/1
TABLET ORAL
Qty: 15 TABLET | Refills: 1 | Status: SHIPPED | OUTPATIENT
Start: 2019-09-25 | End: 2020-06-25

## 2019-09-25 ASSESSMENT — MIFFLIN-ST. JEOR: SCORE: 1631.07

## 2019-09-25 ASSESSMENT — PAIN SCALES - GENERAL: PAINLEVEL: NO PAIN (0)

## 2019-09-25 NOTE — NURSING NOTE
"Socorro Etienne's goals for this visit include: return  She requests these members of her care team be copied on today's visit information:     PCP: Muriel Purcell    Referring Provider:  No referring provider defined for this encounter.    /75   Pulse 104   Ht 1.549 m (5' 1\")   Wt 106.4 kg (234 lb 8 oz)   SpO2 97%   BMI 44.31 kg/m      Do you need any medication refills at today's visit? y  "

## 2019-09-25 NOTE — LETTER
9/25/2019         RE: Socorro Etienne  [id number]  [address] [id number]  [facility] [address]        Dear Colleague,    Thank you for referring your patient, Socorro Etienne, to the [facility]. Please see a copy of my visit note below.    Visit Date:   09/25/2019      Socorro Etienne returns for followup of migraine without aura.      She continues to do very well in terms of headache control with propranolol XR 60 mg a day.  She can recall only 3 headaches that she has had since I saw her in March.  Two of these were relieved with ibuprofen, but one was not.      Her major issue with the propranolol has been its association, in her case, with weight gain.  I should note in terms of preventive drugs for migraine that might not cause weight gain, such as Topamax, zonisamide and venlafaxine, she has had adverse side effects.  Other preventive medication such as tricyclics and Depakote have a higher incidence of weight gain. I did review this literature with her.  Typically, from what we can find, the weight gain tends to be an early manifestation of treatment with beta blockers and weight seems to stabilize.      PHYSICAL EXAMINATION:   VITAL SIGNS:  Weight is 234 pounds.  Heart rate 104.  Blood pressure 123/75.   NEUROLOGIC:  Funduscopic examination reveals sharp disc margins.  Visual fields are intact.  Cranial nerves II-XII are intact.  Motor, sensory, cerebellar and gait testing are normal.  Reflexes:  2+.  Plantar responses are flexor.      IMPRESSION:  Migraine without aura.      PLAN:  I discussed reducing her propranolol dose, but she was somewhat fearful that it might lead to an exacerbation of her headaches.  She asked me about taking the propranolol XR every other day, but I was a bit concerned about potential for reflex tachycardia or a rise in her blood pressure as she does have a history of hypertension.  Ultimately, she was comfortable continuing with her current dose of propranolol.      In  terms of acute headache management, I did discuss a triptan medication with her.  Alternatively, I also discussed naproxen.  She would be more interested in trying naproxen and I gave her a prescription for a 500 mg naproxen to use as needed for acute migraine headache management.      I will be seeing her back in 9 months.         LEROY FORTUNE MD             D: 2019   T: 2019   MT: WT      Name:     MARIELLA FLORES   MRN:      [id number]        Account:      [id number]   :      1970           Visit Date:   2019      Document: [id number]        Again, thank you for allowing me to participate in the care of your patient.        Sincerely,        Leroy Fortune MD

## 2019-09-26 NOTE — PROGRESS NOTES
Visit Date:   09/25/2019      Socorro tEienne returns for followup of migraine without aura.      She continues to do very well in terms of headache control with propranolol XR 60 mg a day.  She can recall only 3 headaches that she has had since I saw her in March.  Two of these were relieved with ibuprofen, but one was not.      Her major issue with the propranolol has been its association, in her case, with weight gain.  I should note in terms of preventive drugs for migraine that might not cause weight gain, such as Topamax, zonisamide and venlafaxine, she has had adverse side effects.  Other preventive medication such as tricyclics and Depakote have a higher incidence of weight gain. I did review this literature with her.  Typically, from what we can find, the weight gain tends to be an early manifestation of treatment with beta blockers and weight seems to stabilize.      PHYSICAL EXAMINATION:   VITAL SIGNS:  Weight is 234 pounds.  Heart rate 104.  Blood pressure 123/75.   NEUROLOGIC:  Funduscopic examination reveals sharp disc margins.  Visual fields are intact.  Cranial nerves II-XII are intact.  Motor, sensory, cerebellar and gait testing are normal.  Reflexes:  2+.  Plantar responses are flexor.      IMPRESSION:  Migraine without aura.      PLAN:  I discussed reducing her propranolol dose, but she was somewhat fearful that it might lead to an exacerbation of her headaches.  She asked me about taking the propranolol XR every other day, but I was a bit concerned about potential for reflex tachycardia or a rise in her blood pressure as she does have a history of hypertension.  Ultimately, she was comfortable continuing with her current dose of propranolol.      In terms of acute headache management, I did discuss a triptan medication with her.  Alternatively, I also discussed naproxen.  She would be more interested in trying naproxen and I gave her a prescription for a 500 mg naproxen to use as needed for  acute migraine headache management.      I will be seeing her back in 9 months.         SIMEON BOLTON MD             D: 2019   T: 2019   MT: WT      Name:     MARIELLA FLORES   MRN:      [id number]        Account:      [id number]   :      1970           Visit Date:   2019      Document: [id number]

## 2019-09-30 ENCOUNTER — MYC MEDICAL ADVICE (OUTPATIENT)
Dept: FAMILY MEDICINE | Facility: OTHER | Age: 49
End: 2019-09-30

## 2019-09-30 DIAGNOSIS — D35.2 PITUITARY MICROADENOMA WITH HYPERPROLACTINEMIA (H): Primary | ICD-10-CM

## 2019-09-30 DIAGNOSIS — H53.452 DECREASED PERIPHERAL VISION OF LEFT EYE: ICD-10-CM

## 2019-09-30 DIAGNOSIS — E22.9 PITUITARY MICROADENOMA WITH HYPERPROLACTINEMIA (H): Primary | ICD-10-CM

## 2019-10-01 ENCOUNTER — TELEPHONE (OUTPATIENT)
Dept: OPHTHALMOLOGY | Facility: CLINIC | Age: 49
End: 2019-10-01

## 2019-10-01 NOTE — TELEPHONE ENCOUNTER
Spoke to pt at 1630  Pt went to [facility] and did not pass peripheral vision test  H/o microadenoma change from 3 mm to 6 mm per pt-- last MRI in October 208  All MRI's at [facility] per pt    Pt had eye exam last week and no objectable findings per pt    Pt having peripheral vision test this Friday    Scheduled first available with Dr. Zarate October 17th at 0730  Pt aware of date/time/location and will fax eye notes/visual fields to clinic once available    Note to facilitator  Juan R Foote RN RN 4:48 PM 10/02/19          Line busy this AM  Spoke to mother-- emergency contact and provided direct number for pt to call-- mother states will contact pt to call back  Juan R Foote RN RN 10:38 AM 10/02/19    ---      Line busy at 1700 and 1730    Cancelled tomorrow's appt as was unable to reach pt    [phone] (no cell/work number)  Juan R Foote RN RN 5:30 PM 10/01/19          Socorro Etienne [phone]  Line busy times 3 this afternoon    Dr. Metcalf able to see tomorrow AM  Juan R Foote RN RN 2:56 PM 10/01/19           [facility]    Phone Message    May a detailed message be left on voicemail: yes    Reason for Call: Other: Patient referred for Pituitary microadenoma with hyperprolactinemia (H) [D35.2, E22.9];Decreased peripheral vision of left eye [H53.452]     Action Taken: Message routed to:  [facility] ([facility] Ophth

## 2019-10-01 NOTE — TELEPHONE ENCOUNTER
Patient has peripheral vision loss in the setting of a pituitary microadenoma. Normally this would f/u with ophthal for evaluation and she may be [facility] given [facility]. Please make sure referral is correct and then send her scheduling information.  Thanks.  Brittanie Elkins MD

## 2019-10-02 ENCOUNTER — MYC MEDICAL ADVICE (OUTPATIENT)
Dept: FAMILY MEDICINE | Facility: OTHER | Age: 49
End: 2019-10-02

## 2019-10-02 NOTE — TELEPHONE ENCOUNTER
Patient has responded and claims not to have contact info for scheduling neuro ophthal.  Please call her with this information as we requested in my first note.  Brittanie Elkins MD

## 2019-10-02 NOTE — TELEPHONE ENCOUNTER
Sent my chart message with contact info for [facility], they are to be contacting her as well to schedule.

## 2019-10-07 ENCOUNTER — MYC MEDICAL ADVICE (OUTPATIENT)
Dept: OPHTHALMOLOGY | Facility: CLINIC | Age: 49
End: 2019-10-07

## 2019-10-17 DIAGNOSIS — H53.10 SUBJECTIVE VISUAL DISTURBANCE: Primary | ICD-10-CM

## 2019-10-31 ENCOUNTER — OFFICE VISIT (OUTPATIENT)
Dept: OPHTHALMOLOGY | Facility: CLINIC | Age: 49
End: 2019-10-31
Attending: FAMILY MEDICINE
Payer: COMMERCIAL

## 2019-10-31 DIAGNOSIS — D35.2 PITUITARY MICROADENOMA (H): ICD-10-CM

## 2019-10-31 DIAGNOSIS — H53.40 VISUAL FIELD DEFECT: Primary | ICD-10-CM

## 2019-10-31 DIAGNOSIS — H53.10 SUBJECTIVE VISUAL DISTURBANCE: ICD-10-CM

## 2019-10-31 PROCEDURE — 92133 CPTRZD OPH DX IMG PST SGM ON: CPT | Mod: ZF | Performed by: OPHTHALMOLOGY

## 2019-10-31 PROCEDURE — 92083 EXTENDED VISUAL FIELD XM: CPT | Mod: ZF | Performed by: OPHTHALMOLOGY

## 2019-10-31 PROCEDURE — G0463 HOSPITAL OUTPT CLINIC VISIT: HCPCS | Mod: ZF | Performed by: TECHNICIAN/TECHNOLOGIST

## 2019-10-31 ASSESSMENT — EXTERNAL EXAM - RIGHT EYE: OD_EXAM: NORMAL

## 2019-10-31 ASSESSMENT — VISUAL ACUITY
OS_SC: 20/50
METHOD: SNELLEN - LINEAR
OD_SC+: -2
OD_PH_SC+: -2
OD_PH_SC: 20/20
OS_PH_SC: 20/30
OS_SC+: -2
OD_SC: 20/40
OS_PH_SC+: -2

## 2019-10-31 ASSESSMENT — EXTERNAL EXAM - LEFT EYE: OS_EXAM: NORMAL

## 2019-10-31 ASSESSMENT — CONF VISUAL FIELD
OS_NORMAL: 1
OD_NORMAL: 1
METHOD: COUNTING FINGERS

## 2019-10-31 ASSESSMENT — SLIT LAMP EXAM - LIDS
COMMENTS: NORMAL
COMMENTS: NORMAL

## 2019-10-31 ASSESSMENT — CUP TO DISC RATIO
OS_RATIO: 0.25
OD_RATIO: 0.25

## 2019-10-31 ASSESSMENT — TONOMETRY
OS_IOP_MMHG: 23
IOP_METHOD: ICARE
OD_IOP_MMHG: 24

## 2019-10-31 NOTE — LETTER
10/31/2019         RE:  :  MRN: Socorro Etienne  1970  [id number]     Dear Dr. Brittanie Elkins,    Thank you for asking me to see your very pleasant patient, Socorro Etienne, in neuro-ophthalmic consultation.  I would like to thank you for sending your records and I have summarized them in the history of present illness. My assessment and plan are below.  For further details, please see my attached clinic note.      Assessment & Plan     Socorro Etienne is a 49 year old female with the following diagnoses:   1. Visual field defect    2. Subjective visual disturbance    3. Pituitary microadenoma (H)       The patient is a 49-year old female who presents for consultation from Dr Elkins for evaluation of subjective visual field disturbance. She reports being diagnosed with a 3 mm functional pituitary adenoma in  with symptoms related to hyperprolactinemia. She had resolution of symptoms with bromocriptine and continued on therapy until around 4533-4197. In 2018, she began having symptoms of hirsutism, amenorrhea, headaches, breast pain. MRI brain was completed and showed a 6 mm prolactinoma. She restarted bromocriptine and her symptoms improved somewhat. Her history is complicated in that she has had ovarian failure and PCOS. She also developed occipital headaches in 2018. She sees Dr Watson (neurologist) for headaches and is currently on propranolol. Her first visual symptoms were diplopia. She does not have diplopia when wearing her contact lenses. No eye pain or pain with motility. She does endorse dry eye and tearing left eye > right eye. Her vision is otherwise stable when wearing corrective lenses. She does not notice a visual field cut. She has not had new flashes or floaters.    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 Began wearing glasses age 7  Past medical history: prolactinoma, HTN, PCOS, hypothyroid, T2DM  Medications: Synthroid, Cytomel, estrogen,  "vitamin E, fish oil, vitamin D, bromocriptine, propranolol  Family history: no known eye disease     Imaging: MRI brain 10/2018 showed a 6 mm \"stable\" right sellar pituitary microadenoma    Labs: Prolactin 9/30/19 was 15.1    Visual acuity was 20/20 right eye, 20/30 left eye. Pupil exam normal w/o an APD. IOP was borderline elevated each eye. Color plates were full each eye. Confrontational visual fields were full each eye. Strabismus exam was normal. Anterior slit lamp exam was normal each eye. Dilated funduscopic exam showed normal optic nerves and macula each eye.     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 OCT RNFL was normal right eye and left eye. OCT macula was normal each eye.    Is my impression that the patient is complaining of blurred vision.  She has unreliable visual fields from 2018.  Her most recent visual field from today shows a normal result in the right eye and a paracentral defect in the left eye.  I personally reviewed her MRI from October 2018 and it shows the pituitary lesion is not compressing the visual pathway.  I will order repeat MRI to make sure has not significantly increased in size.  If the pituitary gland is pressing on the chiasm, then this visual field defect is real and indicative of her symptoms.  If the gland is not touching the chiasm then most likely the visual field defect is artifact, and I would recommend follow up in 1 year.       Again, thank you for allowing me to participate in the care of your patient.      Sincerely,    Crow Zarate MD  Professor  Ophthalmology Residency   Director of Neuro-Ophthalmology  [employer] [facility] [employer] Endowed Chair  Departments of Ophthalmology, Neurology, and Neurosurgery  [facility] [address] [place]  [address]  T - [phone]  F - [phone]  [email or web address]    CC:   Brittanie Elkins MD  [address] [place] [address]" Nw  [facility] [address]  VIA In Basket     Muriel Purcell MD  [facility]  [address]  [place] [address]  VIA Facsimile: [phone]     YOSEPH Mak MD  [facility] [place] [address]   [address]  [place] [address]  VIA Facsimile: [phone]     Socorro Etienne  VIA [place]       DX = ***

## 2019-10-31 NOTE — PROGRESS NOTES
"       Assessment & Plan     Socorro Etienne is a 49 year old female with the following diagnoses:   1. Visual field defect    2. Subjective visual disturbance    3. Pituitary microadenoma (H)       The patient is a 49-year old female who presents for consultation from Dr Elkins for evaluation of subjective visual field disturbance. She reports being diagnosed with a 3 mm functional pituitary adenoma in 2009 with symptoms related to hyperprolactinemia. She had resolution of symptoms with bromocriptine and continued on therapy until around 2362-5961. In 2018, she began having symptoms of hirsutism, amenorrhea, headaches, breast pain. MRI brain was completed and showed a 6 mm prolactinoma. She restarted bromocriptine and her symptoms improved somewhat. Her history is complicated in that she has had ovarian failure and PCOS. She also developed occipital headaches in 2018. She sees Dr Watson (neurologist) for headaches and is currently on propranolol. Her first visual symptoms were diplopia. She does not have diplopia when wearing her contact lenses. No eye pain or pain with motility. She does endorse dry eye and tearing left eye > right eye. Her vision is otherwise stable when wearing corrective lenses. She does not notice a visual field cut. She has not had new flashes or floaters.    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 Began wearing glasses age 7  Past medical history: prolactinoma, HTN, PCOS, hypothyroid, T2DM  Medications: Synthroid, Cytomel, estrogen, vitamin E, fish oil, vitamin D, bromocriptine, propranolol  Family history: no known eye disease     Imaging: MRI brain 10/2018 showed a 6 mm \"stable\" right sellar pituitary microadenoma    Labs: Prolactin 9/30/19 was 15.1    Visual acuity was 20/20 right eye, 20/30 left eye. Pupil exam normal w/o an APD. IOP was borderline elevated each eye. Color plates were full each eye. Confrontational visual fields were full each " eye. Strabismus exam was normal. Anterior slit lamp exam was normal each eye. Dilated funduscopic exam showed normal optic nerves and macula each eye.     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 OCT RNFL was normal right eye and left eye. OCT macula was normal each eye.    Is my impression that the patient is complaining of blurred vision.  She has unreliable visual fields from 2018.  Her most recent visual field from today shows a normal result in the right eye and a paracentral defect in the left eye.  I personally reviewed her MRI from October 2018 and it shows the pituitary lesion is not compressing the visual pathway.  I will order repeat MRI to make sure has not significantly increased in size.  If the pituitary gland is pressing on the chiasm, then this visual field defect is real and indicative of her symptoms.  If the gland is not touching the chiasm then most likely the visual field defect is artifact, and I would recommend follow up in 1 year.            Attending Physician Attestation:  Complete documentation of historical and exam elements from today's encounter can be found in the full encounter summary report (not reduplicated in this progress note).  I personally obtained the chief complaint(s) and history of present illness.  I confirmed and edited as necessary the review of systems, past medical/surgical history, family history, social history, and examination findings as documented by others; and I examined the patient myself.  I personally reviewed the relevant tests, images, and reports as documented above.  I formulated and edited as necessary the assessment and plan and discussed the findings and management plan with the patient and family. - Crow Saavedra MD  Department of Ophthalmology - PGY3

## 2019-10-31 NOTE — Clinical Note
"10/31/2019       RE: Socorro Etienne  [address]  [address] [address]  [facility] [address]     Dear Colleague,    Thank you for referring your patient, Socorro Etienne, to the EYE CLINIC at [facility]. Please see a copy of my visit note below.           Assessment & Plan     Socorro Etienne is a 49 year old female with the following diagnoses:   1. Visual field defect    2. Subjective visual disturbance       The patient is a 49-year old female who presents for consultation from Dr Elkins for evaluation of subjective visual field disturbance. She reports being diagnosed with a 3 mm functional pituitary adenoma in 2009 with symptoms related to hyperprolactinemia. She had resolution of symptoms with bromocriptine and continued on therapy until around 3956-9370. In 2018, she began having symptoms of hirsutism, amenorrhea, headaches, breast pain. MRI brain was completed and showed a 6 mm prolactinoma. She restarted bromocriptine and her symptoms improved somewhat. Her history is complicated in that she has had ovarian failure and PCOS. She also developed occipital headaches in 2018. She sees Dr Watson (neurologist) for headaches and is currently on propranolol. Her first visual symptoms were diplopia. She does not have diplopia when wearing her contact lenses. No eye pain or pain with motility. She does endorse dry eye and tearing left eye > right eye. Her vision is otherwise stable when wearing corrective lenses. She does not notice a visual field cut. She has not had new flashes or floaters.    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 Began wearing glasses age 7  Past medical history: prolactinoma, HTN, PCOS, hypothyroid, T2DM  Medications: Synthroid, Cytomel, estrogen, vitamin E, fish oil, vitamin D, bromocriptine, propranolol  Family history: no known eye disease     Imaging: MRI brain 10/2018 showed a 6 mm \"stable\" right sellar pituitary " microadenoma    Labs: Prolactin 9/30/19 was 15.1    Visual acuity was 20/20 right eye, 20/30 left eye. Pupil exam normal w/o an APD. IOP was borderline elevated each eye. Color plates were full each eye. Confrontational visual fields were full each eye. Strabismus exam was normal. Anterior slit lamp exam was normal each eye. Dilated funduscopic exam showed normal optic nerves and macula each eye.     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 OCT RNFL was normal right eye and left eye. OCT macula was normal each eye.    It is my impression that the patient has central depression of the left eye on her visual field that would be uncharacteristic of a chiasmal lesion.  I personally reviewed her MRI from October 2018 and it shows the pituitary lesion is not compressing the visual pathway.  I will order repeat MRI to make sure has not significantly increased in size.  If stable would recommend follow up in 1 year.            Attending Physician Attestation:  Complete documentation of historical and exam elements from today's encounter can be found in the full encounter summary report (not reduplicated in this progress note).  I personally obtained the chief complaint(s) and history of present illness.  I confirmed and edited as necessary the review of systems, past medical/surgical history, family history, social history, and examination findings as documented by others; and I examined the patient myself.  I personally reviewed the relevant tests, images, and reports as documented above.  I formulated and edited as necessary the assessment and plan and discussed the findings and management plan with the patient and family. - Crow Saavedra MD  Department of Ophthalmology - PGY3           Assessment & Plan     Socorro Etienne is a 49 year old female with the following diagnoses:   1. Visual field defect    2. Subjective visual  "disturbance    3. Pituitary microadenoma (H)       The patient is a 49-year old female who presents for consultation from Dr Elkins for evaluation of subjective visual field disturbance. She reports being diagnosed with a 3 mm functional pituitary adenoma in 2009 with symptoms related to hyperprolactinemia. She had resolution of symptoms with bromocriptine and continued on therapy until around 6671-3912. In 2018, she began having symptoms of hirsutism, amenorrhea, headaches, breast pain. MRI brain was completed and showed a 6 mm prolactinoma. She restarted bromocriptine and her symptoms improved somewhat. Her history is complicated in that she has had ovarian failure and PCOS. She also developed occipital headaches in 2018. She sees Dr Watson (neurologist) for headaches and is currently on propranolol. Her first visual symptoms were diplopia. She does not have diplopia when wearing her contact lenses. No eye pain or pain with motility. She does endorse dry eye and tearing left eye > right eye. Her vision is otherwise stable when wearing corrective lenses. She does not notice a visual field cut. She has not had new flashes or floaters.    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 Began wearing glasses age 7  Past medical history: prolactinoma, HTN, PCOS, hypothyroid, T2DM  Medications: Synthroid, Cytomel, estrogen, vitamin E, fish oil, vitamin D, bromocriptine, propranolol  Family history: no known eye disease     Imaging: MRI brain 10/2018 showed a 6 mm \"stable\" right sellar pituitary microadenoma    Labs: Prolactin 9/30/19 was 15.1    Visual acuity was 20/20 right eye, 20/30 left eye. Pupil exam normal w/o an APD. IOP was borderline elevated each eye. Color plates were full each eye. Confrontational visual fields were full each eye. Strabismus exam was normal. Anterior slit lamp exam was normal each eye. Dilated funduscopic exam showed normal optic nerves and macula each eye. "     Visual field testing was reliable each eye and was full right eye and showed central scotoma left eye with MD - 2.3. OCT RNFL was normal right eye and left eye. OCT macula was normal each eye.    Is my impression that the patient is complaining of blurred vision.  She has unreliable visual fields from 2018.  Her most recent visual field from today shows a normal result in the right eye and a paracentral defect in the left eye.  I personally reviewed her MRI from October 2018 and it shows the pituitary lesion is not compressing the visual pathway.  I will order repeat MRI to make sure has not significantly increased in size.  If the pituitary gland is pressing on the chiasm, then this visual field defect is real and indicative of her symptoms.  If the gland is not touching the chiasm then most likely the visual field defect is artifact, and I would recommend follow up in 1 year.            Attending Physician Attestation:  Complete documentation of historical and exam elements from today's encounter can be found in the full encounter summary report (not reduplicated in this progress note).  I personally obtained the chief complaint(s) and history of present illness.  I confirmed and edited as necessary the review of systems, past medical/surgical history, family history, social history, and examination findings as documented by others; and I examined the patient myself.  I personally reviewed the relevant tests, images, and reports as documented above.  I formulated and edited as necessary the assessment and plan and discussed the findings and management plan with the patient and family. - Crow Saavedra MD  Department of Ophthalmology - PGY3    Again, thank you for allowing me to participate in the care of your patient.      Sincerely,    Crow Zarate MD

## 2019-10-31 NOTE — LETTER
"10/31/2019    RE: Socorro Etienne  [address]  [address] [address]  [facility] [address]     Assessment & Plan     Socorro Etienne is a 49 year old female with the following diagnoses:   1. Visual field defect    2. Subjective visual disturbance    3. Pituitary microadenoma (H)       The patient is a 49-year old female who presents for consultation from Dr Elkins for evaluation of subjective visual field disturbance. She reports being diagnosed with a 3 mm functional pituitary adenoma in 2009 with symptoms related to hyperprolactinemia. She had resolution of symptoms with bromocriptine and continued on therapy until around 2941-7030. In 2018, she began having symptoms of hirsutism, amenorrhea, headaches, breast pain. MRI brain was completed and showed a 6 mm prolactinoma. She restarted bromocriptine and her symptoms improved somewhat. Her history is complicated in that she has had ovarian failure and PCOS. She also developed occipital headaches in 2018. She sees Dr Watson (neurologist) for headaches and is currently on propranolol. Her first visual symptoms were diplopia. She does not have diplopia when wearing her contact lenses. No eye pain or pain with motility. She does endorse dry eye and tearing left eye > right eye. Her vision is otherwise stable when wearing corrective lenses. She does not notice a visual field cut. She has not had new flashes or floaters.    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 Began wearing glasses age 7  Past medical history: prolactinoma, HTN, PCOS, hypothyroid, T2DM  Medications: Synthroid, Cytomel, estrogen, vitamin E, fish oil, vitamin D, bromocriptine, propranolol  Family history: no known eye disease     Imaging: MRI brain 10/2018 showed a 6 mm \"stable\" right sellar pituitary microadenoma    Labs: Prolactin 9/30/19 was 15.1    Visual acuity was 20/20 right eye, 20/30 left eye. Pupil exam normal w/o an APD. IOP was borderline elevated each eye. Color " plates were full each eye. Confrontational visual fields were full each eye. Strabismus exam was normal. Anterior slit lamp exam was normal each eye. Dilated funduscopic exam showed normal optic nerves and macula each eye.     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 OCT RNFL was normal right eye and left eye. OCT macula was normal each eye.    Is my impression that the patient is complaining of blurred vision.  She has unreliable visual fields from 2018.  Her most recent visual field from today shows a normal result in the right eye and a paracentral defect in the left eye.  I personally reviewed her MRI from October 2018 and it shows the pituitary lesion is not compressing the visual pathway.  I will order repeat MRI to make sure has not significantly increased in size.  If the pituitary gland is pressing on the chiasm, then this visual field defect is real and indicative of her symptoms.  If the gland is not touching the chiasm then most likely the visual field defect is artifact, and I would recommend follow up in 1 year.       Attending Physician Attestation:  Complete documentation of historical and exam elements from today's encounter can be found in the full encounter summary report (not reduplicated in this progress note).  I personally obtained the chief complaint(s) and history of present illness.  I confirmed and edited as necessary the review of systems, past medical/surgical history, family history, social history, and examination findings as documented by others; and I examined the patient myself.  I personally reviewed the relevant tests, images, and reports as documented above.  I formulated and edited as necessary the assessment and plan and discussed the findings and management plan with the patient and family. - Crow Saavedra MD  Department of Ophthalmology - PGY3    Crow Zarate MD

## 2019-11-02 ENCOUNTER — HOSPITAL ENCOUNTER (OUTPATIENT)
Dept: MRI IMAGING | Facility: CLINIC | Age: 49
Discharge: HOME | End: 2019-11-02
Attending: OPHTHALMOLOGY | Admitting: OPHTHALMOLOGY
Payer: COMMERCIAL

## 2019-11-02 DIAGNOSIS — D35.2 PITUITARY MICROADENOMA (H): ICD-10-CM

## 2019-11-02 DIAGNOSIS — H53.10 SUBJECTIVE VISUAL DISTURBANCE: ICD-10-CM

## 2019-11-02 DIAGNOSIS — H53.40 VISUAL FIELD DEFECT: ICD-10-CM

## 2019-11-02 PROCEDURE — 25500064 ZZH RX 255 OP 636: Performed by: OPHTHALMOLOGY

## 2019-11-02 PROCEDURE — 70543 MRI ORBT/FAC/NCK W/O &W/DYE: CPT

## 2019-11-02 PROCEDURE — A9585 GADOBUTROL INJECTION: HCPCS | Performed by: OPHTHALMOLOGY

## 2019-11-02 RX ORDER — GADOBUTROL 604.72 MG/ML
11 INJECTION INTRAVENOUS ONCE
Status: COMPLETED | OUTPATIENT
Start: 2019-11-02 | End: 2019-11-02

## 2019-11-02 RX ADMIN — GADOBUTROL 11 ML: 604.72 INJECTION INTRAVENOUS at 07:17

## 2019-11-05 DIAGNOSIS — E22.9 PITUITARY MICROADENOMA WITH HYPERPROLACTINEMIA (H): ICD-10-CM

## 2019-11-05 DIAGNOSIS — D35.2 PITUITARY MICROADENOMA WITH HYPERPROLACTINEMIA (H): ICD-10-CM

## 2019-11-05 DIAGNOSIS — E03.9 HYPOTHYROIDISM, UNSPECIFIED TYPE: ICD-10-CM

## 2019-11-05 LAB
PHOSPHATE SERPL-MCNC: 3.3 MG/DL (ref 2.5–4.5)
TSH SERPL DL<=0.005 MIU/L-ACNC: 1.71 MU/L (ref 0.4–4)

## 2019-11-05 PROCEDURE — 82306 VITAMIN D 25 HYDROXY: CPT | Performed by: FAMILY MEDICINE

## 2019-11-05 PROCEDURE — 84100 ASSAY OF PHOSPHORUS: CPT | Performed by: FAMILY MEDICINE

## 2019-11-05 PROCEDURE — 36415 COLL VENOUS BLD VENIPUNCTURE: CPT | Performed by: FAMILY MEDICINE

## 2019-11-05 PROCEDURE — 84443 ASSAY THYROID STIM HORMONE: CPT | Performed by: FAMILY MEDICINE

## 2019-11-05 PROCEDURE — 83010 ASSAY OF HAPTOGLOBIN QUANT: CPT | Performed by: FAMILY MEDICINE

## 2019-11-06 LAB — HAPTOGLOB SERPL-MCNC: 198 MG/DL (ref 15–200)

## 2019-11-06 NOTE — RESULT ENCOUNTER NOTE
Socorro, your results today were all normal.  Please let me know if you have any questions.  Brittanie Elkins MD

## 2019-11-06 NOTE — RESULT ENCOUNTER NOTE
Socorro, your next result is now in and normal.  Please let me know if you have any questions.  Brittanie Elkins MD

## 2019-11-09 ENCOUNTER — HEALTH MAINTENANCE LETTER (OUTPATIENT)
Age: 49
End: 2019-11-09

## 2019-11-11 LAB
DEPRECATED CALCIDIOL+CALCIFEROL SERPL-MC: 48 UG/L (ref 20–75)
VITAMIN D2 SERPL-MCNC: 36 UG/L
VITAMIN D3 SERPL-MCNC: 12 UG/L

## 2020-01-06 DIAGNOSIS — E55.9 VITAMIN D DEFICIENCY DISEASE: ICD-10-CM

## 2020-01-06 RX ORDER — CHOLECALCIFEROL (VITAMIN D3) 50 MCG
1 TABLET ORAL DAILY
Qty: 90 TABLET | Refills: 3 | Status: SHIPPED | OUTPATIENT
Start: 2020-01-06

## 2020-01-06 RX ORDER — ERGOCALCIFEROL 1.25 MG/1
CAPSULE, LIQUID FILLED ORAL
Qty: 8 CAPSULE | Refills: 0 | OUTPATIENT
Start: 2020-01-06

## 2020-01-06 NOTE — TELEPHONE ENCOUNTER
Pending Prescriptions:                       Disp   Refills    vitamin D2 (ERGOCALCIFEROL) 29038 units (1*8 caps*0        Sig: TAKE 1 CAPSULE BY MOUTH 1 TIME A WEEK    Routing refill request to provider for review/approval because:  Drug not on the [facility] refill protocol     Marce Chairez, BSN, RN, PHN

## 2020-01-06 NOTE — TELEPHONE ENCOUNTER
THis was an 8 week catch up then it moves to D3 to maintain.  This course has been completed. D3 sent.  Brittanie Elkins MD
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
## 2020-01-21 ASSESSMENT — ENCOUNTER SYMPTOMS
DIZZINESS: 0
ARTHRALGIAS: 0
NAUSEA: 0
CHILLS: 0
MYALGIAS: 0
NERVOUS/ANXIOUS: 0
WEAKNESS: 0
JOINT SWELLING: 0
CONSTIPATION: 0
PARESTHESIAS: 0
HEADACHES: 0
BREAST MASS: 0
EYE PAIN: 0
FEVER: 0
HEARTBURN: 0
SORE THROAT: 0
ABDOMINAL PAIN: 0
HEMATURIA: 0
SHORTNESS OF BREATH: 0
HEMATOCHEZIA: 0
DYSURIA: 0
FREQUENCY: 0
DIARRHEA: 0
COUGH: 0
PALPITATIONS: 0

## 2020-01-22 ENCOUNTER — OFFICE VISIT (OUTPATIENT)
Dept: FAMILY MEDICINE | Facility: OTHER | Age: 50
End: 2020-01-22
Payer: COMMERCIAL

## 2020-01-22 VITALS
DIASTOLIC BLOOD PRESSURE: 80 MMHG | HEART RATE: 92 BPM | HEIGHT: 61 IN | SYSTOLIC BLOOD PRESSURE: 110 MMHG | TEMPERATURE: 98.2 F | WEIGHT: 220 LBS | RESPIRATION RATE: 20 BRPM | OXYGEN SATURATION: 99 % | BODY MASS INDEX: 41.54 KG/M2

## 2020-01-22 DIAGNOSIS — E03.9 HYPOTHYROIDISM, UNSPECIFIED TYPE: ICD-10-CM

## 2020-01-22 DIAGNOSIS — Z15.89 MTHFR GENE MUTATION: ICD-10-CM

## 2020-01-22 DIAGNOSIS — D50.0 IRON DEFICIENCY ANEMIA DUE TO CHRONIC BLOOD LOSS: ICD-10-CM

## 2020-01-22 DIAGNOSIS — E55.9 VITAMIN D DEFICIENCY: ICD-10-CM

## 2020-01-22 DIAGNOSIS — D35.2 PITUITARY MICROADENOMA WITH HYPERPROLACTINEMIA (H): ICD-10-CM

## 2020-01-22 DIAGNOSIS — Z12.4 SCREENING FOR MALIGNANT NEOPLASM OF CERVIX: ICD-10-CM

## 2020-01-22 DIAGNOSIS — E28.2 PCOS (POLYCYSTIC OVARIAN SYNDROME): ICD-10-CM

## 2020-01-22 DIAGNOSIS — E22.9 PITUITARY MICROADENOMA WITH HYPERPROLACTINEMIA (H): ICD-10-CM

## 2020-01-22 DIAGNOSIS — E22.1 HYPERPROLACTINEMIA (H): ICD-10-CM

## 2020-01-22 DIAGNOSIS — Z00.00 ENCOUNTER FOR ROUTINE ADULT HEALTH EXAMINATION WITHOUT ABNORMAL FINDINGS: Primary | ICD-10-CM

## 2020-01-22 DIAGNOSIS — E78.5 HYPERLIPIDEMIA LDL GOAL <130: ICD-10-CM

## 2020-01-22 DIAGNOSIS — E66.01 MORBID OBESITY (H): ICD-10-CM

## 2020-01-22 PROCEDURE — 99396 PREV VISIT EST AGE 40-64: CPT | Performed by: FAMILY MEDICINE

## 2020-01-22 ASSESSMENT — ENCOUNTER SYMPTOMS
NAUSEA: 0
FREQUENCY: 0
HEARTBURN: 0
HEMATURIA: 0
DYSURIA: 0
DIZZINESS: 0
COUGH: 0
DIARRHEA: 0
MYALGIAS: 0
HEADACHES: 0
HEMATOCHEZIA: 0
EYE PAIN: 0
CONSTIPATION: 0
PALPITATIONS: 0
FEVER: 0
ARTHRALGIAS: 0
SORE THROAT: 0
BREAST MASS: 0
WEAKNESS: 0
NERVOUS/ANXIOUS: 0
ABDOMINAL PAIN: 0
CHILLS: 0
JOINT SWELLING: 0
SHORTNESS OF BREATH: 0
PARESTHESIAS: 0

## 2020-01-22 ASSESSMENT — MIFFLIN-ST. JEOR: SCORE: 1565.29

## 2020-01-23 ENCOUNTER — TRANSFERRED RECORDS (OUTPATIENT)
Dept: HEALTH INFORMATION MANAGEMENT | Facility: CLINIC | Age: 50
End: 2020-01-23

## 2020-01-23 LAB
C TRACH DNA SPEC QL PROBE+SIG AMP: NEGATIVE
HPV ABSTRACT: NORMAL
N GONORRHOEA DNA SPEC QL PROBE+SIG AMP: NEGATIVE
PAP-ABSTRACT: NORMAL
SPECIMEN DESCRIP: NORMAL
SPECIMEN DESCRIPTION: NORMAL

## 2020-01-27 ENCOUNTER — MYC MEDICAL ADVICE (OUTPATIENT)
Dept: FAMILY MEDICINE | Facility: OTHER | Age: 50
End: 2020-01-27

## 2020-01-27 DIAGNOSIS — D35.2 PITUITARY MICROADENOMA WITH HYPERPROLACTINEMIA (H): ICD-10-CM

## 2020-01-27 DIAGNOSIS — E22.9 PITUITARY MICROADENOMA WITH HYPERPROLACTINEMIA (H): ICD-10-CM

## 2020-01-27 DIAGNOSIS — E66.01 MORBID OBESITY (H): ICD-10-CM

## 2020-01-27 DIAGNOSIS — E78.5 HYPERLIPIDEMIA LDL GOAL <130: ICD-10-CM

## 2020-01-27 LAB
CHOLEST SERPL-MCNC: 188 MG/DL
GLUCOSE SERPL-MCNC: 113 MG/DL (ref 70–99)
HDLC SERPL-MCNC: 36 MG/DL
LDLC SERPL CALC-MCNC: 122 MG/DL
NONHDLC SERPL-MCNC: 152 MG/DL
TRIGL SERPL-MCNC: 152 MG/DL

## 2020-01-27 PROCEDURE — 36415 COLL VENOUS BLD VENIPUNCTURE: CPT | Performed by: FAMILY MEDICINE

## 2020-01-27 PROCEDURE — 80061 LIPID PANEL: CPT | Performed by: FAMILY MEDICINE

## 2020-01-27 PROCEDURE — 82947 ASSAY GLUCOSE BLOOD QUANT: CPT | Performed by: FAMILY MEDICINE

## 2020-01-27 NOTE — RESULT ENCOUNTER NOTE
Socorro, your LDL (bad cholesterol) is up and the HDL (good cholesterol) is stable but still low. As well as your blood sugar is in an at risk zone for diabetes (above normal, but below level for diabetes). I am sure you have many resources, but if you are interested in visiting with a nutritionist to make a lifestyle plan for this, let us know.  Please let me know if you have any questions.  Brittanie Elkins MD

## 2020-01-28 ENCOUNTER — MYC MEDICAL ADVICE (OUTPATIENT)
Dept: FAMILY MEDICINE | Facility: OTHER | Age: 50
End: 2020-01-28

## 2020-01-29 NOTE — TELEPHONE ENCOUNTER
Hi Dr Elkins and Yudi,    Thank you for your message.  At this time we do not abstract results from patient uploaded records.  I apologize for the inconvenience.  We need these documents scanned into Epic, into this patient's chart by our Health Information department, in order to abstract them.   We are not within the Health Information department.     We work remotely within the Data Abstraction and Registries department and do not have access to either a printer, or a scanner for this purpose.    Once you get these printed off and/or faxed to HIM we will get them electronically for abstraction(this happens automatically you do not need to message us unless you feel you need to).     Let us know if you have any questions about this.    Thanks,  Jeet, Abstracting Team

## 2020-06-16 ENCOUNTER — TELEPHONE (OUTPATIENT)
Dept: NEUROLOGY | Facility: CLINIC | Age: 50
End: 2020-06-16

## 2020-06-19 ENCOUNTER — MYC MEDICAL ADVICE (OUTPATIENT)
Dept: NEUROLOGY | Facility: CLINIC | Age: 50
End: 2020-06-19

## 2020-06-25 ENCOUNTER — VIRTUAL VISIT (OUTPATIENT)
Dept: NEUROLOGY | Facility: CLINIC | Age: 50
End: 2020-06-25
Payer: COMMERCIAL

## 2020-06-25 DIAGNOSIS — G43.009 MIGRAINE WITHOUT AURA AND WITHOUT STATUS MIGRAINOSUS, NOT INTRACTABLE: ICD-10-CM

## 2020-06-25 PROCEDURE — 99213 OFFICE O/P EST LOW 20 MIN: CPT | Mod: 95 | Performed by: PSYCHIATRY & NEUROLOGY

## 2020-06-25 RX ORDER — PROPRANOLOL HCL 60 MG
60 CAPSULE, EXTENDED RELEASE 24HR ORAL DAILY
Qty: 90 CAPSULE | Refills: 3 | Status: SHIPPED | OUTPATIENT
Start: 2020-06-25 | End: 2021-02-04

## 2020-06-25 RX ORDER — CABERGOLINE 0.5 MG/1
0.5 TABLET ORAL
COMMUNITY
End: 2022-04-22

## 2020-06-25 RX ORDER — NAPROXEN 500 MG/1
TABLET ORAL
Qty: 15 TABLET | Refills: 1 | Status: SHIPPED | OUTPATIENT
Start: 2020-06-25 | End: 2020-10-14

## 2020-06-25 NOTE — LETTER
"    6/25/2020         RE: Socorro Etienne  [address]  [address] [address]  [facility] [address]        Dear Colleague,    Thank you for referring your patient, Socorro Etienne, to [facility]. Please see a copy of my visit note below.    Socorro Etienne is a 49 year old female who is being evaluated via a billable video visit.      The patient has been notified of following:     \"This video visit will be conducted via a call between you and your physician/provider. We have found that certain health care needs can be provided without the need for an in-person physical exam.  This service lets us provide the care you need with a video conversation.  If a prescription is necessary we can send it directly to your pharmacy.  If lab work is needed we can place an order for that and you can then stop by our lab to have the test done at a later time.    Video visits are billed at different rates depending on your insurance coverage.  Please reach out to your insurance provider with any questions.    If during the course of the call the physician/provider feels a video visit is not appropriate, you will not be charged for this service.\"    Patient has given verbal consent for Video visit? Yes    Will anyone else be joining your video visit? No                  Visit Date:   06/25/2020      This is a virtual video followup visit being done because of COVID-19.  Socorro Etienne is a patient who I see with migraine without aura.  She also has a pituitary tumor that is being monitored by others.      She continues on propranolol XR 60 mg a day and tolerates it well and generally this has controlled her headaches.  She does, however, tell me about 6 weeks ago she had a daily headache for about a week.  A lot of things were happening around that time.  She had low iron and had received an infusion.  Her TSH has gone up in her thyroid dose needed to be adjusted.  Also, she has been started on cabergoline because of her " prolactin secreting pituitary adenoma had increased in size.      She tells me now she rarely has a headache.  She has tried naproxen 500 mg for acute management and finds it very helpful.      Again today, I note that she had adverse side effects from Topamax, zonisamide and venlafaxine.      IMPRESSION:  Migraine without aura.      PLAN:  She will continue on propranolol XR 60 mg a day and can utilize naproxen 500 mg for acute management.  This seems to be working well for her.      I plan on seeing her back early next year.      Total visit time was 16 minutes from 3:23 p.m. until 3:39 p.m.  [employer] was utilized as the platform.  I was at the clinic at [place].  The patient was in a car (not driving).         LEROY FORTUNE MD             D: 2020   T: 2020   MT:       Name:     MARIELLA FLORES   MRN:      [id number]        Account:      [id number]   :      1970           Visit Date:   2020      Document: [id number]        Again, thank you for allowing me to participate in the care of your patient.        Sincerely,        Leroy Fortune MD

## 2020-06-25 NOTE — PROGRESS NOTES
"Socorro Etienne is a 49 year old female who is being evaluated via a billable video visit.      The patient has been notified of following:     \"This video visit will be conducted via a call between you and your physician/provider. We have found that certain health care needs can be provided without the need for an in-person physical exam.  This service lets us provide the care you need with a video conversation.  If a prescription is necessary we can send it directly to your pharmacy.  If lab work is needed we can place an order for that and you can then stop by our lab to have the test done at a later time.    Video visits are billed at different rates depending on your insurance coverage.  Please reach out to your insurance provider with any questions.    If during the course of the call the physician/provider feels a video visit is not appropriate, you will not be charged for this service.\"    Patient has given verbal consent for Video visit? Yes    Will anyone else be joining your video visit? No                "

## 2020-06-26 NOTE — PROGRESS NOTES
Visit Date:   2020      This is a virtual video followup visit being done because of COVID-19.  Socorro Etienne is a patient who I see with migraine without aura.  She also has a pituitary tumor that is being monitored by others.      She continues on propranolol XR 60 mg a day and tolerates it well and generally this has controlled her headaches.  She does, however, tell me about 6 weeks ago she had a daily headache for about a week.  A lot of things were happening around that time.  She had low iron and had received an infusion.  Her TSH has gone up in her thyroid dose needed to be adjusted.  Also, she has been started on cabergoline because of her prolactin secreting pituitary adenoma had increased in size.      She tells me now she rarely has a headache.  She has tried naproxen 500 mg for acute management and finds it very helpful.      Again today, I note that she had adverse side effects from Topamax, zonisamide and venlafaxine.      IMPRESSION:  Migraine without aura.      PLAN:  She will continue on propranolol XR 60 mg a day and can utilize naproxen 500 mg for acute management.  This seems to be working well for her.      I plan on seeing her back early next year.      Total visit time was 16 minutes from 3:23 p.m. until 3:39 p.m.  [employer] was utilized as the platform.  I was at the clinic at [place].  The patient was in a car (not driving).         SIMEON BOLTON MD             D: 2020   T: 2020   MT:       Name:     SOCORRO ETIENNE   MRN:      [id number]        Account:      [id number]   :      1970           Visit Date:   2020      Document: [id number]

## 2020-08-26 ENCOUNTER — HOSPITAL ENCOUNTER (OUTPATIENT)
Dept: MAMMOGRAPHY | Facility: CLINIC | Age: 50
Discharge: HOME | End: 2020-08-26
Attending: SPECIALIST | Admitting: SPECIALIST
Payer: COMMERCIAL

## 2020-08-26 DIAGNOSIS — Z12.31 VISIT FOR SCREENING MAMMOGRAM: ICD-10-CM

## 2020-08-26 PROCEDURE — 77063 BREAST TOMOSYNTHESIS BI: CPT

## 2020-09-18 NOTE — PROGRESS NOTES
Subjective     Socorro Etienne is a 50 year old female who presents to clinic today for the following health issues:    HPI   Follow up from blood work  Continues with chronic fatigue and has worsened lately and woul dlike to recheck they thyroid. 3/22/20 last cycle. Nearing menopause.  Would like update on her endocrine and vitamin labs.  Works with Dr. Nichols and is managing MRI and will continue to monitor, she believes annually. But she will call to clarify.    She has had chronic issues with her pituiatry microadenoma, but feels much better than last year and feels likely on the right track, though mild worsening lately.       Review of Systems   Constitutional, HEENT, cardiovascular, pulmonary, GI, , musculoskeletal, neuro, skin, endocrine and psych systems are negative, except as otherwise noted.      Objective    /70   Pulse 87   Temp 96.6  F (35.9  C) (Temporal)   Resp 14   Wt 93.7 kg (206 lb 8 oz)   LMP 03/22/2020 (Exact Date)   SpO2 97%   BMI 39.02 kg/m    Body mass index is 39.02 kg/m .  Physical Exam   GENERAL: healthy, alert and no distress  NECK: no adenopathy, no asymmetry, masses, or scars and thyroid normal to palpation  RESP: lungs clear to auscultation - no rales, rhonchi or wheezes  CV: regular rate and rhythm, normal S1 S2, no S3 or S4, no murmur, click or rub, no peripheral edema and peripheral pulses strong  MS: no gross musculoskeletal defects noted, no edema  SKIN: no suspicious lesions or rashes  NEURO: Normal strength and tone, mentation intact and speech normal  PSYCH: mentation appears normal, affect normal/bright            Assessment & Plan       ICD-10-CM    1. Pituitary microadenoma with hyperprolactinemia (H)  D35.2     E22.9    2. Chronic fatigue  R53.82 **Vitamin D Deficiency FUTURE 2mo     **Vitamin B12 FUTURE 2mo     Vitamin B6     Haptoglobin     Comprehensive metabolic panel (BMP + Alb, Alk Phos, ALT, AST, Total. Bili, TP)     CBC with platelets      "Bilirubin urine     TSH with free T4 reflex     Phosphorus     Prolactin     Ferritin     Lipid panel reflex to direct LDL Fasting   3. Hypothyroidism, unspecified type  E03.9 TSH with free T4 reflex   4. Class 2 obesity without serious comorbidity with body mass index (BMI) of 39.0 to 39.9 in adult, unspecified obesity type  E66.9 Comprehensive metabolic panel (BMP + Alb, Alk Phos, ALT, AST, Total. Bili, TP)    Z68.39 Lipid panel reflex to direct LDL Fasting   5. Hyperprolactinemia (H)  E22.1 Prolactin   6. Iron deficiency anemia due to chronic blood loss  D50.0 Haptoglobin     CBC with platelets     Ferritin   7. Morbid obesity (H)  E66.01    8. Hair loss  L65.9    9. PCOS (polycystic ovarian syndrome)  E28.2       Pap done with [facility] this year. Sent [facility] records to us which we will forward to quality abstracting to complete.  Does not desire flu shot.   Has mild worsening of symptoms from the hormonal changes after pituitary microadenoma, would like update on her labs. Will gather today.     BMI:   Estimated body mass index is 39.02 kg/m  as calculated from the following:    Height as of 1/22/20: 1.549 m (5' 1\").    Weight as of this encounter: 93.7 kg (206 lb 8 oz).               No follow-ups on file.    Brittanie Elkins MD, MD  [facility]    Counseled on above issues for 11 min and >50% of time was spent in counseling for issues below:   1. Pituitary microadenoma with hyperprolactinemia (H)    2. Chronic fatigue    3. Hypothyroidism, unspecified type    4. Class 2 obesity without serious comorbidity with body mass index (BMI) of 39.0 to 39.9 in adult, unspecified obesity type    5. Hyperprolactinemia (H)    6. Iron deficiency anemia due to chronic blood loss    7. Morbid obesity (H)    8. Hair loss    9. PCOS (polycystic ovarian syndrome)    .      "

## 2020-09-23 ENCOUNTER — OFFICE VISIT (OUTPATIENT)
Dept: FAMILY MEDICINE | Facility: OTHER | Age: 50
End: 2020-09-23
Payer: COMMERCIAL

## 2020-09-23 VITALS
WEIGHT: 206.5 LBS | SYSTOLIC BLOOD PRESSURE: 126 MMHG | RESPIRATION RATE: 14 BRPM | TEMPERATURE: 96.6 F | HEART RATE: 87 BPM | OXYGEN SATURATION: 97 % | BODY MASS INDEX: 39.02 KG/M2 | DIASTOLIC BLOOD PRESSURE: 70 MMHG

## 2020-09-23 DIAGNOSIS — E22.1 HYPERPROLACTINEMIA (H): ICD-10-CM

## 2020-09-23 DIAGNOSIS — E28.2 PCOS (POLYCYSTIC OVARIAN SYNDROME): ICD-10-CM

## 2020-09-23 DIAGNOSIS — E66.812 CLASS 2 OBESITY WITHOUT SERIOUS COMORBIDITY WITH BODY MASS INDEX (BMI) OF 39.0 TO 39.9 IN ADULT, UNSPECIFIED OBESITY TYPE: ICD-10-CM

## 2020-09-23 DIAGNOSIS — R53.82 CHRONIC FATIGUE: ICD-10-CM

## 2020-09-23 DIAGNOSIS — E22.9 PITUITARY MICROADENOMA WITH HYPERPROLACTINEMIA (H): Primary | ICD-10-CM

## 2020-09-23 DIAGNOSIS — E03.9 HYPOTHYROIDISM, UNSPECIFIED TYPE: ICD-10-CM

## 2020-09-23 DIAGNOSIS — E66.01 MORBID OBESITY (H): ICD-10-CM

## 2020-09-23 DIAGNOSIS — D50.0 IRON DEFICIENCY ANEMIA DUE TO CHRONIC BLOOD LOSS: ICD-10-CM

## 2020-09-23 DIAGNOSIS — D35.2 PITUITARY MICROADENOMA WITH HYPERPROLACTINEMIA (H): Primary | ICD-10-CM

## 2020-09-23 DIAGNOSIS — L65.9 HAIR LOSS: ICD-10-CM

## 2020-09-23 LAB
ALBUMIN SERPL-MCNC: 3.6 G/DL (ref 3.4–5)
ALP SERPL-CCNC: 94 U/L (ref 40–150)
ALT SERPL W P-5'-P-CCNC: 31 U/L (ref 0–50)
ANION GAP SERPL CALCULATED.3IONS-SCNC: 4 MMOL/L (ref 3–14)
AST SERPL W P-5'-P-CCNC: 13 U/L (ref 0–45)
BILIRUB SERPL-MCNC: 0.3 MG/DL (ref 0.2–1.3)
BILIRUB UR QL STRIP: NEGATIVE
BUN SERPL-MCNC: 11 MG/DL (ref 7–30)
CALCIUM SERPL-MCNC: 8.6 MG/DL (ref 8.5–10.1)
CHLORIDE SERPL-SCNC: 107 MMOL/L (ref 94–109)
CHOLEST SERPL-MCNC: 242 MG/DL
CO2 SERPL-SCNC: 31 MMOL/L (ref 20–32)
CREAT SERPL-MCNC: 0.6 MG/DL (ref 0.52–1.04)
ERYTHROCYTE [DISTWIDTH] IN BLOOD BY AUTOMATED COUNT: 12.4 % (ref 10–15)
FERRITIN SERPL-MCNC: 1081 NG/ML (ref 8–252)
GFR SERPL CREATININE-BSD FRML MDRD: >90 ML/MIN/{1.73_M2}
GLUCOSE SERPL-MCNC: 89 MG/DL (ref 70–99)
HCT VFR BLD AUTO: 41.7 % (ref 35–47)
HDLC SERPL-MCNC: 38 MG/DL
HGB BLD-MCNC: 13.6 G/DL (ref 11.7–15.7)
LDLC SERPL CALC-MCNC: 138 MG/DL
MCH RBC QN AUTO: 30.7 PG (ref 26.5–33)
MCHC RBC AUTO-ENTMCNC: 32.6 G/DL (ref 31.5–36.5)
MCV RBC AUTO: 94 FL (ref 78–100)
NONHDLC SERPL-MCNC: 204 MG/DL
PHOSPHATE SERPL-MCNC: 3.5 MG/DL (ref 2.5–4.5)
PLATELET # BLD AUTO: 240 10E9/L (ref 150–450)
POTASSIUM SERPL-SCNC: 3.8 MMOL/L (ref 3.4–5.3)
PROLACTIN SERPL-MCNC: <1 UG/L (ref 3–27)
PROT SERPL-MCNC: 6.9 G/DL (ref 6.8–8.8)
RBC # BLD AUTO: 4.43 10E12/L (ref 3.8–5.2)
SODIUM SERPL-SCNC: 142 MMOL/L (ref 133–144)
T4 FREE SERPL-MCNC: 1.32 NG/DL (ref 0.76–1.46)
TRIGL SERPL-MCNC: 330 MG/DL
TSH SERPL DL<=0.005 MIU/L-ACNC: 0.12 MU/L (ref 0.4–4)
VIT B12 SERPL-MCNC: 330 PG/ML (ref 193–986)
WBC # BLD AUTO: 6.7 10E9/L (ref 4–11)

## 2020-09-23 PROCEDURE — 99000 SPECIMEN HANDLING OFFICE-LAB: CPT | Performed by: FAMILY MEDICINE

## 2020-09-23 PROCEDURE — 84443 ASSAY THYROID STIM HORMONE: CPT | Performed by: FAMILY MEDICINE

## 2020-09-23 PROCEDURE — 36415 COLL VENOUS BLD VENIPUNCTURE: CPT | Performed by: FAMILY MEDICINE

## 2020-09-23 PROCEDURE — 84100 ASSAY OF PHOSPHORUS: CPT | Performed by: FAMILY MEDICINE

## 2020-09-23 PROCEDURE — 81003 URINALYSIS AUTO W/O SCOPE: CPT | Performed by: FAMILY MEDICINE

## 2020-09-23 PROCEDURE — 85027 COMPLETE CBC AUTOMATED: CPT | Performed by: FAMILY MEDICINE

## 2020-09-23 PROCEDURE — 82607 VITAMIN B-12: CPT | Performed by: FAMILY MEDICINE

## 2020-09-23 PROCEDURE — 84439 ASSAY OF FREE THYROXINE: CPT | Performed by: FAMILY MEDICINE

## 2020-09-23 PROCEDURE — 83010 ASSAY OF HAPTOGLOBIN QUANT: CPT | Performed by: FAMILY MEDICINE

## 2020-09-23 PROCEDURE — 99214 OFFICE O/P EST MOD 30 MIN: CPT | Performed by: FAMILY MEDICINE

## 2020-09-23 PROCEDURE — 80053 COMPREHEN METABOLIC PANEL: CPT | Performed by: FAMILY MEDICINE

## 2020-09-23 PROCEDURE — 84146 ASSAY OF PROLACTIN: CPT | Performed by: FAMILY MEDICINE

## 2020-09-23 PROCEDURE — 80061 LIPID PANEL: CPT | Performed by: FAMILY MEDICINE

## 2020-09-23 PROCEDURE — 82306 VITAMIN D 25 HYDROXY: CPT | Performed by: FAMILY MEDICINE

## 2020-09-23 PROCEDURE — 84207 ASSAY OF VITAMIN B-6: CPT | Mod: 90 | Performed by: FAMILY MEDICINE

## 2020-09-23 PROCEDURE — 82728 ASSAY OF FERRITIN: CPT | Performed by: FAMILY MEDICINE

## 2020-09-23 RX ORDER — HYDROCORTISONE 25 MG/G
CREAM TOPICAL
COMMUNITY
Start: 2020-08-27 | End: 2023-07-20

## 2020-09-23 RX ORDER — LINACLOTIDE 72 UG/1
CAPSULE, GELATIN COATED ORAL
COMMUNITY
Start: 2020-09-11

## 2020-09-23 RX ORDER — HYDROCORTISONE ACETATE 25 MG
SUPPOSITORY, RECTAL RECTAL
COMMUNITY
Start: 2020-08-27 | End: 2023-07-20

## 2020-09-23 RX ORDER — OMEPRAZOLE 40 MG/1
40 CAPSULE, DELAYED RELEASE ORAL DAILY
COMMUNITY
Start: 2020-09-10 | End: 2020-09-23

## 2020-09-23 RX ORDER — ESTRADIOL/NORETHINDRONE ACETATE TRANSDERMAL SYSTEM .05; .14 MG/D; MG/D
PATCH, EXTENDED RELEASE TRANSDERMAL
COMMUNITY
Start: 2020-09-10

## 2020-09-23 RX ORDER — TIZANIDINE 2 MG/1
TABLET ORAL
COMMUNITY
Start: 2020-08-27 | End: 2022-03-23

## 2020-09-23 RX ORDER — LEVOTHYROXINE SODIUM 112 MCG
TABLET ORAL
COMMUNITY
Start: 2020-08-27 | End: 2022-03-23

## 2020-09-23 NOTE — RESULT ENCOUNTER NOTE
Socorro, your first labs are normal.  Please let me know if you have any questions.  Brittanie Elkins MD

## 2020-09-24 LAB
DEPRECATED CALCIDIOL+CALCIFEROL SERPL-MC: 20 UG/L (ref 20–75)
HAPTOGLOB SERPL-MCNC: 186 MG/DL (ref 32–197)

## 2020-09-24 NOTE — RESULT ENCOUNTER NOTE
Socorro, vitamin D is just within normal limits and has dropped since last year.  Please let me know if you have any questions.  Brittanie Elkins MD

## 2020-09-24 NOTE — RESULT ENCOUNTER NOTE
Socorro, lots more results are in. Cholesterol has much worsened from last year. So we are hopeful that you can find a regular exercise program you tolerate plus that you are careful for fat intake and can recheck next year. Ferritin is high so it can be inflammation or more than needed replacement of iron. Though I do not have a iron supplement on your med list. Thyroid is slightly hyper and you may want to discuss with your thyroid provider as the medication can be adjusted. Otherwise labs look ok. Please let me know if you have any questions.  Brittanie Elkins MD

## 2020-09-26 LAB — VIT B6 SERPL-MCNC: 18.8 NMOL/L (ref 20–125)

## 2020-09-28 NOTE — RESULT ENCOUNTER NOTE
Socorro, your B6 is increasing, but still just under normal values.  Please let me know if you have any questions.  Brittanie Elkins MD

## 2020-10-13 DIAGNOSIS — G43.009 MIGRAINE WITHOUT AURA AND WITHOUT STATUS MIGRAINOSUS, NOT INTRACTABLE: ICD-10-CM

## 2020-10-14 RX ORDER — NAPROXEN 500 MG/1
TABLET ORAL
Qty: 15 TABLET | Refills: 1 | Status: SHIPPED | OUTPATIENT
Start: 2020-10-14 | End: 2021-02-04

## 2020-10-14 NOTE — TELEPHONE ENCOUNTER
Rx Authorization:    Requested Medication/ Dose: Naproxen 500    Date last refill ordered: 6/25/20    Quantity ordered: 15    # refills: 1    Date of last clinic visit with ordering provider: 6/25/20    Date of next clinic visit with ordering provider: 0    All pertinent protocol data (lab date/result):     Include pertinent information from patients message:

## 2020-10-15 ENCOUNTER — MYC MEDICAL ADVICE (OUTPATIENT)
Dept: FAMILY MEDICINE | Facility: OTHER | Age: 50
End: 2020-10-15

## 2020-10-15 DIAGNOSIS — E53.1 VITAMIN B6 DEFICIENCY: Primary | ICD-10-CM

## 2020-10-15 RX ORDER — LANOLIN ALCOHOL/MO/W.PET/CERES
100 CREAM (GRAM) TOPICAL DAILY
Qty: 90 TABLET | Refills: 1 | Status: SHIPPED | OUTPATIENT
Start: 2020-10-15 | End: 2022-04-22

## 2020-12-06 ENCOUNTER — HEALTH MAINTENANCE LETTER (OUTPATIENT)
Age: 50
End: 2020-12-06

## 2020-12-23 ENCOUNTER — HOSPITAL ENCOUNTER (OUTPATIENT)
Dept: MRI IMAGING | Facility: CLINIC | Age: 50
Discharge: HOME | End: 2020-12-23
Attending: SPECIALIST | Admitting: SPECIALIST
Payer: COMMERCIAL

## 2020-12-23 DIAGNOSIS — D35.2 PITUITARY ADENOMA (H): ICD-10-CM

## 2020-12-23 PROCEDURE — 255N000002 HC RX 255 OP 636

## 2020-12-23 PROCEDURE — A9585 GADOBUTROL INJECTION: HCPCS

## 2020-12-23 PROCEDURE — 70543 MRI ORBT/FAC/NCK W/O &W/DYE: CPT

## 2020-12-23 RX ORDER — GADOBUTROL 604.72 MG/ML
10 INJECTION INTRAVENOUS ONCE
Status: COMPLETED | OUTPATIENT
Start: 2020-12-23 | End: 2020-12-23

## 2020-12-23 RX ADMIN — GADOBUTROL 10 ML: 604.72 INJECTION INTRAVENOUS at 08:53

## 2021-02-04 ENCOUNTER — VIRTUAL VISIT (OUTPATIENT)
Dept: NEUROLOGY | Facility: CLINIC | Age: 51
End: 2021-02-04
Payer: COMMERCIAL

## 2021-02-04 DIAGNOSIS — G43.009 MIGRAINE WITHOUT AURA AND WITHOUT STATUS MIGRAINOSUS, NOT INTRACTABLE: ICD-10-CM

## 2021-02-04 PROCEDURE — 99212 OFFICE O/P EST SF 10 MIN: CPT | Mod: GT | Performed by: PSYCHIATRY & NEUROLOGY

## 2021-02-04 RX ORDER — PROPRANOLOL HCL 60 MG
60 CAPSULE, EXTENDED RELEASE 24HR ORAL DAILY
Qty: 90 CAPSULE | Refills: 3 | Status: SHIPPED | OUTPATIENT
Start: 2021-02-04 | End: 2022-02-28

## 2021-02-04 RX ORDER — NAPROXEN 500 MG/1
TABLET ORAL
Qty: 15 TABLET | Refills: 1 | Status: SHIPPED | OUTPATIENT
Start: 2021-02-04 | End: 2021-03-03

## 2021-02-04 NOTE — LETTER
2/4/2021         RE: Socorro Etienne  [id number]  [address] [id number]  [facility] [address]        Dear Colleague,    Thank you for referring your patient, Socorro Etienne, to the [facility] [place]. Please see a copy of my visit note below.    Socorro is a 50 year old who is being evaluated via a billable video visit.      How would you like to obtain your AVS? [employer] [place] [facility]  If the video visit is dropped, the invitation should be resent by: Other e-mail: [employer]  Will anyone else be joining your video visit? No          Visit Date:   02/04/2021      This is a virtual video followup visit.  [employer] was utilized as the platform.      Socorro Etienne is a patient with common migraine.  She also has a prolactin secreting pituitary tumor.      Regarding her headaches, she tells me she is doing really good.  She tells me she has only had 5 headaches since I saw her in June of last year and she gets relief with naproxen.      She currently is on propranolol XR 60 mg and tolerates this well.  Naproxen dose is 500 mg for acute management.      I did review her chart today prior to our visit.  She did have a brain MRI scan done on 12/23/2020 to reevaluate the pituitary tumor.  It appears unchanged, perhaps somewhat improved.  The brain itself is normal.  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  She is on cabergoline because of prolactin secreting pituitary adenoma.      Her medication list was reviewed and updated.      IMPRESSION:  Migraine without aura.      PLAN:  She will continue on propranolol XR 60 mg for prophylaxis and can utilize naproxen 500 mg for acute management.      I AGAIN NOTE THAT SHE HAD ADVERSE SIDE EFFECTS FROM TOPAMAX, ZONISAMIDE AND VENLAFAXINE.      I have recommended a followup visit in 9 months.  I did tell her I will be retiring after June and she will likely be seeing Dr. David Butcher, who I highly recommended.      TOTAL VISIT TIME:  10 minutes; this included the  telephone visit itself, review of her MRI scan prior to the visit and documentation.         LEROY FORTUNE MD             D: 2021   T: 2021   MT: CIARRA      Name:     MARIELLA FLORES   MRN:      [id number]        Account:      [id number]   :      1970           Visit Date:   2021      Document: [id number]          Again, thank you for allowing me to participate in the care of your patient.        Sincerely,        Leroy Fortune MD

## 2021-02-04 NOTE — PROGRESS NOTES
Visit Date:   2021      This is a virtual video followup visit.  [employer] was utilized as the platform.      Socorro Etienne is a patient with common migraine.  She also has a prolactin secreting pituitary tumor.      Regarding her headaches, she tells me she is doing really good.  She tells me she has only had 5 headaches since I saw her in  of last year and she gets relief with naproxen.      She currently is on propranolol XR 60 mg and tolerates this well.  Naproxen dose is 500 mg for acute management.      I did review her chart today prior to our visit.  She did have a brain MRI scan done on 2020 to reevaluate the pituitary tumor.  It appears unchanged, perhaps somewhat improved.  The brain itself is normal.  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  She is on cabergoline because of prolactin secreting pituitary adenoma.      Her medication list was reviewed and updated.      IMPRESSION:  Migraine without aura.      PLAN:  She will continue on propranolol XR 60 mg for prophylaxis and can utilize naproxen 500 mg for acute management.      I AGAIN NOTE THAT SHE HAD ADVERSE SIDE EFFECTS FROM TOPAMAX, ZONISAMIDE AND VENLAFAXINE.      I have recommended a followup visit in 9 months.  I did tell her I will be retiring after  and she will likely be seeing Dr. David Butcher, who I highly recommended.      TOTAL VISIT TIME:  10 minutes; this included the telephone visit itself, review of her MRI scan prior to the visit and documentation.         SIMEON BOLTON MD             D: 2021   T: 2021   MT: CIARRA      Name:     SOCORRO ETIENNE   MRN:      [id number]        Account:      [id number]   :      1970           Visit Date:   2021      Document: [id number]

## 2021-02-04 NOTE — PROGRESS NOTES
Socorro is a 50 year old who is being evaluated via a billable video visit.      How would you like to obtain your AVS? [employer]  If the video visit is dropped, the invitation should be resent by: Other e-mail: [employer]  Will anyone else be joining your video visit? No

## 2021-02-09 NOTE — TELEPHONE ENCOUNTER
Diarrhea can be infectious, but if due to meds, should have conversation with pharmacist and possibly prescriber.  Fatigue is expected with propranolol, but was also helping. So need to get the feedback to neurology if thinking it is unmanageable.  Lastly is the tingling, most likely muscular, but should be asked about stroke like symptoms and as long as non-urgent, I will f/u and address in clinic as she is available.  Brittanie Elkins MD    
Last read by Socorro Etienne at 2:15 PM on 9/23/2019.     
Patient replied via [employer].  Patient was last seen by AE for some of her current symptoms.  Complex patient.    Huddling with AE.  Marce Chairez, RN, BSN    
Replied to patient via [employer]Â®.    Routed to AE as fyi of test results in patient's [employer]Â® message.  Marce Chairez, RN, BSN    
Replied to patient via mychart, per AE recs.    Marce Chairez, RN, BSN    
normal...

## 2021-02-22 ENCOUNTER — OFFICE VISIT (OUTPATIENT)
Dept: FAMILY MEDICINE | Facility: OTHER | Age: 51
End: 2021-02-22
Payer: COMMERCIAL

## 2021-02-22 VITALS
SYSTOLIC BLOOD PRESSURE: 124 MMHG | RESPIRATION RATE: 14 BRPM | WEIGHT: 231 LBS | DIASTOLIC BLOOD PRESSURE: 72 MMHG | HEIGHT: 61 IN | TEMPERATURE: 98.9 F | BODY MASS INDEX: 43.61 KG/M2 | OXYGEN SATURATION: 96 % | HEART RATE: 88 BPM

## 2021-02-22 DIAGNOSIS — L65.9 HAIR LOSS: ICD-10-CM

## 2021-02-22 DIAGNOSIS — E55.9 VITAMIN D DEFICIENCY: ICD-10-CM

## 2021-02-22 DIAGNOSIS — R43.2 ABNORMAL SENSE OF TASTE: Primary | ICD-10-CM

## 2021-02-22 DIAGNOSIS — R35.0 URINARY FREQUENCY: ICD-10-CM

## 2021-02-22 DIAGNOSIS — R53.82 CHRONIC FATIGUE: ICD-10-CM

## 2021-02-22 DIAGNOSIS — E03.9 HYPOTHYROIDISM, UNSPECIFIED TYPE: ICD-10-CM

## 2021-02-22 DIAGNOSIS — D50.0 IRON DEFICIENCY ANEMIA DUE TO CHRONIC BLOOD LOSS: ICD-10-CM

## 2021-02-22 DIAGNOSIS — E53.1 VITAMIN B6 DEFICIENCY: ICD-10-CM

## 2021-02-22 LAB
ALBUMIN SERPL-MCNC: 4 G/DL (ref 3.4–5)
ALP SERPL-CCNC: 94 U/L (ref 40–150)
ALT SERPL W P-5'-P-CCNC: 45 U/L (ref 0–50)
ANION GAP SERPL CALCULATED.3IONS-SCNC: 3 MMOL/L (ref 3–14)
AST SERPL W P-5'-P-CCNC: 24 U/L (ref 0–45)
BILIRUB SERPL-MCNC: 0.4 MG/DL (ref 0.2–1.3)
BUN SERPL-MCNC: 13 MG/DL (ref 7–30)
CALCIUM SERPL-MCNC: 9.2 MG/DL (ref 8.5–10.1)
CHLORIDE SERPL-SCNC: 107 MMOL/L (ref 94–109)
CO2 SERPL-SCNC: 30 MMOL/L (ref 20–32)
CREAT SERPL-MCNC: 0.6 MG/DL (ref 0.52–1.04)
ERYTHROCYTE [DISTWIDTH] IN BLOOD BY AUTOMATED COUNT: 12.6 % (ref 10–15)
FERRITIN SERPL-MCNC: 946 NG/ML (ref 8–252)
GFR SERPL CREATININE-BSD FRML MDRD: >90 ML/MIN/{1.73_M2}
GLUCOSE SERPL-MCNC: 157 MG/DL (ref 70–99)
HCT VFR BLD AUTO: 41.3 % (ref 35–47)
HGB BLD-MCNC: 13.7 G/DL (ref 11.7–15.7)
MCH RBC QN AUTO: 30.3 PG (ref 26.5–33)
MCHC RBC AUTO-ENTMCNC: 33.2 G/DL (ref 31.5–36.5)
MCV RBC AUTO: 91 FL (ref 78–100)
PLATELET # BLD AUTO: 252 10E9/L (ref 150–450)
POTASSIUM SERPL-SCNC: 3.6 MMOL/L (ref 3.4–5.3)
PROT SERPL-MCNC: 6.8 G/DL (ref 6.8–8.8)
RBC # BLD AUTO: 4.52 10E12/L (ref 3.8–5.2)
SODIUM SERPL-SCNC: 140 MMOL/L (ref 133–144)
TSH SERPL DL<=0.005 MIU/L-ACNC: 0.89 MU/L (ref 0.4–4)
WBC # BLD AUTO: 7.7 10E9/L (ref 4–11)

## 2021-02-22 PROCEDURE — 99000 SPECIMEN HANDLING OFFICE-LAB: CPT | Performed by: FAMILY MEDICINE

## 2021-02-22 PROCEDURE — 99214 OFFICE O/P EST MOD 30 MIN: CPT | Performed by: FAMILY MEDICINE

## 2021-02-22 PROCEDURE — 80053 COMPREHEN METABOLIC PANEL: CPT | Performed by: FAMILY MEDICINE

## 2021-02-22 PROCEDURE — 86769 SARS-COV-2 COVID-19 ANTIBODY: CPT | Performed by: FAMILY MEDICINE

## 2021-02-22 PROCEDURE — 84207 ASSAY OF VITAMIN B-6: CPT | Mod: 90 | Performed by: FAMILY MEDICINE

## 2021-02-22 PROCEDURE — 82306 VITAMIN D 25 HYDROXY: CPT | Performed by: FAMILY MEDICINE

## 2021-02-22 PROCEDURE — 82728 ASSAY OF FERRITIN: CPT | Performed by: FAMILY MEDICINE

## 2021-02-22 PROCEDURE — 84443 ASSAY THYROID STIM HORMONE: CPT | Performed by: FAMILY MEDICINE

## 2021-02-22 PROCEDURE — 85027 COMPLETE CBC AUTOMATED: CPT | Performed by: FAMILY MEDICINE

## 2021-02-22 PROCEDURE — 36415 COLL VENOUS BLD VENIPUNCTURE: CPT | Performed by: FAMILY MEDICINE

## 2021-02-22 ASSESSMENT — ANXIETY QUESTIONNAIRES
4. TROUBLE RELAXING: NOT AT ALL
GAD7 TOTAL SCORE: 0
7. FEELING AFRAID AS IF SOMETHING AWFUL MIGHT HAPPEN: NOT AT ALL
7. FEELING AFRAID AS IF SOMETHING AWFUL MIGHT HAPPEN: NOT AT ALL
2. NOT BEING ABLE TO STOP OR CONTROL WORRYING: NOT AT ALL
6. BECOMING EASILY ANNOYED OR IRRITABLE: NOT AT ALL
1. FEELING NERVOUS, ANXIOUS, OR ON EDGE: NOT AT ALL
5. BEING SO RESTLESS THAT IT IS HARD TO SIT STILL: NOT AT ALL
3. WORRYING TOO MUCH ABOUT DIFFERENT THINGS: NOT AT ALL
GAD7 TOTAL SCORE: 0
GAD7 TOTAL SCORE: 0

## 2021-02-22 ASSESSMENT — MIFFLIN-ST. JEOR: SCORE: 1610.57

## 2021-02-22 ASSESSMENT — PAIN SCALES - GENERAL: PAINLEVEL: NO PAIN (0)

## 2021-02-22 ASSESSMENT — PATIENT HEALTH QUESTIONNAIRE - PHQ9
10. IF YOU CHECKED OFF ANY PROBLEMS, HOW DIFFICULT HAVE THESE PROBLEMS MADE IT FOR YOU TO DO YOUR WORK, TAKE CARE OF THINGS AT HOME, OR GET ALONG WITH OTHER PEOPLE: NOT DIFFICULT AT ALL
SUM OF ALL RESPONSES TO PHQ QUESTIONS 1-9: 0
SUM OF ALL RESPONSES TO PHQ QUESTIONS 1-9: 0

## 2021-02-22 NOTE — PROGRESS NOTES
"    Assessment & Plan       ICD-10-CM    1. Abnormal sense of taste  R43.2 Comprehensive metabolic panel (BMP + Alb, Alk Phos, ALT, AST, Total. Bili, TP)     COVID-19 Virus (Coronavirus) Antibody Screen, IgG     COVID-19 Virus (Coronavirus) Antibody Screen, IgG     *UA reflex to Microscopic and Culture ([facility] (except [place] and [place])     CANCELED: *UA reflex to Microscopic and Culture ([facility] (except [place] and [place])   2. Vitamin B6 deficiency  E53.1 Vitamin B6   3. Chronic fatigue  R53.82 CBC with platelets     Vitamin B6     TSH with free T4 reflex     Ferritin     COVID-19 Virus (Coronavirus) Antibody Screen, IgG     COVID-19 Virus (Coronavirus) Antibody Screen, IgG   4. Hypothyroidism, unspecified type  E03.9 TSH with free T4 reflex   5. Hair loss  L65.9 CBC with platelets     Vitamin D Deficiency     Vitamin B6     TSH with free T4 reflex   6. Urinary frequency  R35.0 *UA reflex to Microscopic and Culture ([facility] (except [place] and [place])     CANCELED: *UA reflex to Microscopic and Culture ([facility] (except [place] and [place])   7. Vitamin D deficiency  E55.9 Vitamin D Deficiency   8. Iron deficiency anemia due to chronic blood loss  D50.0 Ferritin      Has several chronic issues that increased her risk for fatigue. LAbs to determine source, but her pituitary adenoma appears resolved. Describes symptoms that sound like dehydration. Was unable to leave urine while here likely due to dehydration.    Review of the result(s) of each unique test - tsh, cbc, ua  33 minutes spent on the date of the encounter doing chart review, history and exam, documentation and further activities as noted above       BMI:   Estimated body mass index is 43.61 kg/m  as calculated from the following:    Height as of this encounter: 1.55 m (5' 1.02\").    Weight as of this encounter: 104.8 kg (231 lb).   Weight management plan: " "Discussed healthy diet and exercise guidelines        No follow-ups on file.    Brittanie Elkins MD, MD  [facility] JUAN Quintanilla is a 50 year old who presents for the following health issues     History of Present Illness       She eats 0-1 servings of fruits and vegetables daily.She consumes 0 sweetened beverage(s) daily.She exercises with enough effort to increase her heart rate 9 or less minutes per day.  She exercises with enough effort to increase her heart rate 3 or less days per week.   She is taking medications regularly.     Medication Followup of Vitamin D    Taking Medication as prescribed: YES    Side Effects:  None    Medication Helping Symptoms:  yes       Medication Followup of B6    Taking Medication as prescribed: YES- ran out     Side Effects:  None    Medication Helping Symptoms:  yes     PAtient with fatigue and weird taste in her mouth. Had increase in hair loss in the last few months. Is 11 months since last LMP.    Review of Systems   Constitutional, HEENT, cardiovascular, pulmonary, GI, , musculoskeletal, neuro, skin, endocrine and psych systems are negative, except as otherwise noted.      Objective    /72   Pulse 88   Temp 98.9  F (37.2  C) (Temporal)   Resp 14   Ht 1.55 m (5' 1.02\")   Wt 104.8 kg (231 lb)   LMP 03/22/2020   SpO2 96%   BMI 43.61 kg/m    Body mass index is 43.61 kg/m .  Physical Exam   GENERAL: healthy, alert and no distress  RESP: lungs clear to auscultation - no rales, rhonchi or wheezes  CV: regular rate and rhythm, normal S1 S2, no S3 or S4, no murmur, click or rub, no peripheral edema and peripheral pulses strong  ABDOMEN: soft, nontender, no hepatosplenomegaly, no masses and bowel sounds normal  MS: no gross musculoskeletal defects noted, no edema  SKIN: no suspicious lesions or rashes  NEURO: Normal strength and tone, mentation intact and speech normal  PSYCH: mentation appears normal, affect normal/bright                "

## 2021-02-23 LAB
DEPRECATED CALCIDIOL+CALCIFEROL SERPL-MC: 30 UG/L (ref 20–75)
LAB TEST METHOD: NORMAL
SARS-COV-2 AB PNL SERPL IA: NEGATIVE

## 2021-02-23 ASSESSMENT — ANXIETY QUESTIONNAIRES: GAD7 TOTAL SCORE: 0

## 2021-02-23 ASSESSMENT — PATIENT HEALTH QUESTIONNAIRE - PHQ9: SUM OF ALL RESPONSES TO PHQ QUESTIONS 1-9: 0

## 2021-02-26 LAB — VIT B6 SERPL-MCNC: 29.5 NMOL/L (ref 20–125)

## 2021-03-24 DIAGNOSIS — G43.009 MIGRAINE WITHOUT AURA AND WITHOUT STATUS MIGRAINOSUS, NOT INTRACTABLE: ICD-10-CM

## 2021-03-24 RX ORDER — NAPROXEN 500 MG/1
TABLET ORAL
Qty: 15 TABLET | Refills: 3 | Status: SHIPPED | OUTPATIENT
Start: 2021-03-24 | End: 2021-06-02

## 2021-03-24 NOTE — TELEPHONE ENCOUNTER
Rx Authorization:    Requested Medication/ Dose NAPROXEN 500MG TABLETS    Date last refill ordered: 3/3/2021    Quantity ordered: 15 tabs    # refills: 3    Date of last clinic visit with ordering provider:2/4/2021    Date of next clinic visit with ordering provider:     All pertinent protocol data (lab date/result):     Include pertinent information from patients message:

## 2021-04-11 ENCOUNTER — HEALTH MAINTENANCE LETTER (OUTPATIENT)
Age: 51
End: 2021-04-11

## 2021-04-27 NOTE — PROGRESS NOTES
Assessment & Plan       ICD-10-CM    1. Suspected COVID-19 virus infection  Z20.822 Symptomatic COVID-19 Virus (Coronavirus) by PCR     Symptomatic COVID-19 Virus (Coronavirus) by PCR   2. Toe infection  L08.9 sulfamethoxazole-trimethoprim (BACTRIM DS) 800-160 MG tablet     fluconazole (DIFLUCAN) 150 MG tablet   3. Morbid obesity (H)  E66.01       Patient had toe infection develop after a trip where she had a pedicure.  Several of her toenails had inflammation redness and she noted pus along the toenail line.  She did receive a course of Keflex which significantly reduce the inflammation in this area.  Though she has not seen complete resolution in one area of her right great toe.  Will change to Bactrim for this information.  Add Diflucan for probable yeast infection after antibiotic use.  And discussed her viral symptoms with now fever as unlikely to be related to the minimal infection of her toe and completed Covid test today.    Review of the result(s) of each unique test - covid  12 minutes spent on the date of the encounter doing chart review, history and exam, documentation and further activities per the note         Return in about 2 weeks (around 5/12/2021) for if not improved.    Brittanie Elkins MD, MD  [facility]    Gena Quintanilla is a 50 year old who presents for the following health issues     HPI     Right big toe - started on 13th went to urgent care red inflamed swollen started antibiotics (keflex)  for 1 week but they didn't work.       Acute Illness- went [place] of [place] for dying cousin: covid test 3 days into being home   Acute illness concerns: acute illness   Onset/Duration: 15 days   Symptoms:  Fever: YES  Chills/Sweats: YES  Headache (location?): YES  Sinus Pressure: YES  Conjunctivitis:  no  Ear Pain: no  Rhinorrhea: YES  Congestion: YES  Sore Throat: YES  Cough: YES-non-productive  Wheeze: no  Decreased Appetite: no  Nausea: no  Vomiting: no  Diarrhea:  no  Dysuria/Freq.: no  Dysuria or Hematuria: YES  Fatigue/Achiness: YES  Sick/Strep Exposure: at hospital and travel   Therapies tried and outcome: None      Review of Systems   Constitutional, HEENT, cardiovascular, pulmonary, GI, , musculoskeletal, neuro, skin, endocrine and psych systems are negative, except as otherwise noted.      Objective    /70   Pulse 96   Temp 97.4  F (36.3  C)   Resp 24   SpO2 96%   There is no height or weight on file to calculate BMI.  Physical Exam   GENERAL: healthy, alert and no distress  EYES: Eyes grossly normal to inspection, PERRL and conjunctivae and sclerae normal  HENT: ear canals and TM's normal, nose with congestion and mouth without ulcers or lesions  NECK: no adenopathy, no asymmetry, masses, or scars and thyroid normal to palpation  RESP: lungs clear to auscultation - no rales, rhonchi or wheezes  CV: regular rate and rhythm, normal S1 S2, no S3 or S4, no murmur, click or rub, no peripheral edema and peripheral pulses strong  ABDOMEN: soft, nontender, no hepatosplenomegaly, no masses and bowel sounds normal  MS: no gross musculoskeletal defects noted, no edema  MS: R great toe inner side with small minimally pus filled pocket noted and no notable amount of fluid could be expelled today during visit.  SKIN: no suspicious lesions or rashes  NEURO: Normal strength and tone, mentation intact and speech normal  PSYCH: mentation appears normal, affect normal/bright

## 2021-04-28 ENCOUNTER — OFFICE VISIT (OUTPATIENT)
Dept: FAMILY MEDICINE | Facility: OTHER | Age: 51
End: 2021-04-28
Payer: COMMERCIAL

## 2021-04-28 VITALS
HEART RATE: 96 BPM | SYSTOLIC BLOOD PRESSURE: 132 MMHG | DIASTOLIC BLOOD PRESSURE: 70 MMHG | RESPIRATION RATE: 24 BRPM | OXYGEN SATURATION: 96 % | TEMPERATURE: 97.4 F

## 2021-04-28 DIAGNOSIS — E66.01 MORBID OBESITY (H): ICD-10-CM

## 2021-04-28 DIAGNOSIS — L08.9 TOE INFECTION: ICD-10-CM

## 2021-04-28 DIAGNOSIS — Z20.822 SUSPECTED COVID-19 VIRUS INFECTION: Primary | ICD-10-CM

## 2021-04-28 LAB
SARS-COV-2 RNA RESP QL NAA+PROBE: NORMAL
SPECIMEN SOURCE: NORMAL

## 2021-04-28 PROCEDURE — U0005 INFEC AGEN DETEC AMPLI PROBE: HCPCS | Performed by: FAMILY MEDICINE

## 2021-04-28 PROCEDURE — 99213 OFFICE O/P EST LOW 20 MIN: CPT | Performed by: FAMILY MEDICINE

## 2021-04-28 PROCEDURE — U0003 INFECTIOUS AGENT DETECTION BY NUCLEIC ACID (DNA OR RNA); SEVERE ACUTE RESPIRATORY SYNDROME CORONAVIRUS 2 (SARS-COV-2) (CORONAVIRUS DISEASE [COVID-19]), AMPLIFIED PROBE TECHNIQUE, MAKING USE OF HIGH THROUGHPUT TECHNOLOGIES AS DESCRIBED BY CMS-2020-01-R: HCPCS | Performed by: FAMILY MEDICINE

## 2021-04-28 RX ORDER — FLUCONAZOLE 150 MG/1
150 TABLET ORAL ONCE
Qty: 1 TABLET | Refills: 0 | Status: SHIPPED | OUTPATIENT
Start: 2021-04-28 | End: 2021-04-28

## 2021-04-28 RX ORDER — SULFAMETHOXAZOLE/TRIMETHOPRIM 800-160 MG
1 TABLET ORAL 2 TIMES DAILY
Qty: 10 TABLET | Refills: 0 | Status: SHIPPED | OUTPATIENT
Start: 2021-04-28 | End: 2021-05-03

## 2021-04-28 ASSESSMENT — PAIN SCALES - GENERAL: PAINLEVEL: MILD PAIN (3)

## 2021-04-29 LAB
LABORATORY COMMENT REPORT: NORMAL
SARS-COV-2 RNA RESP QL NAA+PROBE: NEGATIVE
SPECIMEN SOURCE: NORMAL

## 2021-05-13 DIAGNOSIS — Z12.31 VISIT FOR SCREENING MAMMOGRAM: ICD-10-CM

## 2021-05-13 PROCEDURE — 77063 BREAST TOMOSYNTHESIS BI: CPT | Mod: TC | Performed by: RADIOLOGY

## 2021-05-13 PROCEDURE — 77067 SCR MAMMO BI INCL CAD: CPT | Mod: TC | Performed by: RADIOLOGY

## 2021-05-29 DIAGNOSIS — G43.009 MIGRAINE WITHOUT AURA AND WITHOUT STATUS MIGRAINOSUS, NOT INTRACTABLE: ICD-10-CM

## 2021-06-01 NOTE — TELEPHONE ENCOUNTER
Rx Authorization:    Requested Medication/ Dose naproxen (NAPROSYN) 500 MG tablet    Date last refill ordered: 3/24/21    Quantity ordered: 15 Tablets    # refills: 3    Date of last clinic visit with ordering provider: 2/4/21    Date of next clinic visit with ordering provider:     All pertinent protocol data (lab date/result):     Include pertinent information from patients message:

## 2021-06-02 RX ORDER — NAPROXEN 500 MG/1
TABLET ORAL
Qty: 15 TABLET | Refills: 3 | Status: SHIPPED | OUTPATIENT
Start: 2021-06-02

## 2021-06-11 NOTE — PROGRESS NOTES
"Socorro Etienne arrived for dose 2 Injectafer infusion.  Socorro Etienne was educated on her plan of care. She reported that she felt \"funny, hard to describe\" after the first infusion. Each medication given today was reviewed prior to administration. Injectafer was infused over 32 minutes. Treatment was completed as ordered with no signs of reaction. IV site:peripheral IV patent throughout treatment with positive blood return obtained before and at completion. IV site with no pain, redness, swelling and drainage. IV site flushed with saline and discontinued. Socorro Etienne will call her doctor for a follow up appointment. Socorro Etienne was discharged to home. She discharged ambulatory per self at 1621. The after visit summary was given. She will contact her doctor if she has any problems.    Vernell Pham"

## 2021-06-11 NOTE — PROGRESS NOTES
Pt arrived ambulatory to clinic for first dose of Injectafer.  Provided written information about medication to pt.  Vernell started IV using aseptic technique without any difficulties with excellent blood return.  Administered medication per MD order, infusion went from 1433 to 1505.  Monitored pt for 30 mins post infusion.  Pt tolerated infusion well, no s/s of infusion reaction.  AVS given to pt.  IV was D/C'd using 2x2 and Coban.  Pt verbalized understanding of plan of care and return to clinic.

## 2021-08-05 ENCOUNTER — TRANSFERRED RECORDS (OUTPATIENT)
Dept: HEALTH INFORMATION MANAGEMENT | Facility: CLINIC | Age: 51
End: 2021-08-05

## 2021-08-30 ENCOUNTER — TRANSFERRED RECORDS (OUTPATIENT)
Dept: HEALTH INFORMATION MANAGEMENT | Facility: CLINIC | Age: 51
End: 2021-08-30

## 2021-09-17 ENCOUNTER — TRANSFERRED RECORDS (OUTPATIENT)
Dept: HEALTH INFORMATION MANAGEMENT | Facility: CLINIC | Age: 51
End: 2021-09-17

## 2021-09-17 NOTE — PROGRESS NOTES
"    Assessment & Plan       ICD-10-CM    1. Screening for hyperlipidemia  Z13.220       Patient along with the symptoms are all consistent with her hemochromatosis diagnosis as though she wanted to make sure that there is no other reason for her symptoms.  We discussed work-up for each of her fatigue and inflammatory symptoms had been done previously except her new elbow pain.  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  Given a right tennis elbow brace today and will directed to physical therapy for follow-up if needed.  Discussed modification of activities to reduce the stresses here.  We will need to do labs to see how hemochromatosis is affecting her as well as look for other reasons for fatigue again as she is previously had thyroid and anemia and vitamin D deficiency which all took contribute to her worsening fatigue that has been occurring since her phlebotomy treatments of the hemochromatosis.    Review of the result(s) of each unique test - cbc, tsh, vit d  70 minutes spent on the date of the encounter doing chart review, history and exam, documentation and further activities per the note       BMI:   Estimated body mass index is 41.75 kg/m  as calculated from the following:    Height as of this encounter: 1.553 m (5' 1.14\").    Weight as of this encounter: 100.7 kg (222 lb).   Weight management plan: Discussed healthy diet and exercise guidelines        No follow-ups on file.    Brittanie Elkins MD, MD  [facility]    Gena Quintanilla is a 51 year old who presents for the following health issues     History of Present Illness       She eats 0-1 servings of fruits and vegetables daily.She consumes 0 sweetened beverage(s) daily.She exercises with enough effort to increase her heart rate 10 to 19 minutes per day.  She exercises with enough effort to increase her heart rate 4 days per week.   She is taking medications regularly.   " "    Patient is here to discuss new diagnoses.   Has heterozygote H for hemachromatosis. And [facility] had been evaluating this and found that she has developed clinically active hemachromatosis due to the infusions and stress after the pituitary adenoma. Had increased insulin resistance and was started on Ozempic. She has some liver inflammation and scheduled with hepatology , scheduled in November. She has been doing weekly phlebotomies for treatment and has gotten excessively tired after the second  Had significant vomiting. Plans to recheck ferritin on 9/27 and had gone from 14 hg to 13 now.   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   Has symptoms now of weakness and achy joints with her elbows be the worst with sometimes feeling like it is   Low libido and extreme fatigue.        Review of Systems   Constitutional, HEENT, cardiovascular, pulmonary, GI, , musculoskeletal, neuro, skin, endocrine and psych systems are negative, except as otherwise noted.      Objective    /86   Pulse 95   Resp 18   Ht 1.553 m (5' 1.14\")   Wt 100.7 kg (222 lb)   LMP 03/20/2020   SpO2 96%   BMI 41.75 kg/m    Body mass index is 41.75 kg/m .  Physical Exam   GENERAL: healthy, alert and no distress  RESP: lungs clear to auscultation - no rales, rhonchi or wheezes  CV: regular rate and rhythm, normal S1 S2, no S3 or S4, no murmur, click or rub, no peripheral edema and peripheral pulses strong  ABDOMEN: soft, nontender, no hepatosplenomegaly, no masses and bowel sounds normal  MS: no gross musculoskeletal defects noted, no edema  SKIN: no suspicious lesions or rashes  NEURO: Normal strength and tone, mentation intact and speech normal  PSYCH: mentation appears normal, affect normal/bright                "

## 2021-09-20 ENCOUNTER — OFFICE VISIT (OUTPATIENT)
Dept: FAMILY MEDICINE | Facility: OTHER | Age: 51
End: 2021-09-20
Payer: COMMERCIAL

## 2021-09-20 VITALS
WEIGHT: 222 LBS | SYSTOLIC BLOOD PRESSURE: 124 MMHG | HEART RATE: 95 BPM | OXYGEN SATURATION: 96 % | HEIGHT: 61 IN | RESPIRATION RATE: 18 BRPM | BODY MASS INDEX: 41.91 KG/M2 | DIASTOLIC BLOOD PRESSURE: 86 MMHG

## 2021-09-20 DIAGNOSIS — E66.01 MORBID OBESITY (H): ICD-10-CM

## 2021-09-20 DIAGNOSIS — Z13.220 SCREENING FOR HYPERLIPIDEMIA: ICD-10-CM

## 2021-09-20 DIAGNOSIS — E55.9 VITAMIN D DEFICIENCY: ICD-10-CM

## 2021-09-20 DIAGNOSIS — E78.5 HYPERLIPIDEMIA LDL GOAL <130: ICD-10-CM

## 2021-09-20 DIAGNOSIS — E03.9 HYPOTHYROIDISM, UNSPECIFIED TYPE: ICD-10-CM

## 2021-09-20 DIAGNOSIS — E83.118 OTHER HEMOCHROMATOSIS: ICD-10-CM

## 2021-09-20 DIAGNOSIS — D50.0 IRON DEFICIENCY ANEMIA DUE TO CHRONIC BLOOD LOSS: ICD-10-CM

## 2021-09-20 DIAGNOSIS — E22.1 HYPERPROLACTINEMIA (H): ICD-10-CM

## 2021-09-20 DIAGNOSIS — M77.11 LATERAL EPICONDYLITIS OF BOTH ELBOWS: Primary | ICD-10-CM

## 2021-09-20 DIAGNOSIS — R73.09 ABNORMAL BLOOD SUGAR: ICD-10-CM

## 2021-09-20 DIAGNOSIS — K76.0 FATTY LIVER: ICD-10-CM

## 2021-09-20 DIAGNOSIS — M77.12 LATERAL EPICONDYLITIS OF BOTH ELBOWS: Primary | ICD-10-CM

## 2021-09-20 LAB
ALBUMIN SERPL-MCNC: 4.2 G/DL (ref 3.4–5)
ALP SERPL-CCNC: 85 U/L (ref 40–150)
ALT SERPL W P-5'-P-CCNC: 58 U/L (ref 0–50)
ANION GAP SERPL CALCULATED.3IONS-SCNC: 5 MMOL/L (ref 3–14)
AST SERPL W P-5'-P-CCNC: 27 U/L (ref 0–45)
BILIRUB SERPL-MCNC: 0.4 MG/DL (ref 0.2–1.3)
BUN SERPL-MCNC: 21 MG/DL (ref 7–30)
CALCIUM SERPL-MCNC: 8.8 MG/DL (ref 8.5–10.1)
CHLORIDE BLD-SCNC: 107 MMOL/L (ref 94–109)
CHOLEST SERPL-MCNC: 176 MG/DL
CO2 SERPL-SCNC: 29 MMOL/L (ref 20–32)
CREAT SERPL-MCNC: 0.76 MG/DL (ref 0.52–1.04)
DEPRECATED CALCIDIOL+CALCIFEROL SERPL-MC: 37 UG/L (ref 20–75)
FASTING STATUS PATIENT QL REPORTED: YES
GFR SERPL CREATININE-BSD FRML MDRD: >90 ML/MIN/1.73M2
GLUCOSE BLD-MCNC: 98 MG/DL (ref 70–99)
HBA1C MFR BLD: 5.2 % (ref 0–5.6)
HDLC SERPL-MCNC: 28 MG/DL
LDLC SERPL CALC-MCNC: 100 MG/DL
NONHDLC SERPL-MCNC: 148 MG/DL
POTASSIUM BLD-SCNC: 4.2 MMOL/L (ref 3.4–5.3)
PROLACTIN SERPL-MCNC: 9 UG/L (ref 3–27)
PROT SERPL-MCNC: 6.9 G/DL (ref 6.8–8.8)
SODIUM SERPL-SCNC: 141 MMOL/L (ref 133–144)
TRIGL SERPL-MCNC: 242 MG/DL
TSH SERPL DL<=0.005 MIU/L-ACNC: 1.12 MU/L (ref 0.4–4)

## 2021-09-20 PROCEDURE — 36415 COLL VENOUS BLD VENIPUNCTURE: CPT | Performed by: FAMILY MEDICINE

## 2021-09-20 PROCEDURE — 84146 ASSAY OF PROLACTIN: CPT | Performed by: FAMILY MEDICINE

## 2021-09-20 PROCEDURE — 99215 OFFICE O/P EST HI 40 MIN: CPT | Performed by: FAMILY MEDICINE

## 2021-09-20 PROCEDURE — 80061 LIPID PANEL: CPT | Performed by: FAMILY MEDICINE

## 2021-09-20 PROCEDURE — 99417 PROLNG OP E/M EACH 15 MIN: CPT | Performed by: FAMILY MEDICINE

## 2021-09-20 PROCEDURE — 82306 VITAMIN D 25 HYDROXY: CPT | Performed by: FAMILY MEDICINE

## 2021-09-20 PROCEDURE — 84443 ASSAY THYROID STIM HORMONE: CPT | Performed by: FAMILY MEDICINE

## 2021-09-20 PROCEDURE — 83036 HEMOGLOBIN GLYCOSYLATED A1C: CPT | Performed by: FAMILY MEDICINE

## 2021-09-20 PROCEDURE — 80053 COMPREHEN METABOLIC PANEL: CPT | Performed by: FAMILY MEDICINE

## 2021-09-20 RX ORDER — SEMAGLUTIDE 1.34 MG/ML
2 INJECTION, SOLUTION SUBCUTANEOUS WEEKLY
COMMUNITY
Start: 2021-09-13 | End: 2023-07-20

## 2021-09-20 RX ORDER — FLURBIPROFEN SODIUM 0.3 MG/ML
SOLUTION/ DROPS OPHTHALMIC
COMMUNITY
Start: 2021-08-10 | End: 2024-08-16

## 2021-09-20 RX ORDER — OMEPRAZOLE 40 MG/1
40 CAPSULE, DELAYED RELEASE ORAL DAILY
COMMUNITY
Start: 2021-09-15

## 2021-09-20 ASSESSMENT — MIFFLIN-ST. JEOR: SCORE: 1566.62

## 2021-09-20 ASSESSMENT — PAIN SCALES - GENERAL: PAINLEVEL: NO PAIN (0)

## 2021-09-20 NOTE — PATIENT INSTRUCTIONS
Patient Education     Tennis Elbow  Muscles connect to bones by thick, fibrous cords (tendons). When the muscles are overused by repeated motion, the tendons may become inflamed and painful. This condition is called tendonitis.   Tennis elbow (lateral epicondylitis) is a form of tendonitis. It occurs when the forearm muscles are used again and again in a twisting motion. Pain from tennis elbow occurs mainly on the outside of the elbow. But the pain can spread into the forearm and wrist. Your elbow may also be swollen and tender to the touch.   The pain may get worse when you move your arm or do certain activities. Bending your wrist back, shaking hands, or turning a doorknob may cause pain. The pain often gets worse after several weeks or months. Sometimes you may feel pain when your arm is still.   Tennis players who use a backhand stroke with poor technique are more likely to get tennis elbow. But playing tennis is only one cause of tennis elbow. Other common activities that can cause it include:     Hammering    Painting    Raking  Besides tennis players, people at risk include , gardeners, musicians, and dentists. Sometimes people get tennis elbow without doing anything that would cause the injury.   Treatment includes resting the arm and taking anti-inflammatory medicines. Special splints can help ease symptoms. Symptoms should get better after 4 to 6 weeks of rest. You may need steroid injections if resting and using a splint don t help. After the pain is relieved, you should change your activities so the symptoms don t return. You may need physical therapy. It may include stretching, range-of-motion, and strengthening exercises. These treatments help most cases. You may need surgery if your symptoms continue for 6 months despite treatment.   Home care  Follow these guidelines when caring for yourself at home:    Rest your elbow as needed. Protect it from movement that causes pain. You may be told to  use a forearm splint at night to ease symptoms in the morning. Your healthcare provider may recommend a special wrap or splint to compress the muscles of the forearm. This can ease pain during daytime activities. As your symptoms get better, start to move your elbow more.    Put an ice pack on the injured area. Do this for 20 minutes every 1 to 2 hours the first day for pain relief. You can make an ice pack by wrapping a plastic bag of ice cubes in a thin towel. Continue using the ice pack 3 to 4 times a day for the next several days. Then use the ice pack as needed to ease pain and swelling.    You may use acetaminophen or ibuprofen to control pain, unless another pain medicine was prescribed. If you have chronic liver or kidney disease, talk with your healthcare provider before using these medicines. Also talk with your provider if you ve had a stomach ulcer or gastrointestinal bleeding.    After your elbow heals, avoid the motion that caused your pain. Or learn to move in a way that causes less stress on the tendon. Using a forearm wrap may keep tennis elbow from happening again.    A tennis elbow strap may ease pain and keep you from further injury when you start playing tennis again. You can also lower your risk for injury by warming up before you play and cooling down afterward. You should also use the right equipment. For instance, make sure your racquet has the right  and is the right size for you.  Follow-up care  Follow up with your healthcare provider as advised, if your symptoms don t get better after 2 to 3 weeks of treatment.   When to seek medical advice  Call your healthcare provider right away if any of these occur:    Redness over the painful area    Pain, stiffness,  or swelling at the elbow gets worse    Any numbness or tingling in your arm, hands, or fingers    Unexplained fever over 100.4 F (38 C)    [employer] last reviewed this educational content on 12/1/2019 2000-2021 [employer]  [employer]. All rights reserved. This information is not intended as a substitute for professional medical care. Always follow your healthcare professional's instructions.

## 2021-09-26 ENCOUNTER — HEALTH MAINTENANCE LETTER (OUTPATIENT)
Age: 51
End: 2021-09-26

## 2021-10-28 ENCOUNTER — MYC MEDICAL ADVICE (OUTPATIENT)
Dept: FAMILY MEDICINE | Facility: OTHER | Age: 51
End: 2021-10-28

## 2021-11-01 ENCOUNTER — MYC MEDICAL ADVICE (OUTPATIENT)
Dept: FAMILY MEDICINE | Facility: OTHER | Age: 51
End: 2021-11-01

## 2021-11-01 DIAGNOSIS — U07.1 INFECTION DUE TO 2019 NOVEL CORONAVIRUS: Primary | ICD-10-CM

## 2021-11-01 RX ORDER — CODEINE PHOSPHATE AND GUAIFENESIN 10; 100 MG/5ML; MG/5ML
1-2 SOLUTION ORAL EVERY 4 HOURS PRN
Qty: 180 ML | Refills: 0 | Status: SHIPPED | OUTPATIENT
Start: 2021-11-01 | End: 2022-03-23

## 2021-11-01 NOTE — TELEPHONE ENCOUNTER
, please advise prescribing a cough syrup.    Shelby Tariq, FELIPEN, RN, PHN  [place] [facility]  November 1, 2021

## 2021-11-01 NOTE — TELEPHONE ENCOUNTER
I can try a cough suppressant, though she should likely get in contact with [facility] carlyle if she is not already  Brittanie Elkins MD

## 2021-12-16 ENCOUNTER — ANCILLARY PROCEDURE (OUTPATIENT)
Dept: MRI IMAGING | Facility: CLINIC | Age: 51
End: 2021-12-16
Attending: SPECIALIST
Payer: COMMERCIAL

## 2021-12-16 DIAGNOSIS — E23.7 PITUITARY DISEASE (H): ICD-10-CM

## 2021-12-16 PROCEDURE — 70553 MRI BRAIN STEM W/O & W/DYE: CPT | Performed by: RADIOLOGY

## 2021-12-16 PROCEDURE — A9585 GADOBUTROL INJECTION: HCPCS | Performed by: RADIOLOGY

## 2021-12-16 RX ORDER — GADOBUTROL 604.72 MG/ML
10 INJECTION INTRAVENOUS ONCE
Status: COMPLETED | OUTPATIENT
Start: 2021-12-16 | End: 2021-12-16

## 2021-12-16 RX ADMIN — GADOBUTROL 10 ML: 604.72 INJECTION INTRAVENOUS at 10:18

## 2022-02-28 DIAGNOSIS — G43.009 MIGRAINE WITHOUT AURA AND WITHOUT STATUS MIGRAINOSUS, NOT INTRACTABLE: ICD-10-CM

## 2022-02-28 RX ORDER — PROPRANOLOL HCL 60 MG
60 CAPSULE, EXTENDED RELEASE 24HR ORAL DAILY
Qty: 90 CAPSULE | Refills: 0 | Status: SHIPPED | OUTPATIENT
Start: 2022-02-28 | End: 2022-05-31

## 2022-02-28 NOTE — TELEPHONE ENCOUNTER
Writer received a refill request from: [facility] in [place].   Pending Prescriptions:                       Disp   Refills    propranolol ER (INDERAL LA) 60 MG 24 hr c*90 cap*3            Sig: Take 1 capsule (60 mg) by mouth daily      Pt's last office visit: 2/4/2021  Next scheduled office visit: 4/20/2022      Per the RN/LPN medication refill protocol, writer is unable to refill this request.   Martin Fitzpatrick MA on 2/28/2022 at 10:06 AM

## 2022-03-23 ENCOUNTER — OFFICE VISIT (OUTPATIENT)
Dept: FAMILY MEDICINE | Facility: OTHER | Age: 52
End: 2022-03-23
Payer: COMMERCIAL

## 2022-03-23 VITALS
OXYGEN SATURATION: 98 % | HEART RATE: 85 BPM | RESPIRATION RATE: 16 BRPM | HEIGHT: 61 IN | BODY MASS INDEX: 38.39 KG/M2 | SYSTOLIC BLOOD PRESSURE: 128 MMHG | TEMPERATURE: 98.4 F | DIASTOLIC BLOOD PRESSURE: 76 MMHG | WEIGHT: 203.31 LBS

## 2022-03-23 DIAGNOSIS — E66.01 MORBID OBESITY (H): ICD-10-CM

## 2022-03-23 DIAGNOSIS — D35.2 PITUITARY MICROADENOMA WITH HYPERPROLACTINEMIA (H): ICD-10-CM

## 2022-03-23 DIAGNOSIS — E53.1 VITAMIN B6 DEFICIENCY: ICD-10-CM

## 2022-03-23 DIAGNOSIS — Z86.59 HISTORY OF DEPRESSION: ICD-10-CM

## 2022-03-23 DIAGNOSIS — K76.0 FATTY LIVER: ICD-10-CM

## 2022-03-23 DIAGNOSIS — E55.9 VITAMIN D DEFICIENCY: ICD-10-CM

## 2022-03-23 DIAGNOSIS — E83.118 SECONDARY HEMOCHROMATOSIS: ICD-10-CM

## 2022-03-23 DIAGNOSIS — D50.0 IRON DEFICIENCY ANEMIA DUE TO CHRONIC BLOOD LOSS: ICD-10-CM

## 2022-03-23 DIAGNOSIS — E22.9 PITUITARY MICROADENOMA WITH HYPERPROLACTINEMIA (H): ICD-10-CM

## 2022-03-23 DIAGNOSIS — E03.9 HYPOTHYROIDISM, UNSPECIFIED TYPE: ICD-10-CM

## 2022-03-23 DIAGNOSIS — E22.1 HYPERPROLACTINEMIA (H): Primary | ICD-10-CM

## 2022-03-23 LAB
ALBUMIN SERPL-MCNC: 4.2 G/DL (ref 3.4–5)
ALP SERPL-CCNC: 88 U/L (ref 40–150)
ALT SERPL W P-5'-P-CCNC: 25 U/L (ref 0–50)
ANION GAP SERPL CALCULATED.3IONS-SCNC: 3 MMOL/L (ref 3–14)
AST SERPL W P-5'-P-CCNC: 11 U/L (ref 0–45)
BILIRUB SERPL-MCNC: 0.4 MG/DL (ref 0.2–1.3)
BUN SERPL-MCNC: 14 MG/DL (ref 7–30)
CALCIUM SERPL-MCNC: 9.3 MG/DL (ref 8.5–10.1)
CHLORIDE BLD-SCNC: 104 MMOL/L (ref 94–109)
CO2 SERPL-SCNC: 32 MMOL/L (ref 20–32)
CREAT SERPL-MCNC: 0.6 MG/DL (ref 0.52–1.04)
ERYTHROCYTE [DISTWIDTH] IN BLOOD BY AUTOMATED COUNT: 15.3 % (ref 10–15)
FERRITIN SERPL-MCNC: 320 NG/ML (ref 8–252)
GFR SERPL CREATININE-BSD FRML MDRD: >90 ML/MIN/1.73M2
GLUCOSE BLD-MCNC: 89 MG/DL (ref 70–99)
HCT VFR BLD AUTO: 41.9 % (ref 35–47)
HGB BLD-MCNC: 13.3 G/DL (ref 11.7–15.7)
IRON SATN MFR SERPL: 14 % (ref 15–46)
IRON SERPL-MCNC: 43 UG/DL (ref 35–180)
MCH RBC QN AUTO: 27.7 PG (ref 26.5–33)
MCHC RBC AUTO-ENTMCNC: 31.7 G/DL (ref 31.5–36.5)
MCV RBC AUTO: 87 FL (ref 78–100)
PLATELET # BLD AUTO: 293 10E3/UL (ref 150–450)
POTASSIUM BLD-SCNC: 4.7 MMOL/L (ref 3.4–5.3)
PROLACTIN SERPL-MCNC: 8 UG/L (ref 3–27)
PROT SERPL-MCNC: 7.3 G/DL (ref 6.8–8.8)
RBC # BLD AUTO: 4.8 10E6/UL (ref 3.8–5.2)
SODIUM SERPL-SCNC: 139 MMOL/L (ref 133–144)
TIBC SERPL-MCNC: 316 UG/DL (ref 240–430)
TSH SERPL DL<=0.005 MIU/L-ACNC: 1.14 MU/L (ref 0.4–4)
VIT B12 SERPL-MCNC: 226 PG/ML (ref 193–986)
WBC # BLD AUTO: 9 10E3/UL (ref 4–11)

## 2022-03-23 PROCEDURE — 80053 COMPREHEN METABOLIC PANEL: CPT | Performed by: FAMILY MEDICINE

## 2022-03-23 PROCEDURE — 82306 VITAMIN D 25 HYDROXY: CPT | Performed by: FAMILY MEDICINE

## 2022-03-23 PROCEDURE — 82728 ASSAY OF FERRITIN: CPT | Performed by: FAMILY MEDICINE

## 2022-03-23 PROCEDURE — 84207 ASSAY OF VITAMIN B-6: CPT | Mod: 90 | Performed by: FAMILY MEDICINE

## 2022-03-23 PROCEDURE — 36415 COLL VENOUS BLD VENIPUNCTURE: CPT | Performed by: FAMILY MEDICINE

## 2022-03-23 PROCEDURE — 85027 COMPLETE CBC AUTOMATED: CPT | Performed by: FAMILY MEDICINE

## 2022-03-23 PROCEDURE — 84443 ASSAY THYROID STIM HORMONE: CPT | Performed by: FAMILY MEDICINE

## 2022-03-23 PROCEDURE — 99000 SPECIMEN HANDLING OFFICE-LAB: CPT | Performed by: FAMILY MEDICINE

## 2022-03-23 PROCEDURE — 84146 ASSAY OF PROLACTIN: CPT | Performed by: FAMILY MEDICINE

## 2022-03-23 PROCEDURE — 83550 IRON BINDING TEST: CPT | Performed by: FAMILY MEDICINE

## 2022-03-23 PROCEDURE — 99214 OFFICE O/P EST MOD 30 MIN: CPT | Performed by: FAMILY MEDICINE

## 2022-03-23 PROCEDURE — 82607 VITAMIN B-12: CPT | Performed by: FAMILY MEDICINE

## 2022-03-23 RX ORDER — CELECOXIB 200 MG/1
CAPSULE ORAL
COMMUNITY
Start: 2022-03-07 | End: 2023-01-19

## 2022-03-23 NOTE — PROGRESS NOTES
Assessment & Plan       ICD-10-CM    1. Hyperprolactinemia (H)  E22.1 Prolactin     Prolactin   2. History of depression  Z86.59    3. Pituitary microadenoma with hyperprolactinemia (H)  D35.2     E22.9    4. Iron deficiency anemia due to chronic blood loss  D50.0 Iron and iron binding capacity     Ferritin     CBC with platelets     Vitamin B12     Vitamin B12     CBC with platelets     Ferritin     Iron and iron binding capacity   5. Cervical cancer screening  Z12.4    6. Morbid obesity (H)  E66.01    7. Secondary hemochromatosis  E83.118 Comprehensive metabolic panel (BMP + Alb, Alk Phos, ALT, AST, Total. Bili, TP)     Comprehensive metabolic panel (BMP + Alb, Alk Phos, ALT, AST, Total. Bili, TP)   8. Fatty liver  K76.0 Comprehensive metabolic panel (BMP + Alb, Alk Phos, ALT, AST, Total. Bili, TP)     Comprehensive metabolic panel (BMP + Alb, Alk Phos, ALT, AST, Total. Bili, TP)   9. Hypothyroidism, unspecified type  E03.9 TSH with free T4 reflex     TSH with free T4 reflex   10. Vitamin D deficiency  E55.9 Vitamin D Deficiency     Vitamin D Deficiency   11. Vitamin B6 deficiency  E53.1 Vitamin B6     Vitamin B6     Patient has long history of iron deficiency anemia and was receiving iron replacement though after she stopped having her menstrual cycles she found her ferritin levels were rising and was diagnosed with secondary hemochromatosis.  She had to do a number of phlebotomies in treatment and since has had trouble with her hair falling out and fatigue worsening.  She has not had her thyroid rechecked since the phlebotomies as well as multiple vitamin and liver function testing.  We offered to have some labs to try to clarify as much as possible of how else she may have been affected from the phlebotomy treatment and catch up on how her levels are at this time for her previous diagnoses.  Patient also was started on Ozempic just prior to this and has not had any follow-up labs in the though she does not  "state any abdominal pain she does have some discomfort in her neck that she cannot clearly state is reflux-like but did sound to be.  Discussed foods she is eating and reduction of carbs are important with her hemochromatosis but she needs to take herbs at a time from Ozempic use to see if she can maximize her overall health without impact on the medication.    Review of the result(s) of each unique test - cbc, ferritin, iron, vit d, tsh, vit b6, b12, cmp  29 minutes spent on the date of the encounter doing chart review, history and exam, documentation and further activities per the note       BMI:   Estimated body mass index is 38.42 kg/m  as calculated from the following:    Height as of this encounter: 1.549 m (5' 1\").    Weight as of this encounter: 92.2 kg (203 lb 5 oz).   Weight management plan: Discussed healthy diet and exercise guidelines    No follow-ups on file.    Brittanie Elkins MD, MD  [facility] JOSE Quintanilla is a 51 year old who presents for the following health issues     History of Present Illness       Hyperlipidemia:  She presents for follow up of hyperlipidemia.  She is not taking medication to lower cholesterol. She is not having myalgia or other side effects to statin medications.    Hypertension: She presents for follow up of hypertension.  She does not check blood pressure  regularly outside of the clinic. Outpatient blood pressures have not been over 140/90. She follows a low salt diet.     Hypothyroidism:     Since last visit, patient describes the following symptoms::  Constipation, Fatigue, Hair loss and Loose stools    Migraines:   Since the patient's last clinic visit, headaches are: no change  The patient is getting headaches:  1-2 times per mo  She is able to do normal daily activities when she has a migraine.  The patient is taking the following rescue/relief medications:  Naproxyn (Aleve) and other   Patient states \"I get total relief\" from the " "rescue/relief medications.   The patient is taking the following medications to prevent migraines:  Other  In the past 4 weeks, the patient has gone to an Urgent Care or Emergency Room 0 times times due to headaches.    Reason for visit:  Iron, thyroid, vit B, D, and lump in throat area  Symptom onset:  1-2 weeks ago  Symptoms include:  Extreme fatigue, concentration, significant hair loss & in the last 2-3 days I feel like I m swallowing but there is lump in my throat  Symptom intensity:  Moderate  Symptom progression:  Staying the same  Had these symptoms before:  Yes  Has tried/received treatment for these symptoms:  Yes  Previous treatment was successful:  Yes  Prior treatment description:  Meds, infusion  What makes it worse:  No treatment, consuming too much iron when levels are high  What makes it better:  Meds, infusion & phlebotomy    She eats 2-3 servings of fruits and vegetables daily.She consumes 2 sweetened beverage(s) daily.She exercises with enough effort to increase her heart rate 9 or less minutes per day.  She exercises with enough effort to increase her heart rate 3 or less days per week.   She is taking medications regularly.    Recent diagnosis of secondary hemochromatosis for which she has had 3 phlebotomies in the last 6 months to treat    Review of Systems   Constitutional, HEENT, cardiovascular, pulmonary, GI, , musculoskeletal, neuro, skin, endocrine and psych systems are negative, except as otherwise noted.      Objective    /76   Pulse 85   Temp 98.4  F (36.9  C)   Resp 16   Ht 1.549 m (5' 1\")   Wt 92.2 kg (203 lb 5 oz)   LMP 03/22/2020   SpO2 98%   Breastfeeding No   BMI 38.42 kg/m    Body mass index is 38.42 kg/m .  Physical Exam   GENERAL: healthy, alert and no distress  RESP: lungs clear to auscultation - no rales, rhonchi or wheezes  CV: regular rate and rhythm, normal S1 S2, no S3 or S4, no murmur, click or rub, no peripheral edema and peripheral pulses strong  MS: " no gross musculoskeletal defects noted, no edema  Hair: thin, but no new hair follicles or breaks noted  NEURO: Normal strength and tone, mentation intact and speech normal  PSYCH: mentation appears normal, affect normal/bright

## 2022-03-24 LAB — DEPRECATED CALCIDIOL+CALCIFEROL SERPL-MC: 30 UG/L (ref 20–75)

## 2022-03-30 LAB — PYRIDOXAL PHOS SERPL-SCNC: 36 NMOL/L

## 2022-04-20 ENCOUNTER — OFFICE VISIT (OUTPATIENT)
Dept: NEUROLOGY | Facility: CLINIC | Age: 52
End: 2022-04-20
Payer: COMMERCIAL

## 2022-04-20 VITALS
SYSTOLIC BLOOD PRESSURE: 140 MMHG | BODY MASS INDEX: 37.57 KG/M2 | WEIGHT: 199 LBS | DIASTOLIC BLOOD PRESSURE: 91 MMHG | HEIGHT: 61 IN

## 2022-04-20 DIAGNOSIS — E22.9 PITUITARY MICROADENOMA WITH HYPERPROLACTINEMIA (H): ICD-10-CM

## 2022-04-20 DIAGNOSIS — E53.8 LOW SERUM VITAMIN B12: Primary | ICD-10-CM

## 2022-04-20 DIAGNOSIS — G43.009 MIGRAINE WITHOUT AURA AND WITHOUT STATUS MIGRAINOSUS, NOT INTRACTABLE: ICD-10-CM

## 2022-04-20 DIAGNOSIS — G47.09 OTHER INSOMNIA: ICD-10-CM

## 2022-04-20 DIAGNOSIS — D35.2 PITUITARY MICROADENOMA WITH HYPERPROLACTINEMIA (H): ICD-10-CM

## 2022-04-20 PROCEDURE — 99204 OFFICE O/P NEW MOD 45 MIN: CPT | Performed by: STUDENT IN AN ORGANIZED HEALTH CARE EDUCATION/TRAINING PROGRAM

## 2022-04-20 RX ORDER — MAGNESIUM OXIDE 400 MG/1
400 TABLET ORAL DAILY
Qty: 30 TABLET | Refills: 6 | Status: SHIPPED | OUTPATIENT
Start: 2022-04-20 | End: 2023-01-02

## 2022-04-20 RX ORDER — LANOLIN ALCOHOL/MO/W.PET/CERES
3 CREAM (GRAM) TOPICAL
Qty: 30 TABLET | Refills: 3 | Status: SHIPPED | OUTPATIENT
Start: 2022-04-20 | End: 2023-01-19

## 2022-04-20 RX ORDER — RIBOFLAVIN (VITAMIN B2) 400 MG
1 TABLET ORAL DAILY
Qty: 30 TABLET | Refills: 7 | Status: SHIPPED | OUTPATIENT
Start: 2022-04-20 | End: 2023-01-19

## 2022-04-20 RX ORDER — UREA 10 %
500 LOTION (ML) TOPICAL DAILY
Qty: 30 TABLET | Refills: 5 | Status: SHIPPED | OUTPATIENT
Start: 2022-04-20 | End: 2022-11-22

## 2022-04-20 ASSESSMENT — PAIN SCALES - GENERAL: PAINLEVEL: NO PAIN (0)
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
## 2022-04-20 NOTE — PATIENT INSTRUCTIONS
"Vitamin B12 500-1000 mcg daily for general health/ low/normal level    Riboflavin B2 400 mcg daily  Magnesium 400 mg daily--discussed possible side effects: for headaches    Lets just keep an eye on your pituitary, related labs.    I would discuss the new diabetes drug, these symptoms could be related to hypoglycemia.      Keep working on sleep.    Sleep hygiene:  Only use bed for sleep  Minimize light including cell phones at night, use light box in AM  If having trouble falling asleep, after 15 minutes get up and do something else, read, etc.  Easier said than done to not be ruminative, trial of \"headspace\" or other apps to help clear mind prior to sleep attempts.  Ensure room is dark/trial of light blocking sleep aid if not  Trial of white noise if not already utilized  Ensure room is cool enough for adequate sleep   Trial of low dose melatonin if not already tried  Other pharmacotherapy options discussed    My favorite --trazodone, insomnia as needed--reach out if you want to try this prescription.      If headaches worsens could consider higher propranolol dose.    You can use tylenol as needed choice with your celebrex  "

## 2022-04-20 NOTE — PROGRESS NOTES
"Socorro Etienne's goals for this visit include:   Chief Complaint   Patient presents with     New Patient     Migraine w/o aura / could be contraindications with med causing problems       She requests these members of her care team be copied on today's visit information:NO    PCP: Brittanie Elkins  [address]  [place] [address]    BP (!) 140/91 (BP Location: Right arm, Patient Position: Sitting, Cuff Size: Adult Regular)   Ht 1.549 m (5' 1\")   Wt 90.3 kg (199 lb)   LMP 03/22/2020   BMI 37.60 kg/m      Do you need any medication refills at today's visit? Possibly yes  Martin Fitzpatrick CMA      "
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
## 2022-05-07 ENCOUNTER — HEALTH MAINTENANCE LETTER (OUTPATIENT)
Age: 52
End: 2022-05-07

## 2022-05-09 NOTE — TELEPHONE ENCOUNTER
Having trouble scheduling lab appt, reach out to see if we can help her.  Brittanie Elkins MD     Patient

## 2022-05-31 DIAGNOSIS — G43.009 MIGRAINE WITHOUT AURA AND WITHOUT STATUS MIGRAINOSUS, NOT INTRACTABLE: ICD-10-CM

## 2022-05-31 RX ORDER — PROPRANOLOL HCL 60 MG
60 CAPSULE, EXTENDED RELEASE 24HR ORAL DAILY
Qty: 90 CAPSULE | Refills: 0 | Status: SHIPPED | OUTPATIENT
Start: 2022-05-31 | End: 2022-09-07

## 2022-05-31 NOTE — TELEPHONE ENCOUNTER
Writer received a refill request from: [facility] [place].   Pending Prescriptions:                       Disp   Refills    propranolol ER (INDERAL LA) 60 MG 24 hr c*90 cap*0            Sig: Take 1 capsule (60 mg) by mouth daily          Pt's last office visit: 02/28/2022  Next scheduled office visit: 08/25/2022      Per the RN/LPN medication refill protocol, writer is unable to refill this request.       Ban Puentes LPN

## 2022-05-31 NOTE — TELEPHONE ENCOUNTER
After review of her chart, it looks like back on 3/23/22 her blood pressure was 128/76 which is within the parameters. Prescription signed.    Bertha Marquez RN   Neurology Care Coordinator

## 2022-07-02 ENCOUNTER — HEALTH MAINTENANCE LETTER (OUTPATIENT)
Age: 52
End: 2022-07-02

## 2022-08-27 ENCOUNTER — HEALTH MAINTENANCE LETTER (OUTPATIENT)
Age: 52
End: 2022-08-27

## 2022-09-06 DIAGNOSIS — G43.009 MIGRAINE WITHOUT AURA AND WITHOUT STATUS MIGRAINOSUS, NOT INTRACTABLE: ICD-10-CM

## 2022-09-06 NOTE — TELEPHONE ENCOUNTER
Pending Prescriptions:                       Disp   Refills    propranolol ER (INDERAL LA) 60 MG 24 hr c*90 cap*0            Sig: Take 1 capsule (60 mg) by mouth daily    [facility]ed [facility]    Pt's last office visit: 04/20/2022  Next scheduled office visit: 01/19/2023-pt cancelled 08/25/2022 Follow-up with [facility]      Per the RN/LPN medication refill protocol, writer is unable to refill this request.     Ban Puentes LPN

## 2022-09-07 RX ORDER — PROPRANOLOL HCL 60 MG
60 CAPSULE, EXTENDED RELEASE 24HR ORAL DAILY
Qty: 90 CAPSULE | Refills: 0 | Status: SHIPPED | OUTPATIENT
Start: 2022-09-07 | End: 2022-12-21

## 2022-09-27 ENCOUNTER — OFFICE VISIT (OUTPATIENT)
Dept: FAMILY MEDICINE | Facility: OTHER | Age: 52
End: 2022-09-27
Payer: COMMERCIAL

## 2022-09-27 VITALS
HEIGHT: 61 IN | HEART RATE: 101 BPM | SYSTOLIC BLOOD PRESSURE: 130 MMHG | DIASTOLIC BLOOD PRESSURE: 76 MMHG | BODY MASS INDEX: 37 KG/M2 | OXYGEN SATURATION: 97 % | WEIGHT: 196 LBS | RESPIRATION RATE: 20 BRPM | TEMPERATURE: 97.9 F

## 2022-09-27 DIAGNOSIS — Z11.59 NEED FOR HEPATITIS B SCREENING TEST: ICD-10-CM

## 2022-09-27 DIAGNOSIS — Z13.220 SCREENING FOR HYPERLIPIDEMIA: ICD-10-CM

## 2022-09-27 DIAGNOSIS — L60.0 INGROWN NAIL: Primary | ICD-10-CM

## 2022-09-27 DIAGNOSIS — Z12.31 VISIT FOR SCREENING MAMMOGRAM: ICD-10-CM

## 2022-09-27 DIAGNOSIS — E53.8 VITAMIN B12 DEFICIENCY (NON ANEMIC): ICD-10-CM

## 2022-09-27 DIAGNOSIS — E55.9 VITAMIN D DEFICIENCY: ICD-10-CM

## 2022-09-27 DIAGNOSIS — Z11.59 NEED FOR HEPATITIS C SCREENING TEST: ICD-10-CM

## 2022-09-27 DIAGNOSIS — Z11.4 ENCOUNTER FOR SCREENING FOR HIV: ICD-10-CM

## 2022-09-27 DIAGNOSIS — E78.5 HYPERLIPIDEMIA LDL GOAL <130: ICD-10-CM

## 2022-09-27 LAB
CHOLEST SERPL-MCNC: 274 MG/DL
FASTING STATUS PATIENT QL REPORTED: NO
HDLC SERPL-MCNC: 36 MG/DL
LDLC SERPL CALC-MCNC: 185 MG/DL
NONHDLC SERPL-MCNC: 238 MG/DL
TRIGL SERPL-MCNC: 267 MG/DL

## 2022-09-27 PROCEDURE — 36415 COLL VENOUS BLD VENIPUNCTURE: CPT | Performed by: STUDENT IN AN ORGANIZED HEALTH CARE EDUCATION/TRAINING PROGRAM

## 2022-09-27 PROCEDURE — 82306 VITAMIN D 25 HYDROXY: CPT | Performed by: STUDENT IN AN ORGANIZED HEALTH CARE EDUCATION/TRAINING PROGRAM

## 2022-09-27 PROCEDURE — 99214 OFFICE O/P EST MOD 30 MIN: CPT | Performed by: STUDENT IN AN ORGANIZED HEALTH CARE EDUCATION/TRAINING PROGRAM

## 2022-09-27 PROCEDURE — 86706 HEP B SURFACE ANTIBODY: CPT | Performed by: STUDENT IN AN ORGANIZED HEALTH CARE EDUCATION/TRAINING PROGRAM

## 2022-09-27 PROCEDURE — 86803 HEPATITIS C AB TEST: CPT | Performed by: STUDENT IN AN ORGANIZED HEALTH CARE EDUCATION/TRAINING PROGRAM

## 2022-09-27 PROCEDURE — 80061 LIPID PANEL: CPT | Performed by: STUDENT IN AN ORGANIZED HEALTH CARE EDUCATION/TRAINING PROGRAM

## 2022-09-27 PROCEDURE — 87389 HIV-1 AG W/HIV-1&-2 AB AG IA: CPT | Performed by: STUDENT IN AN ORGANIZED HEALTH CARE EDUCATION/TRAINING PROGRAM

## 2022-09-27 PROCEDURE — 82607 VITAMIN B-12: CPT | Performed by: STUDENT IN AN ORGANIZED HEALTH CARE EDUCATION/TRAINING PROGRAM

## 2022-09-27 RX ORDER — LEVOTHYROXINE SODIUM 100 MCG
100 TABLET ORAL DAILY
COMMUNITY
Start: 2022-09-12

## 2022-09-27 ASSESSMENT — PAIN SCALES - GENERAL: PAINLEVEL: MODERATE PAIN (4)

## 2022-09-27 NOTE — PROGRESS NOTES
Assessment & Plan     Ingrown nail  Of the right great toe.  In particular the lateral aspect.  Definitive treatment would be a nail avulsion procedure.  Patient is declining this as of now.  We discussed about possibilities of reinfection.  Also discussed about conservative measures including raising the nailbed with dental floss/cotton, nail trimming, hygiene care, etc.  No current infection at this time but has had 2 infections this year.  We will continue to closely watch this.    Visit for screening mammogram  Due for screening  - MA SCREENING DIGITAL BILAT - Future  (s+30); Future    Screening for hyperlipidemia  Due for screening  - Lipid panel reflex to direct LDL Non-fasting    Encounter for screening for HIV  Need for hepatitis C screening test  Need for hepatitis B screening test  Due for screening, patient is also starting a new job as a .  Patient is unsure about her immune status for hepatitis B and wishes to have this screened as well and is interested in the vaccine series if negative.  - HIV Antigen Antibody Combo  - Hepatitis C Screen Reflex to HCV RNA Quant and Genotype  - Hepatitis B Surface Antibody    Vitamin B12 deficiency (non anemic)  - Vitamin B12    Vitamin D deficiency  - Vitamin D Deficiency      BRET STAFFORD MD  [facility]    Gena Quintanilla is a 52 year old, presenting for the following health issues:  toenail infecton      History of Present Illness       Reason for visit:  Possible ingrown toenail and possible sinus infection    She eats 0-1 servings of fruits and vegetables daily.She consumes 1 sweetened beverage(s) daily.She exercises with enough effort to increase her heart rate 9 or less minutes per day.  She exercises with enough effort to increase her heart rate 3 or less days per week.   She is taking medications regularly.       Concern - possible ingrown toenail/nail infection  Onset: 2-3 months  Description: red,  "swelling  Intensity: worse when walking or wearing shoes  Progression of Symptoms:  constant  Accompanying Signs & Symptoms: none  Previous history of similar problem: yes, 4th visit for this  Precipitating factors:        Worsened by: walking, wearing shoes  Alleviating factors:        Improved by: soaking  Therapies tried and outcome: antibiotics not helpful        Review of Systems   Constitutional, HEENT, cardiovascular, pulmonary, gi and gu systems are negative, except as otherwise noted.      Objective    /76 (BP Location: Right arm, Patient Position: Sitting, Cuff Size: Adult Large)   Pulse 101   Temp 97.9  F (36.6  C) (Temporal)   Resp 20   Ht 1.549 m (5' 1\")   Wt 88.9 kg (196 lb)   LMP 03/22/2020   SpO2 97%   Breastfeeding No   BMI 37.03 kg/m    Body mass index is 37.03 kg/m .  Physical Exam  Vitals and nursing note reviewed.   Constitutional:       General: She is not in acute distress.     Appearance: Normal appearance. She is not ill-appearing, toxic-appearing or diaphoretic.   HENT:      Head: Normocephalic and atraumatic.      Right Ear: Tympanic membrane, ear canal and external ear normal. There is no impacted cerumen.      Left Ear: Tympanic membrane, ear canal and external ear normal. There is no impacted cerumen.      Nose: Nose normal. No congestion or rhinorrhea.      Mouth/Throat:      Mouth: Mucous membranes are moist.      Pharynx: Oropharynx is clear. No oropharyngeal exudate or posterior oropharyngeal erythema.   Eyes:      General:         Right eye: No discharge.         Left eye: No discharge.      Extraocular Movements: Extraocular movements intact.      Conjunctiva/sclera: Conjunctivae normal.      Pupils: Pupils are equal, round, and reactive to light.   Cardiovascular:      Rate and Rhythm: Normal rate and regular rhythm.      Heart sounds: No murmur heard.  Pulmonary:      Effort: Pulmonary effort is normal. No respiratory distress.      Breath sounds: Normal breath " sounds.   Musculoskeletal:         General: Normal range of motion.      Cervical back: Normal range of motion.   Lymphadenopathy:      Cervical: No cervical adenopathy.   Skin:     Comments: Lateral aspect of the right great toe ingrown nail not infected, no drainage, no erythema, some slight swelling, not warm to touch.  Corner of the nail is digging into the skin, was able to be raised with rolled up cotton.   Neurological:      Mental Status: She is alert.   Psychiatric:         Mood and Affect: Mood normal.         Behavior: Behavior normal.         Thought Content: Thought content normal.

## 2022-09-27 NOTE — PATIENT INSTRUCTIONS
Information from [facility]: Ingrown Toenails    What causes an ingrown toenail?  Many things can cause ingrown toenails. One main cause is wearing shoes that do not fit well. Shoes that are too tight or too small can press the skin of your toe into your toenail. Incorrectly cutting your toenails is another main cause. Toenails that are peeled off at the edge or trimmed down at the corners are more likely to become ingrown. An injury to your toe also can cause an ingrown toenail. People who have deformed or misshaped toenails have a higher risk of ingrown toenails.    How is an ingrown toenail diagnosed?  Your doctor can examine your toe and toenail. If you have an ingrown toenail, your doctor may order treatment. Tell your doctor if you get ingrown toenails often. People who have diabetes are at risk of complications from an ingrown toenail. Also tell your doctor if you are or may be pregnant because some treatments cannot be used during pregnancy.    Can an ingrown toenail be prevented or avoided?  To avoid ingrown toenails, cut your nails straight across. The top of your nail should make a straight line. Do not pick at your nails or tear them at the corners. Wear shoes that fit correctly and allow plenty of room for your toes. Avoid high heels and tight-fitting shoes.    What is the treatment for ingrown toenails?  Mild ingrown toenails can be treated at home. Soak your foot in warm water for 15 to 20 minutes. Dry your foot, then place a twist of cotton under the corner of your nail. You can wet the cotton with water or a disinfectant. This should be changed at least once a day. Try to wear open-toe shoes, such as sandals, that do not rub the toenail. This will help healing and remove chances of irritation. Contact your doctor if your ingrown toenail does not improve or gets worse, including increased pain, swelling, and drainage.    An ingrown toenail may require minor office surgery. The procedure  involves removing the part of the nail that is ingrown. Before surgery, the doctor will numb your toe by injecting it with medicine. The doctor will lift your toenail along the edge that is growing into your skin. Then, the doctor will cut and pull out that piece of nail. The doctor may apply a small electrical charge or liquid solution to the exposed part of your nail bed. This is called ablation. It should keep the toenail from growing into your skin again. Not all people need ablation.    Instructions to follow after office surgery for ingrown toenail:    Soak your foot in warm water each day.    Keep a bandage over the site until it heals.    Take acetaminophen (brand name: Tylenol) or ibuprofen (brand name: Motrin) as needed for pain.    Keep the wound clean and dry; you may shower the day after surgery.    Wear loose fitting shoes or open-toe shoes for the first two weeks.    Avoid running or strenuous activity for the first two weeks.    Call your doctor if the toe is not healing.    WOUND CARE:    After simple avulsion, the digit should be soaked in warm water 24 to 48 hours after the procedure. Before removal of the dressing, patients are instructed to soak their digit in the shower or in a bowl to avoid bleeding and pain when the dressing is removed. A gelatinous film of epithelium may be present over the exposed nail bed and can be gently removed with dilute hydrogen peroxide on a cotton-tipped applicator. The cotton tip should be rolled over the site gently, avoiding rubbing or vigorous attempts at tissue removal. A small amount of petrolatum or antibiotic ointment is placed on the tissue, then it is covered with an adhesive bandage for a simple avulsion or roller gauze. This dressing change should be repeated daily.    Patients should be advised that a serous (clear colored) discharge from the wound may occur in the first few days and up to two weeks postoperatively    If a portion of the nail or the  entire nail regrows, the procedure can be repeated.    The nail bed typically grows normal skin within 7 to 10 days      COMPLICATIONS:    Pain -- Pain is the most common complication following nail surgery. Most pain occurs in the first 24 to 48 hours, and the majority of patients can return to normal activities while wearing an open-toed shoe after 48 hours. Pain control measures include elevation of the affected limb, use of ice packs, and oral analgesics.    ?Adequate elevation requires that the limb be held above the level of the heart. Elevation should be continued for 24 to 48 hours.  ?Ice packs applied to the dorsal foot in the case of toenails or dorsal hand or wrist for fingernails may diminish pain and slow the clearing of anesthesia.  ?Oral analgesics, including acetaminophen, nonsteroidal anti-inflammatory drugs, and oral opioids, can be used in combination with elevation.     Persistent or worsening pain 48 hours after the procedure indicates an infection or chemical cellulitis. Patients should be instructed to contact the clinician in the following situations:    ?The pain worsens rather than improves over 24 hours.  ?There is increasing redness of the area.  ?A red streak develops.  ?Pus is present.  ?There is fever.      Living with an ingrown toenail?  Treatment--at home or from your doctor--helps treat symptoms and heal the toe. After the toe heals, practice good foot care to prevent future problems and to keep an ingrown toenail from coming back. If you have surgery, it may take two to four months for your toenail to grow back.

## 2022-09-28 LAB
DEPRECATED CALCIDIOL+CALCIFEROL SERPL-MC: 45 UG/L (ref 20–75)
HBV SURFACE AB SERPL IA-ACNC: 1.27 M[IU]/ML
HBV SURFACE AB SERPL IA-ACNC: NONREACTIVE M[IU]/ML
HCV AB SERPL QL IA: NONREACTIVE
HIV 1+2 AB+HIV1 P24 AG SERPL QL IA: NONREACTIVE
VIT B12 SERPL-MCNC: 711 PG/ML (ref 232–1245)

## 2022-09-30 ENCOUNTER — TELEPHONE (OUTPATIENT)
Dept: FAMILY MEDICINE | Facility: OTHER | Age: 52
End: 2022-09-30

## 2022-09-30 NOTE — TELEPHONE ENCOUNTER
Called pt, voicemail full. If calls back relay info and schedule.         Team -can we please place the patient on a nursing clinic visit for hepatitis B vaccine update.  Please call her and schedule this.       Thank you,     Kristian Lopez MD

## 2022-09-30 NOTE — LETTER
October 5, 2022    Socorro Etienne    [address]  [address] [address]  [facility] [address]    Dear Socorro,    Looks like we have been trying to reach out to you. We are trying to notify you that your due for your Hep B vaccine.     Please call to schedule a Float visit at [phone].        Thanks,      [facility] team

## 2022-11-22 DIAGNOSIS — E53.8 LOW SERUM VITAMIN B12: ICD-10-CM

## 2022-11-22 RX ORDER — UREA 10 %
500 LOTION (ML) TOPICAL DAILY
Qty: 30 TABLET | Refills: 5 | Status: SHIPPED | OUTPATIENT
Start: 2022-11-22 | End: 2023-01-19

## 2022-11-22 NOTE — TELEPHONE ENCOUNTER
Authorized per medication refill protocol.    Bertha Marquez, RN Care Coordinator   Neurology/Neurosurgery/PM&R/ Pain Management

## 2022-11-22 NOTE — TELEPHONE ENCOUNTER
Pending Prescriptions:                       Disp   Refills    cyanocobalamin (VITAMIN B-12) 500 MCG tab*30 tab*5            Sig: Take 1 tablet (500 mcg) by mouth daily      Requested Pharmacy: [facility] in [place]    Pt's last office visit: 4/20/22  Next scheduled office visit: 1/19/23      Per the RN/LPN medication refill protocol, writer is unable to refill this request.

## 2022-12-02 ENCOUNTER — NURSE TRIAGE (OUTPATIENT)
Dept: FAMILY MEDICINE | Facility: OTHER | Age: 52
End: 2022-12-02

## 2022-12-02 ENCOUNTER — ALLIED HEALTH/NURSE VISIT (OUTPATIENT)
Dept: FAMILY MEDICINE | Facility: OTHER | Age: 52
End: 2022-12-02
Payer: COMMERCIAL

## 2022-12-02 VITALS — DIASTOLIC BLOOD PRESSURE: 79 MMHG | SYSTOLIC BLOOD PRESSURE: 109 MMHG | OXYGEN SATURATION: 95 % | HEART RATE: 94 BPM

## 2022-12-02 DIAGNOSIS — Z78.9 TRIAGE ASSESSMENT CLASS 3, NONURGENT: Primary | ICD-10-CM

## 2022-12-02 PROCEDURE — 99207 PR NO CHARGE NURSE ONLY: CPT

## 2022-12-02 ASSESSMENT — PAIN SCALES - GENERAL: PAINLEVEL: MILD PAIN (2)
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
## 2022-12-02 NOTE — TELEPHONE ENCOUNTER
Reason for Disposition    Body aches or pains are not better after 3 days    Headache and age > 50 years    Additional Information    Negative: HIGH RISK MECHANISM (e.g., entrapped or unable to exit vehicle without help, death of another passenger, full or partial ejection, rollover, steering wheel bent, vehicle intrusion, motorcycle crash > 20 mph or 32 km/h)    Negative: HIGH RISK INJURY to head, face, neck, torso or extremities (e.g., amputation, crush, deformity, penetrating wound)    Negative: ACUTE NEURO SYMPTOMS (e.g., confusion, slurred speech, arm or leg weakness)    Negative: Neck or back pain  (Exception: Pain began > 1 hour after injury.)    Negative: Difficulty breathing    Negative: Major bleeding (e.g., actively dripping or spurting)    Negative: Severe chest or abdomen pain (i.e., excruciating)    Negative: Shock suspected (e.g., cold/pale/clammy skin, too weak to stand, low BP, rapid pulse)    Negative: Passed out (i.e., lost consciousness, collapsed and was not responding)    Negative: Seizure (convulsion) occurred  (Exception: Prior history of seizures and now alert and without Acute Neuro Symptoms.)    Negative: Pedestrian or bicyclist struck by motor vehicle and ANY major impact (e.g., thrown, run over)    Negative: Caller is unreliable or unable to provide information (e.g., intoxicated, severe intellectual disability)    Negative: Sounds like a life-threatening emergency to the triager    Negative: Neck or back pain and began > 1 hour after injury    Negative: Abdominal or chest pain and NOT severe    Negative: Struck abdomen on handlebars (e.g., bicycle, motorcycle) and visible signs of abdominal trauma (e.g., bruising, abrasion)    Negative: Shoulder pain and any injury to abdomen or chest    Negative: Bruising or abrasion from seat belt to neck, chest or abdomen (i.e., Seatbelt Sign)    Negative: Vomiting    Negative: Sounds like a serious injury to the triager    Negative: Taking  Coumadin (warfarin) or other strong blood thinner, or known bleeding disorder (e.g., thrombocytopenia) (Exception: low speed, minor motor vehicle accident AND no trauma to head/face, chest or abdomen)    Negative: Age > 55 (Exception: low speed minor motor vehicle accident with no major impact)    Negative: Patient wants to be seen (minor motor vehicle accident with NO concerning symptoms or findings)    Negative: Complete loss of vision in 1 or both eyes    Negative: SEVERE eye pain    Negative: Severe headache    Negative: Double vision    Negative: Blurred vision or visual changes and present now and sudden onset or new (e.g., minutes, hours, days)  (Exception: Seeing floaters / black specks OR previously diagnosed migraine headaches with same symptoms.)    Negative: Patient sounds very sick or weak to the triager    Negative: Flashes of light  (Exception: brief from pressing on the eyeball)    Negative: Eye pain and brief (now gone) blurred vision or visual changes    Negative: Taking digoxin (e.g., Lanoxin, Digitek, Cardoxin, Lanoxicaps; Toloxin in [place]) and blurred vision, yellow vision, or yellow-green halos    Negative: Many floaters in the eye    Negative: Jaw pain while eating and age > 50 years    Protocols used: MOTOR VEHICLE ACCIDENT-A-OH, VISION LOSS OR CHANGE-A-OH

## 2022-12-21 ENCOUNTER — MYC REFILL (OUTPATIENT)
Dept: NEUROLOGY | Facility: CLINIC | Age: 52
End: 2022-12-21

## 2022-12-21 DIAGNOSIS — G43.009 MIGRAINE WITHOUT AURA AND WITHOUT STATUS MIGRAINOSUS, NOT INTRACTABLE: ICD-10-CM

## 2022-12-21 RX ORDER — PROPRANOLOL HCL 60 MG
60 CAPSULE, EXTENDED RELEASE 24HR ORAL DAILY
Qty: 90 CAPSULE | Refills: 0 | Status: CANCELLED | OUTPATIENT
Start: 2022-12-21

## 2022-12-21 NOTE — TELEPHONE ENCOUNTER
[facility] Call Center    Phone Message    May a detailed message be left on voicemail: yes     Reason for Call: Medication Refill Request    Has the patient contacted the pharmacy for the refill? Yes   Name of medication being requested: propranolol ER (INDERAL LA) 60 MG 24 hr capsule  Provider who prescribed the medication: Louise Schwartz   Pharmacy: [id number] [place], [place] - [address] AT [facility] [address]  Date medication is needed: ASAP     Patient states she is needing a refill for the medication listed above. Patient is completely out and unable to take last night. Patient also states Dr. Davey prescribes this medication. Writer sees last refill was done by Dr. Schwartz.     Please call pt back to advise at # [phone]      Action Taken: Message routed to:  Other: [facility]     Travel Screening: Not Applicable

## 2022-12-21 NOTE — TELEPHONE ENCOUNTER
Rx Authorization:  Requested Medication/ Dose propranolol ER (INDERAL LA) 60 MG 24 hr capsule  Date last refill ordered: 9/7/22  Quantity ordered: 90  # refills: 0  Date of last clinic visit with ordering provider: 4/40/22  Date of next clinic visit with ordering provider:   All pertinent protocol data (lab date/result):   Include pertinent information from patients message:

## 2022-12-21 NOTE — TELEPHONE ENCOUNTER
Forwarded to Dr Davey to review and sign.    Bertha Marquez, RN Care Coordinator   Neurology/Neurosurgery/PM&R/ Pain Management

## 2022-12-22 ENCOUNTER — ALLIED HEALTH/NURSE VISIT (OUTPATIENT)
Dept: FAMILY MEDICINE | Facility: OTHER | Age: 52
End: 2022-12-22
Payer: COMMERCIAL

## 2022-12-22 ENCOUNTER — MYC MEDICAL ADVICE (OUTPATIENT)
Dept: NEUROLOGY | Facility: CLINIC | Age: 52
End: 2022-12-22

## 2022-12-22 DIAGNOSIS — Z23 NEED FOR VACCINATION: Primary | ICD-10-CM

## 2022-12-22 PROCEDURE — 90746 HEPB VACCINE 3 DOSE ADULT IM: CPT

## 2022-12-22 PROCEDURE — 99207 PR NO CHARGE NURSE ONLY: CPT

## 2022-12-22 PROCEDURE — 90471 IMMUNIZATION ADMIN: CPT

## 2022-12-22 RX ORDER — PROPRANOLOL HCL 60 MG
60 CAPSULE, EXTENDED RELEASE 24HR ORAL DAILY
Qty: 90 CAPSULE | Refills: 0 | Status: SHIPPED | OUTPATIENT
Start: 2022-12-22 | End: 2023-01-19

## 2022-12-22 NOTE — TELEPHONE ENCOUNTER
[facility] Call Center    Phone Message    May a detailed message be left on voicemail: yes     Reason for Call: Medication Question or concern regarding medication   Prescription Clarification  Name of Medication: propranolol ER (INDERAL LA) 60 MG 24 hr capsule  Prescribing Provider: David Davey MD   Pharmacy: [facility] #[id number] [place], [place] - [address] AT [facility] [address]   What on the order needs clarification? Pt is calling because her Rx was denied by  (whom she's never seen) and pt is out of her medication.Writer told pt a Rx was sent over today. Pt will check with the pharmacy but please send another Rx just in case because the pharmacy has not called pt about her Rx.    Action Taken: Message routed to:  Adult Clinics: Neurology p [id number]    Travel Screening: Not Applicable

## 2022-12-22 NOTE — TELEPHONE ENCOUNTER
This has been addressed and the medication is ready to be picked up at the pharmacy.    Bertha Marquez, RN Care Coordinator   Neurology/Neurosurgery/PM&R/ Pain Management

## 2022-12-22 NOTE — CONFIDENTIAL NOTE
RN confirmed with the pharmacy that they received the prescription and that it is ready for pickup. The pt was informed.    Bertha Marquez RN Care Coordinator   Neurology/Neurosurgery/PM&R/ Pain Management

## 2022-12-22 NOTE — PROGRESS NOTES
Prior to immunization administration, verified patients identity using patient s name and date of birth. Please see Immunization Activity for additional information.     Screening Questionnaire for Adult Immunization    Are you sick today?   No   Do you have allergies to medications, food, a vaccine component or latex?   No   Have you ever had a serious reaction after receiving a vaccination?   No   Do you have a long-term health problem with heart, lung, kidney, or metabolic disease (e.g., diabetes), asthma, a blood disorder, no spleen, complement component deficiency, a cochlear implant, or a spinal fluid leak?  Are you on long-term aspirin therapy?   No   Do you have cancer, leukemia, HIV/AIDS, or any other immune system problem?   No   Do you have a parent, brother, or sister with an immune system problem?   No   In the past 3 months, have you taken medications that affect  your immune system, such as prednisone, other steroids, or anticancer drugs; drugs for the treatment of rheumatoid arthritis, Crohn s disease, or psoriasis; or have you had radiation treatments?   No   Have you had a seizure, or a brain or other nervous system problem?   No   During the past year, have you received a transfusion of blood or blood    products, or been given immune (gamma) globulin or antiviral drug?   No   For women: Are you pregnant or is there a chance you could become       pregnant during the next month?   No   Have you received any vaccinations in the past 4 weeks?   No     Immunization questionnaire answers were all negative.        Per orders of Dr. Lopez, injection of Hep B given by Verna Lozada CMA. Patient instructed to remain in clinic for 15 minutes afterwards, and to report any adverse reaction to me immediately.       Screening performed by Verna Lozada CMA on 12/22/2022 at 2:40 PM.

## 2023-01-02 DIAGNOSIS — G43.009 MIGRAINE WITHOUT AURA AND WITHOUT STATUS MIGRAINOSUS, NOT INTRACTABLE: ICD-10-CM

## 2023-01-02 RX ORDER — MAGNESIUM OXIDE 400 MG/1
400 TABLET ORAL DAILY
Qty: 30 TABLET | Refills: 6 | Status: SHIPPED | OUTPATIENT
Start: 2023-01-02 | End: 2023-01-19

## 2023-01-02 NOTE — TELEPHONE ENCOUNTER
Pending Prescriptions:                       Disp   Refills    magnesium oxide (MAG-OX) 400 MG tablet    30 tab*6            Sig: Take 1 tablet (400 mg) by mouth daily      Requested Pharmacy: [facility] in [place]    Pt's last office visit: 4/20/22  Next scheduled office visit: 1/19/23      Per the RN/LPN medication refill protocol, writer is unable to refill this request.

## 2023-01-17 ENCOUNTER — TELEPHONE (OUTPATIENT)
Dept: NEUROLOGY | Facility: CLINIC | Age: 53
End: 2023-01-17
Payer: COMMERCIAL

## 2023-01-17 NOTE — TELEPHONE ENCOUNTER
[facility] Call Center    Phone Message    May a detailed message be left on voicemail: yes     Reason for Call: Appointment Intake      Pt called she has an appointment scheduled for 01/19/2023 4:30 pm. With Dr. Davey and she is looking to maybe do a virtual visit or is there anyway you could fit her in before June (next available)?  Pt states her school schedule change and she is not able to make the 01/19/2023 appointment in-person. Pt is worried about getting her medications refilled if he waits until June, she states it would be over a year an a half that she has seen Dr. Davey if she waits until June    Please follow up with pt at [phone].    Action Taken: Other: [facility]    Travel Screening: Not Applicable    Regis Carrasco on 1/17/2023 at 1:37 PM  Neurology Intake Representative

## 2023-01-19 ENCOUNTER — VIRTUAL VISIT (OUTPATIENT)
Dept: NEUROLOGY | Facility: CLINIC | Age: 53
End: 2023-01-19
Payer: COMMERCIAL

## 2023-01-19 ENCOUNTER — TELEPHONE (OUTPATIENT)
Dept: NEUROSURGERY | Facility: CLINIC | Age: 53
End: 2023-01-19

## 2023-01-19 DIAGNOSIS — E53.8 LOW SERUM VITAMIN B12: ICD-10-CM

## 2023-01-19 DIAGNOSIS — G43.009 MIGRAINE WITHOUT AURA AND WITHOUT STATUS MIGRAINOSUS, NOT INTRACTABLE: ICD-10-CM

## 2023-01-19 PROCEDURE — 99213 OFFICE O/P EST LOW 20 MIN: CPT | Mod: 95 | Performed by: STUDENT IN AN ORGANIZED HEALTH CARE EDUCATION/TRAINING PROGRAM

## 2023-01-19 RX ORDER — PROPRANOLOL HCL 60 MG
60 CAPSULE, EXTENDED RELEASE 24HR ORAL DAILY
Qty: 90 CAPSULE | Refills: 1 | Status: SHIPPED | OUTPATIENT
Start: 2023-01-19 | End: 2023-07-19

## 2023-01-19 RX ORDER — UREA 10 %
500 LOTION (ML) TOPICAL DAILY
Qty: 30 TABLET | Refills: 6 | Status: SHIPPED | OUTPATIENT
Start: 2023-01-19 | End: 2023-08-16

## 2023-01-19 RX ORDER — MAGNESIUM OXIDE 400 MG/1
400 TABLET ORAL DAILY
Qty: 30 TABLET | Refills: 6 | Status: SHIPPED | OUTPATIENT
Start: 2023-01-19 | End: 2023-07-19

## 2023-01-19 RX ORDER — RIBOFLAVIN (VITAMIN B2) 400 MG
1 TABLET ORAL DAILY
Qty: 30 TABLET | Refills: 7 | Status: SHIPPED | OUTPATIENT
Start: 2023-01-19 | End: 2023-07-19

## 2023-01-19 NOTE — TELEPHONE ENCOUNTER
I called patient to schedule 6 month Follow-up. Pt did not answer and mailbox was full.    Ban Puentes LPN
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
## 2023-01-19 NOTE — PATIENT INSTRUCTIONS
Continue magnesium, riboflavin, b12 for now  Will likely check b12 level after next visit  Continue propranolol daily  Continue naproxen as needed for headaches  See me back in person or virtually in 6 months

## 2023-01-19 NOTE — PROGRESS NOTES
Socorro is a 52 year old who is being evaluated via a billable video visit.      How would you like to obtain your AVS? [email or web address]  If the video visit is dropped, the invitation should be resent by: Send to e-mail at: [email or web address]  Will anyone else be joining your video visit? No        Video-Visit Details    Type of service:  Video Visit   Video Start Time: 4:30  Video End Time: 4:44     Originating Location (pt. Location): In car pulled over, in [place]    Distant Location (provider location):  On-site  Platform used for Video Visit: Bernardino
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
## 2023-01-20 ENCOUNTER — TELEPHONE (OUTPATIENT)
Dept: NEUROLOGY | Facility: CLINIC | Age: 53
End: 2023-01-20

## 2023-01-20 NOTE — TELEPHONE ENCOUNTER
1st attempt MyChart for the patient to call back and schedule the following:    Appointment type: Return  Provider: Dr. Davey  Return date: 7/19/2023  Specialty phone number: [phone]  Additional appointment(s) needed: none  Additonal Notes:     Patient is to return in 6 months, due July 2023.    Full vm. Sent a [employer] message.    Emily COLEMAN/Procedure    [facility]   Neurology, NeuroSurgery, NeuroPsychology and Pain Management Specialties  Medical/Surgical Adult Specialties

## 2023-02-03 ENCOUNTER — OFFICE VISIT (OUTPATIENT)
Dept: FAMILY MEDICINE | Facility: OTHER | Age: 53
End: 2023-02-03
Payer: COMMERCIAL

## 2023-02-03 VITALS
RESPIRATION RATE: 20 BRPM | WEIGHT: 193 LBS | HEIGHT: 61 IN | HEART RATE: 87 BPM | TEMPERATURE: 97 F | SYSTOLIC BLOOD PRESSURE: 108 MMHG | OXYGEN SATURATION: 97 % | BODY MASS INDEX: 36.44 KG/M2 | DIASTOLIC BLOOD PRESSURE: 82 MMHG

## 2023-02-03 DIAGNOSIS — E04.1 THYROID NODULE: ICD-10-CM

## 2023-02-03 DIAGNOSIS — E83.118 SECONDARY HEMOCHROMATOSIS: ICD-10-CM

## 2023-02-03 DIAGNOSIS — E03.9 HYPOTHYROIDISM, UNSPECIFIED TYPE: ICD-10-CM

## 2023-02-03 DIAGNOSIS — K76.0 FATTY LIVER: ICD-10-CM

## 2023-02-03 DIAGNOSIS — E55.9 VITAMIN D DEFICIENCY: Primary | ICD-10-CM

## 2023-02-03 DIAGNOSIS — E53.8 VITAMIN B12 DEFICIENCY (NON ANEMIC): ICD-10-CM

## 2023-02-03 LAB
ALBUMIN SERPL BCG-MCNC: 4.6 G/DL (ref 3.5–5.2)
ALP SERPL-CCNC: 78 U/L (ref 35–104)
ALT SERPL W P-5'-P-CCNC: 15 U/L (ref 10–35)
ANION GAP SERPL CALCULATED.3IONS-SCNC: 13 MMOL/L (ref 7–15)
AST SERPL W P-5'-P-CCNC: 18 U/L (ref 10–35)
BILIRUB SERPL-MCNC: 0.3 MG/DL
BUN SERPL-MCNC: 17.5 MG/DL (ref 6–20)
CALCIUM SERPL-MCNC: 9.3 MG/DL (ref 8.6–10)
CHLORIDE SERPL-SCNC: 101 MMOL/L (ref 98–107)
CREAT SERPL-MCNC: 0.62 MG/DL (ref 0.51–0.95)
DEPRECATED CALCIDIOL+CALCIFEROL SERPL-MC: 43 UG/L (ref 20–75)
DEPRECATED HCO3 PLAS-SCNC: 27 MMOL/L (ref 22–29)
ERYTHROCYTE [DISTWIDTH] IN BLOOD BY AUTOMATED COUNT: 13.2 % (ref 10–15)
FERRITIN SERPL-MCNC: 533 NG/ML (ref 11–328)
GFR SERPL CREATININE-BSD FRML MDRD: >90 ML/MIN/1.73M2
GLUCOSE SERPL-MCNC: 89 MG/DL (ref 70–99)
HCT VFR BLD AUTO: 43 % (ref 35–47)
HGB BLD-MCNC: 14 G/DL (ref 11.7–15.7)
IRON BINDING CAPACITY (ROCHE): 253 UG/DL (ref 240–430)
IRON SATN MFR SERPL: 21 % (ref 15–46)
IRON SERPL-MCNC: 54 UG/DL (ref 37–145)
MCH RBC QN AUTO: 29.1 PG (ref 26.5–33)
MCHC RBC AUTO-ENTMCNC: 32.6 G/DL (ref 31.5–36.5)
MCV RBC AUTO: 89 FL (ref 78–100)
PLATELET # BLD AUTO: 259 10E3/UL (ref 150–450)
POTASSIUM SERPL-SCNC: 4.2 MMOL/L (ref 3.4–5.3)
PROT SERPL-MCNC: 6.7 G/DL (ref 6.4–8.3)
RBC # BLD AUTO: 4.81 10E6/UL (ref 3.8–5.2)
SODIUM SERPL-SCNC: 141 MMOL/L (ref 136–145)
TSH SERPL DL<=0.005 MIU/L-ACNC: 1.65 UIU/ML (ref 0.3–4.2)
VIT B12 SERPL-MCNC: 850 PG/ML (ref 232–1245)
WBC # BLD AUTO: 6.9 10E3/UL (ref 4–11)

## 2023-02-03 PROCEDURE — 82306 VITAMIN D 25 HYDROXY: CPT | Performed by: STUDENT IN AN ORGANIZED HEALTH CARE EDUCATION/TRAINING PROGRAM

## 2023-02-03 PROCEDURE — 82728 ASSAY OF FERRITIN: CPT | Performed by: STUDENT IN AN ORGANIZED HEALTH CARE EDUCATION/TRAINING PROGRAM

## 2023-02-03 PROCEDURE — 82607 VITAMIN B-12: CPT | Performed by: STUDENT IN AN ORGANIZED HEALTH CARE EDUCATION/TRAINING PROGRAM

## 2023-02-03 PROCEDURE — 90471 IMMUNIZATION ADMIN: CPT | Performed by: STUDENT IN AN ORGANIZED HEALTH CARE EDUCATION/TRAINING PROGRAM

## 2023-02-03 PROCEDURE — 80053 COMPREHEN METABOLIC PANEL: CPT | Performed by: STUDENT IN AN ORGANIZED HEALTH CARE EDUCATION/TRAINING PROGRAM

## 2023-02-03 PROCEDURE — 83540 ASSAY OF IRON: CPT | Performed by: STUDENT IN AN ORGANIZED HEALTH CARE EDUCATION/TRAINING PROGRAM

## 2023-02-03 PROCEDURE — 36415 COLL VENOUS BLD VENIPUNCTURE: CPT | Performed by: STUDENT IN AN ORGANIZED HEALTH CARE EDUCATION/TRAINING PROGRAM

## 2023-02-03 PROCEDURE — 90746 HEPB VACCINE 3 DOSE ADULT IM: CPT | Performed by: STUDENT IN AN ORGANIZED HEALTH CARE EDUCATION/TRAINING PROGRAM

## 2023-02-03 PROCEDURE — 85027 COMPLETE CBC AUTOMATED: CPT | Performed by: STUDENT IN AN ORGANIZED HEALTH CARE EDUCATION/TRAINING PROGRAM

## 2023-02-03 PROCEDURE — 99214 OFFICE O/P EST MOD 30 MIN: CPT | Mod: 25 | Performed by: STUDENT IN AN ORGANIZED HEALTH CARE EDUCATION/TRAINING PROGRAM

## 2023-02-03 PROCEDURE — 83550 IRON BINDING TEST: CPT | Performed by: STUDENT IN AN ORGANIZED HEALTH CARE EDUCATION/TRAINING PROGRAM

## 2023-02-03 PROCEDURE — 84443 ASSAY THYROID STIM HORMONE: CPT | Performed by: STUDENT IN AN ORGANIZED HEALTH CARE EDUCATION/TRAINING PROGRAM

## 2023-02-03 RX ORDER — LIOTHYRONINE SODIUM 5 UG/1
1 TABLET ORAL
COMMUNITY
Start: 2023-01-18

## 2023-02-03 ASSESSMENT — PAIN SCALES - GENERAL: PAINLEVEL: MILD PAIN (3)

## 2023-02-03 NOTE — PROGRESS NOTES
Assessment & Plan     Vitamin D deficiency  - Vitamin D Deficiency  Vitamin B12 deficiency (non anemic)  - Vitamin B12  Fatty liver  - Comprehensive metabolic panel  Secondary hemochromatosis  - CBC with platelets  Hypothyroidism, unspecified type  - TSH with free T4 reflex  Thyroid nodule  - US Thyroid; Future  Ingrown toenail    Patient requesting update of her routine labs today.  She also notes that her hair has been falling out ever so slightly as of recently.  In the past this is typical when her thyroid levels are off.  Labs today.  She does follow-up with endocrinology, neurology, as well as hepatology for her medication refills.  Of note she did have a thyroid ultrasound?  Possibly CT?  Patient cannot remember, noted thyroid nodule at that time.  Patient will get this documentation sent over to us but recommended follow-up ultrasound from there.  This was ordered.  Continue with her over-the-counter supplementation.  Continue follow-up with specialty.  We did have a previous visit about ingrown toenail, this is doing much better with conservative measures, continue this for now, could consider nail avulsion if needed in the future.    BRET STAFFORD MD  [facility]    Gena Quintanilla is a 52 year old, presenting for the following health issues:  Follow Up      History of Present Illness       Hypertension: She presents for follow up of hypertension.  She does not check blood pressure  regularly outside of the clinic. Outpatient blood pressures have not been over 140/90. She follows a low salt diet.     Hypothyroidism:     Since last visit, patient describes the following symptoms::  Fatigue, Hair loss and Weight gain    Weight gain::  5 lbs.      Hair loss, levels might be off and cholesterol, anemia, hemochromatosis, hep b second shot, fatty liver      Review of Systems   Constitutional, HEENT, cardiovascular, pulmonary, gi and gu systems are negative, except as  "otherwise noted.      Objective    /82   Pulse 87   Temp 97  F (36.1  C) (Temporal)   Resp 20   Ht 1.555 m (5' 1.22\")   Wt 87.5 kg (193 lb)   LMP 03/22/2020   SpO2 97%   BMI 36.21 kg/m    Body mass index is 36.21 kg/m .  Physical Exam  Vitals and nursing note reviewed.   Constitutional:       General: She is not in acute distress.     Appearance: Normal appearance. She is not ill-appearing, toxic-appearing or diaphoretic.   HENT:      Head: Normocephalic.      Right Ear: External ear normal.      Left Ear: External ear normal.      Nose: No rhinorrhea.   Eyes:      General:         Right eye: No discharge.         Left eye: No discharge.      Extraocular Movements: Extraocular movements intact.      Conjunctiva/sclera: Conjunctivae normal.      Pupils: Pupils are equal, round, and reactive to light.   Pulmonary:      Effort: Pulmonary effort is normal. No respiratory distress.   Musculoskeletal:         General: Normal range of motion.      Cervical back: Normal range of motion.   Skin:     Comments: Left second toenail thickened with some possible onychomycosis under the painted nail?   Slight ingrown and curling of nail of the left and right great toe and right second toe   Neurological:      Mental Status: She is alert and oriented to person, place, and time.   Psychiatric:         Mood and Affect: Mood normal.         Behavior: Behavior normal.                    "

## 2023-03-21 ENCOUNTER — TELEPHONE (OUTPATIENT)
Dept: FAMILY MEDICINE | Facility: OTHER | Age: 53
End: 2023-03-21

## 2023-03-21 ENCOUNTER — MYC MEDICAL ADVICE (OUTPATIENT)
Dept: FAMILY MEDICINE | Facility: OTHER | Age: 53
End: 2023-03-21

## 2023-03-21 NOTE — TELEPHONE ENCOUNTER
Patient voicing concerns about visit today that was canceled.     Scheduled patient for new appointment. Reviewed scheduling recommendations for Hep B. [employer] message sent with information regarding voicing concerns and billing.     Coral Sweet, FELIPEN, RN, PHN  Registered Nurse-Clinic Triage  [facility]/[place]  3/21/2023 at 3:22 PM

## 2023-04-06 ENCOUNTER — TRANSFERRED RECORDS (OUTPATIENT)
Dept: HEALTH INFORMATION MANAGEMENT | Facility: CLINIC | Age: 53
End: 2023-04-06

## 2023-04-13 NOTE — NURSING NOTE
Chief Complaint(s) and History of Present Illness(es)     New Patient     In both eyes (Consult for pituitary tumor/microadenoma; vision changes).  Associated symptoms include double vision and headache.  Negative for eye pain and pain with eye movement.              Comments     Patient reports horizontal double vision, mainly when she looks at the distance. No history of prisms to help relieve with the double vision.   Started to wear glasses when she was 7 years old.   Did not bring glasses or contacts today. +astigmatism    MICHELLE Goyal 10/31/2019 12:32 PM                 40.3

## 2023-04-18 ENCOUNTER — ALLIED HEALTH/NURSE VISIT (OUTPATIENT)
Dept: FAMILY MEDICINE | Facility: OTHER | Age: 53
End: 2023-04-18
Payer: COMMERCIAL

## 2023-04-18 DIAGNOSIS — Z23 ENCOUNTER FOR IMMUNIZATION: Primary | ICD-10-CM

## 2023-04-18 PROCEDURE — 90746 HEPB VACCINE 3 DOSE ADULT IM: CPT

## 2023-04-18 PROCEDURE — 90471 IMMUNIZATION ADMIN: CPT

## 2023-04-18 PROCEDURE — 99207 PR NO CHARGE NURSE ONLY: CPT

## 2023-04-23 ENCOUNTER — HEALTH MAINTENANCE LETTER (OUTPATIENT)
Age: 53
End: 2023-04-23

## 2023-07-15 ENCOUNTER — HEALTH MAINTENANCE LETTER (OUTPATIENT)
Age: 53
End: 2023-07-15

## 2023-07-19 ENCOUNTER — OFFICE VISIT (OUTPATIENT)
Dept: NEUROLOGY | Facility: CLINIC | Age: 53
End: 2023-07-19
Payer: COMMERCIAL

## 2023-07-19 ENCOUNTER — TRANSFERRED RECORDS (OUTPATIENT)
Dept: HEALTH INFORMATION MANAGEMENT | Facility: CLINIC | Age: 53
End: 2023-07-19

## 2023-07-19 VITALS
BODY MASS INDEX: 34.55 KG/M2 | DIASTOLIC BLOOD PRESSURE: 78 MMHG | HEART RATE: 88 BPM | SYSTOLIC BLOOD PRESSURE: 126 MMHG | WEIGHT: 183 LBS | HEIGHT: 61 IN

## 2023-07-19 DIAGNOSIS — D35.2 PITUITARY MICROADENOMA WITH HYPERPROLACTINEMIA (H): Primary | ICD-10-CM

## 2023-07-19 DIAGNOSIS — G43.009 MIGRAINE WITHOUT AURA AND WITHOUT STATUS MIGRAINOSUS, NOT INTRACTABLE: ICD-10-CM

## 2023-07-19 DIAGNOSIS — E22.9 PITUITARY MICROADENOMA WITH HYPERPROLACTINEMIA (H): Primary | ICD-10-CM

## 2023-07-19 PROCEDURE — 99214 OFFICE O/P EST MOD 30 MIN: CPT | Performed by: STUDENT IN AN ORGANIZED HEALTH CARE EDUCATION/TRAINING PROGRAM

## 2023-07-19 RX ORDER — PROPRANOLOL HCL 60 MG
60 CAPSULE, EXTENDED RELEASE 24HR ORAL DAILY
Qty: 90 CAPSULE | Refills: 3 | Status: SHIPPED | OUTPATIENT
Start: 2023-07-19 | End: 2024-05-16

## 2023-07-19 RX ORDER — SEMAGLUTIDE 2.4 MG/.75ML
INJECTION, SOLUTION SUBCUTANEOUS
COMMUNITY
Start: 2023-07-10

## 2023-07-19 RX ORDER — BUDESONIDE 0.5 MG/2ML
INHALANT ORAL
COMMUNITY
Start: 2023-07-17

## 2023-07-19 RX ORDER — ACETAMINOPHEN 160 MG
1 TABLET,DISINTEGRATING ORAL DAILY
COMMUNITY
Start: 2023-04-28 | End: 2024-04-17

## 2023-07-19 RX ORDER — LIOTHYRONINE SODIUM 5 UG/1
TABLET ORAL
COMMUNITY
Start: 2022-10-01

## 2023-07-19 RX ORDER — MAGNESIUM OXIDE 400 MG/1
400 TABLET ORAL DAILY
Qty: 90 TABLET | Refills: 3 | Status: SHIPPED | OUTPATIENT
Start: 2023-07-19 | End: 2024-05-16

## 2023-07-19 RX ORDER — RIBOFLAVIN (VITAMIN B2) 400 MG
1 TABLET ORAL DAILY
Qty: 90 TABLET | Refills: 3 | Status: SHIPPED | OUTPATIENT
Start: 2023-07-19 | End: 2024-05-16
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
## 2023-07-19 NOTE — PATIENT INSTRUCTIONS
Continue propranolol 60 mg daily   Magnesium oxide 400 mg Riboflavin (vitamin b2) 400 mg daily    Recheck MRI brain, pending results could space out the MRIs farther.     Reach out sooner with any changes

## 2023-07-19 NOTE — NURSING NOTE
"Socorro Etienne's goals for this visit include:   Chief Complaint   Patient presents with     RECHECK     6 month migrain follow up, records in epic, scheduled per pt        She requests these members of her care team be copied on today's visit information: yes    PCP: Muriel Purcell    Referring Provider:  David Davey MD  [address] [place] [address]    /78   Pulse 88   Ht 1.549 m (5' 1\")   Wt 83 kg (183 lb)   LMP 03/20/2020   BMI 34.58 kg/m      Do you need any medication refills at today's visit? Yes  CARYN Munguia., CMA (AAMA)      "
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
## 2023-07-26 ENCOUNTER — TRANSFERRED RECORDS (OUTPATIENT)
Dept: HEALTH INFORMATION MANAGEMENT | Facility: CLINIC | Age: 53
End: 2023-07-26

## 2023-07-26 LAB
CREATININE (EXTERNAL): 0.6 MG/DL (ref 0.52–1.04)
GFR ESTIMATED (EXTERNAL): >60 ML/MIN/1.7
GLUCOSE (EXTERNAL): 73 MG/DL (ref 70–99)
POTASSIUM (EXTERNAL): 4.3 MMOL/L (ref 3.5–5.1)
TSH SERPL-ACNC: 2.07 UIU/ML (ref 0.47–4.68)

## 2023-07-31 ENCOUNTER — MEDICAL CORRESPONDENCE (OUTPATIENT)
Dept: HEALTH INFORMATION MANAGEMENT | Facility: CLINIC | Age: 53
End: 2023-07-31

## 2023-07-31 ENCOUNTER — OFFICE VISIT (OUTPATIENT)
Dept: FAMILY MEDICINE | Facility: OTHER | Age: 53
End: 2023-07-31
Payer: COMMERCIAL

## 2023-07-31 VITALS
RESPIRATION RATE: 28 BRPM | BODY MASS INDEX: 35.14 KG/M2 | SYSTOLIC BLOOD PRESSURE: 108 MMHG | DIASTOLIC BLOOD PRESSURE: 70 MMHG | TEMPERATURE: 97.2 F | HEART RATE: 86 BPM | WEIGHT: 186 LBS | OXYGEN SATURATION: 97 %

## 2023-07-31 DIAGNOSIS — R79.82 CRP ELEVATED: ICD-10-CM

## 2023-07-31 DIAGNOSIS — E83.52 SERUM CALCIUM ELEVATED: ICD-10-CM

## 2023-07-31 DIAGNOSIS — R10.9 FLANK PAIN: Primary | ICD-10-CM

## 2023-07-31 DIAGNOSIS — Z79.899 POLYPHARMACY: ICD-10-CM

## 2023-07-31 DIAGNOSIS — R79.89 ELEVATED PTHRP LEVEL: ICD-10-CM

## 2023-07-31 DIAGNOSIS — M54.50 LUMBAR PAIN: ICD-10-CM

## 2023-07-31 DIAGNOSIS — R79.89 ELEVATED FERRITIN: ICD-10-CM

## 2023-07-31 PROCEDURE — 99215 OFFICE O/P EST HI 40 MIN: CPT | Performed by: STUDENT IN AN ORGANIZED HEALTH CARE EDUCATION/TRAINING PROGRAM

## 2023-07-31 ASSESSMENT — PAIN SCALES - GENERAL: PAINLEVEL: SEVERE PAIN (6)

## 2023-07-31 NOTE — PROGRESS NOTES
Assessment & Plan     Flank pain  Lumbar pain  Recent ER visit with fairly benign work-up, imaging including CT, lab work, urinalysis without any significant acute findings.  Notable elevated calcium, details below.  Patient still has some ongoing left flank pain/L lumbar pain.  No specific culprit seen for her ER evaluation, could be musculoskeletal in etiology.  Patient is fairly certain that all this is kidney related, based off her evaluation though I am doubting this.  We did discuss about possibly nephrology referral but I am not sure what they would do with any abnormal signs/symptoms that would indicate this is kidney in origin.  I still think this is likely musculoskeletal or even soft tissue related.  I did offer physical therapy or even simple stretches or exercises to help improve her pain, patient is declining.  I think even conservative/topical/over-the-counter remedies would be of benefit    Elevated PTHrP level  Serum calcium elevated  She does see an endocrinologist out of [facility] network, continue follow-up with them, serum calcium did normalize with recent blood draw.  Thyroid nodules have not seen the past and has upcoming imaging    Polypharmacy  - Med Therapy Management Referral    Elevated ferritin  CRP elevated  Has been an ongoing issue without any significant resolved.  Previously worked with [facility] as well as rheumatology out of the [facility] network.  I think it would be of benefit for her to continue following up with them.  Based on her labs through [facility], not fairly certain that her elevated ferritin is related to hematologic origin but possibly her elevated inflammatory markers along with her chronically elevated CRP.  I think rheumatology could be of benefit, she previously saw them about 5 years ago    I have spent 55 or more minutes on the date of the encounter face-to-face with the patient and coordinating care such as reviewing documentation, results, or having  discussions over the phone.      BRET STAFFORD MD  [facility] JUAN Quintanilla is a 53 year old, presenting for the following health issues:  ER F/U        7/31/2023    10:43 AM   Additional Questions   Roomed by Ruthie   Accompanied by Self       HPI     ED/UC Followup:    Facility:  [facility] (Pt Brings copy of Records Declined Care everywhere)  Date of visit: 7/19/2023  Reason for visit: Kidney Pain - Unknown Diagnosis, Left leg swelling   Current Status: currently not feeling any better, states ER was a waste of time as provider never came back or followed up about results.          Review of Systems   Constitutional, HEENT, cardiovascular, pulmonary, gi and gu systems are negative, except as otherwise noted.      Objective    /70   Pulse 86   Temp 97.2  F (36.2  C) (Temporal)   Resp 28   Wt 84.4 kg (186 lb)   LMP 03/20/2020   SpO2 97%   BMI 35.14 kg/m    Body mass index is 35.14 kg/m .  Physical Exam  Vitals and nursing note reviewed.   Constitutional:       General: She is not in acute distress.     Appearance: Normal appearance. She is not ill-appearing, toxic-appearing or diaphoretic.   HENT:      Head: Normocephalic and atraumatic.      Right Ear: Tympanic membrane, ear canal and external ear normal. There is no impacted cerumen.      Left Ear: Tympanic membrane, ear canal and external ear normal. There is no impacted cerumen.      Nose: Nose normal. No congestion or rhinorrhea.      Mouth/Throat:      Mouth: Mucous membranes are moist.      Pharynx: Oropharynx is clear. No oropharyngeal exudate or posterior oropharyngeal erythema.   Eyes:      General: No scleral icterus.        Right eye: No discharge.         Left eye: No discharge.      Extraocular Movements: Extraocular movements intact.      Conjunctiva/sclera: Conjunctivae normal.      Pupils: Pupils are equal, round, and reactive to light.   Cardiovascular:      Rate and Rhythm: Normal rate and  regular rhythm.      Heart sounds: No murmur heard.  Pulmonary:      Effort: Pulmonary effort is normal. No respiratory distress.      Breath sounds: Normal breath sounds. No stridor.   Abdominal:      General: There is no distension.      Palpations: Abdomen is soft.      Tenderness: There is no abdominal tenderness. There is left CVA tenderness (slight). There is no right CVA tenderness.      Hernia: No hernia is present.   Musculoskeletal:         General: Normal range of motion.      Cervical back: Normal range of motion.      Right lower leg: No edema.      Left lower leg: No edema.   Lymphadenopathy:      Cervical: No cervical adenopathy.   Skin:     General: Skin is warm.   Neurological:      Mental Status: She is alert.   Psychiatric:         Mood and Affect: Mood normal.         Behavior: Behavior normal.         Thought Content: Thought content normal.         Judgment: Judgment normal.

## 2023-08-02 ENCOUNTER — TRANSFERRED RECORDS (OUTPATIENT)
Dept: HEALTH INFORMATION MANAGEMENT | Facility: CLINIC | Age: 53
End: 2023-08-02
Payer: COMMERCIAL

## 2023-08-03 ENCOUNTER — TELEPHONE (OUTPATIENT)
Dept: FAMILY MEDICINE | Facility: OTHER | Age: 53
End: 2023-08-03
Payer: COMMERCIAL

## 2023-08-03 NOTE — TELEPHONE ENCOUNTER
MTM referral from: [facility] visit (referral by provider)    MTM referral outreach attempt #2 on August 3, 2023 at 11:40 AM      Outcome: Patient not reachable after several attempts, will route to [facility] Pharmacist/Provider as an FYI.  [facility] scheduling number is [phone].  Thank you for the referral.    Use medica ma for the carrier/Plan on the flowsheet    Bill Bailon [facility]

## 2023-08-16 ENCOUNTER — ANCILLARY PROCEDURE (OUTPATIENT)
Dept: MRI IMAGING | Facility: CLINIC | Age: 53
End: 2023-08-16
Attending: STUDENT IN AN ORGANIZED HEALTH CARE EDUCATION/TRAINING PROGRAM
Payer: COMMERCIAL

## 2023-08-16 DIAGNOSIS — E22.9 PITUITARY MICROADENOMA WITH HYPERPROLACTINEMIA (H): ICD-10-CM

## 2023-08-16 DIAGNOSIS — D35.2 PITUITARY MICROADENOMA WITH HYPERPROLACTINEMIA (H): ICD-10-CM

## 2023-08-16 DIAGNOSIS — E53.8 LOW SERUM VITAMIN B12: ICD-10-CM

## 2023-08-16 PROCEDURE — 70553 MRI BRAIN STEM W/O & W/DYE: CPT | Mod: GC | Performed by: RADIOLOGY

## 2023-08-16 PROCEDURE — A9585 GADOBUTROL INJECTION: HCPCS | Performed by: RADIOLOGY

## 2023-08-16 RX ORDER — GADOBUTROL 604.72 MG/ML
10 INJECTION INTRAVENOUS ONCE
Status: COMPLETED | OUTPATIENT
Start: 2023-08-16 | End: 2023-08-16

## 2023-08-16 RX ORDER — UREA 10 %
500 LOTION (ML) TOPICAL DAILY
Qty: 30 TABLET | Refills: 6 | Status: SHIPPED | OUTPATIENT
Start: 2023-08-16 | End: 2024-05-16

## 2023-08-16 RX ADMIN — GADOBUTROL 8 ML: 604.72 INJECTION INTRAVENOUS at 17:33

## 2023-08-16 NOTE — TELEPHONE ENCOUNTER
Pending Prescriptions:                       Disp   Refills    cyanocobalamin (VITAMIN B-12) 500 MCG tab*30 tab*6            Sig: Take 1 tablet (500 mcg) by mouth daily      Requested Pharmacy: [facility] in [place]    Pt's last office visit: 7/19/23  Next scheduled office visit: 5/23/24      Per the RN/LPN medication refill protocol, writer is unable to refill this request.

## 2023-09-23 ENCOUNTER — HEALTH MAINTENANCE LETTER (OUTPATIENT)
Age: 53
End: 2023-09-23

## 2024-04-17 ENCOUNTER — OFFICE VISIT (OUTPATIENT)
Dept: FAMILY MEDICINE | Facility: OTHER | Age: 54
End: 2024-04-17
Payer: COMMERCIAL

## 2024-04-17 VITALS
DIASTOLIC BLOOD PRESSURE: 66 MMHG | OXYGEN SATURATION: 97 % | TEMPERATURE: 97.4 F | RESPIRATION RATE: 16 BRPM | SYSTOLIC BLOOD PRESSURE: 110 MMHG | HEIGHT: 61 IN | WEIGHT: 190 LBS | HEART RATE: 88 BPM | BODY MASS INDEX: 35.87 KG/M2

## 2024-04-17 DIAGNOSIS — Z12.31 VISIT FOR SCREENING MAMMOGRAM: ICD-10-CM

## 2024-04-17 DIAGNOSIS — N89.8 VAGINAL DISCHARGE: ICD-10-CM

## 2024-04-17 DIAGNOSIS — D35.2 PITUITARY MICROADENOMA WITH HYPERPROLACTINEMIA (H): ICD-10-CM

## 2024-04-17 DIAGNOSIS — E83.118 SECONDARY HEMOCHROMATOSIS: Primary | ICD-10-CM

## 2024-04-17 DIAGNOSIS — R79.89 ELEVATED PTHRP LEVEL: ICD-10-CM

## 2024-04-17 DIAGNOSIS — E22.9 PITUITARY MICROADENOMA WITH HYPERPROLACTINEMIA (H): ICD-10-CM

## 2024-04-17 DIAGNOSIS — E55.9 VITAMIN D DEFICIENCY: ICD-10-CM

## 2024-04-17 DIAGNOSIS — D50.0 IRON DEFICIENCY ANEMIA DUE TO CHRONIC BLOOD LOSS: ICD-10-CM

## 2024-04-17 DIAGNOSIS — Z12.4 CERVICAL CANCER SCREENING: ICD-10-CM

## 2024-04-17 DIAGNOSIS — E66.01 MORBID OBESITY (H): ICD-10-CM

## 2024-04-17 DIAGNOSIS — E53.1 VITAMIN B6 DEFICIENCY: ICD-10-CM

## 2024-04-17 DIAGNOSIS — E78.5 HYPERLIPIDEMIA LDL GOAL <130: ICD-10-CM

## 2024-04-17 DIAGNOSIS — E03.9 HYPOTHYROIDISM, UNSPECIFIED TYPE: ICD-10-CM

## 2024-04-17 PROBLEM — H59.89: Status: RESOLVED | Noted: 2017-04-06 | Resolved: 2024-04-17

## 2024-04-17 PROBLEM — K59.09 CHRONIC CONSTIPATION: Status: ACTIVE | Noted: 2017-01-27

## 2024-04-17 LAB
ALBUMIN SERPL BCG-MCNC: 4.3 G/DL (ref 3.5–5.2)
ALP SERPL-CCNC: 82 U/L (ref 40–150)
ALT SERPL W P-5'-P-CCNC: 22 U/L (ref 0–50)
ANION GAP SERPL CALCULATED.3IONS-SCNC: 9 MMOL/L (ref 7–15)
AST SERPL W P-5'-P-CCNC: 20 U/L (ref 0–45)
BILIRUB SERPL-MCNC: 0.2 MG/DL
BUN SERPL-MCNC: 17.3 MG/DL (ref 6–20)
CALCIUM SERPL-MCNC: 9.4 MG/DL (ref 8.6–10)
CHLORIDE SERPL-SCNC: 104 MMOL/L (ref 98–107)
CHOLEST SERPL-MCNC: 199 MG/DL
CLUE CELLS: ABNORMAL
CREAT SERPL-MCNC: 0.6 MG/DL (ref 0.51–0.95)
DEPRECATED HCO3 PLAS-SCNC: 29 MMOL/L (ref 22–29)
EGFRCR SERPLBLD CKD-EPI 2021: >90 ML/MIN/1.73M2
ERYTHROCYTE [DISTWIDTH] IN BLOOD BY AUTOMATED COUNT: 12.6 % (ref 10–15)
FASTING STATUS PATIENT QL REPORTED: NO
FERRITIN SERPL-MCNC: 400 NG/ML (ref 11–328)
GLUCOSE SERPL-MCNC: 82 MG/DL (ref 70–99)
HCT VFR BLD AUTO: 41.8 % (ref 35–47)
HDLC SERPL-MCNC: 42 MG/DL
HGB BLD-MCNC: 13.5 G/DL (ref 11.7–15.7)
LDLC SERPL CALC-MCNC: 123 MG/DL
MCH RBC QN AUTO: 29.5 PG (ref 26.5–33)
MCHC RBC AUTO-ENTMCNC: 32.3 G/DL (ref 31.5–36.5)
MCV RBC AUTO: 91 FL (ref 78–100)
NONHDLC SERPL-MCNC: 157 MG/DL
PLATELET # BLD AUTO: 258 10E3/UL (ref 150–450)
POTASSIUM SERPL-SCNC: 4.4 MMOL/L (ref 3.4–5.3)
PROT SERPL-MCNC: 6.9 G/DL (ref 6.4–8.3)
RBC # BLD AUTO: 4.58 10E6/UL (ref 3.8–5.2)
SODIUM SERPL-SCNC: 142 MMOL/L (ref 135–145)
TRICHOMONAS, WET PREP: ABNORMAL
TRIGL SERPL-MCNC: 170 MG/DL
TSH SERPL DL<=0.005 MIU/L-ACNC: 2.8 UIU/ML (ref 0.3–4.2)
WBC # BLD AUTO: 7.9 10E3/UL (ref 4–11)
WBC'S/HIGH POWER FIELD, WET PREP: ABNORMAL
YEAST, WET PREP: ABNORMAL

## 2024-04-17 PROCEDURE — 84443 ASSAY THYROID STIM HORMONE: CPT | Performed by: FAMILY MEDICINE

## 2024-04-17 PROCEDURE — 87624 HPV HI-RISK TYP POOLED RSLT: CPT | Performed by: FAMILY MEDICINE

## 2024-04-17 PROCEDURE — 80053 COMPREHEN METABOLIC PANEL: CPT | Performed by: FAMILY MEDICINE

## 2024-04-17 PROCEDURE — 87210 SMEAR WET MOUNT SALINE/INK: CPT | Performed by: FAMILY MEDICINE

## 2024-04-17 PROCEDURE — 80061 LIPID PANEL: CPT | Performed by: FAMILY MEDICINE

## 2024-04-17 PROCEDURE — 83970 ASSAY OF PARATHORMONE: CPT | Performed by: FAMILY MEDICINE

## 2024-04-17 PROCEDURE — 82306 VITAMIN D 25 HYDROXY: CPT | Performed by: FAMILY MEDICINE

## 2024-04-17 PROCEDURE — 84146 ASSAY OF PROLACTIN: CPT | Performed by: FAMILY MEDICINE

## 2024-04-17 PROCEDURE — 99214 OFFICE O/P EST MOD 30 MIN: CPT | Performed by: FAMILY MEDICINE

## 2024-04-17 PROCEDURE — G0145 SCR C/V CYTO,THINLAYER,RESCR: HCPCS | Performed by: FAMILY MEDICINE

## 2024-04-17 PROCEDURE — 36415 COLL VENOUS BLD VENIPUNCTURE: CPT | Performed by: FAMILY MEDICINE

## 2024-04-17 PROCEDURE — 84207 ASSAY OF VITAMIN B-6: CPT | Mod: 90 | Performed by: FAMILY MEDICINE

## 2024-04-17 PROCEDURE — 87491 CHLMYD TRACH DNA AMP PROBE: CPT | Performed by: FAMILY MEDICINE

## 2024-04-17 PROCEDURE — 82607 VITAMIN B-12: CPT | Performed by: FAMILY MEDICINE

## 2024-04-17 PROCEDURE — 87591 N.GONORRHOEAE DNA AMP PROB: CPT | Performed by: FAMILY MEDICINE

## 2024-04-17 PROCEDURE — 85027 COMPLETE CBC AUTOMATED: CPT | Performed by: FAMILY MEDICINE

## 2024-04-17 PROCEDURE — 99000 SPECIMEN HANDLING OFFICE-LAB: CPT | Performed by: FAMILY MEDICINE

## 2024-04-17 PROCEDURE — 82728 ASSAY OF FERRITIN: CPT | Performed by: FAMILY MEDICINE

## 2024-04-17 RX ORDER — ESTRADIOL 10 UG/1
10 INSERT VAGINAL
COMMUNITY
Start: 2024-03-15

## 2024-04-17 ASSESSMENT — PAIN SCALES - GENERAL: PAINLEVEL: NO PAIN (0)

## 2024-04-17 NOTE — PROGRESS NOTES
Assessment & Plan         ICD-10-CM    1. Secondary hemochromatosis  E83.118       2. Vitamin D deficiency  E55.9 Vitamin D Deficiency     DEXA HIP/PELVIS/SPINE - Future     Vitamin D Deficiency      3. Hyperlipidemia LDL goal <130  E78.5 Lipid panel reflex to direct LDL Non-fasting     Comprehensive metabolic panel (BMP + Alb, Alk Phos, ALT, AST, Total. Bili, TP)     Lipid panel reflex to direct LDL Non-fasting     Comprehensive metabolic panel (BMP + Alb, Alk Phos, ALT, AST, Total. Bili, TP)      4. Visit for screening mammogram  Z12.31 MA SCREENING DIGITAL BILAT - Future  (s+30)      5. Iron deficiency anemia due to chronic blood loss  D50.0 Vitamin B12     Ferritin     CBC with platelets     Vitamin B12     Ferritin     CBC with platelets      6. Morbid obesity (H)  E66.01       7. Pituitary microadenoma with hyperprolactinemia (H)  D35.2 Prolactin    E22.9 Prolactin      8. Vaginal discharge  N89.8 Wet prep     NEISSERIA GONORRHOEA PCR     CHLAMYDIA TRACHOMATIS PCR     NEISSERIA GONORRHOEA PCR     CHLAMYDIA TRACHOMATIS PCR     CANCELED: NEISSERIA GONORRHOEA PCR     CANCELED: CHLAMYDIA TRACHOMATIS PCR      9. Elevated PTHrP level  R79.89 Parathyroid Hormone Intact     Parathyroid Hormone Intact      10. Hypothyroidism, unspecified type  E03.9 TSH with free T4 reflex     TSH with free T4 reflex      11. Vitamin B6 deficiency  E53.1 Vitamin B6     Vitamin B6      12. Cervical cancer screening  Z12.4 Pap screen with HPV - recommended age 30 - 65 years          Notes reviewed from endocrinology under history to determine the labs that we are due for follow-up and we will continue to monitor her secondary hemochromatosis, pituitary microadenoma with hyperprolactinemia, and secondary hyperparathyroidism as well as her vitamin deficiencies.  Labs are due and will be provided today.  We will need to adjust her treatment plans based on these results.  She also has been having increased vaginal discharge and a wet prep  "was done today which was normal.  She is also due for gonorrhea and Chlamydia testing due to the symptoms and has agreed to this today as well      No LOS data to display   Time spent by me doing chart review, history and exam, documentation and further activities per the note    Brittanie Elkins MD             BMI  Estimated body mass index is 35.9 kg/m  as calculated from the following:    Height as of this encounter: 1.549 m (5' 1\").    Weight as of this encounter: 86.2 kg (190 lb).   Weight management plan: Discussed healthy diet and exercise guidelines          Gena Quintanilla is a 53 year old, presenting for the following health issues:  Follow Up (Lab work) and Vaginal Problem      4/17/2024     9:17 AM   Additional Questions   Roomed by Carissa     Vaginal Problem     History of Present Illness       Hyperlipidemia:  She presents for follow up of hyperlipidemia.   She is taking medication to lower cholesterol. She is not having myalgia or other side effects to statin medications.    Hypertension: She presents for follow up of hypertension.  She does not check blood pressure  regularly outside of the clinic. Outpatient blood pressures have not been over 140/90. She does not follow a low salt diet.     Hypothyroidism:     Since last visit, patient describes the following symptoms::  Dry skin, Fatigue and Hair loss    Headaches:   Since the patient's last clinic visit, headaches are: no change  The patient is getting headaches:  Once a month  She is able to do normal daily activities when she has a migraine.  The patient is taking the following rescue/relief medications:  Naproxyn (Aleve)   Patient states \"I get total relief\" from the rescue/relief medications.   The patient is taking the following medications to prevent migraines:  Other  In the past 4 weeks, the patient has gone to an Urgent Care or Emergency Room 0 times times due to headaches.    She eats 2-3 servings of fruits and vegetables daily.She " "consumes 1 sweetened beverage(s) daily.She exercises with enough effort to increase her heart rate 9 or less minutes per day.  She exercises with enough effort to increase her heart rate 3 or less days per week.   She is taking medications regularly.     Patient follows with endocrine and has been following for her secondary hemochromatosis as well as her secondary  hyperparathyroidism and her nutritional deficiencies.  All of which she would like primary care to continue to follow and labs are due.    Vaginal Symptoms  Onset/Duration: 2 weeks ago  Description:  Vaginal Discharge: white creamy bleeding after intercourse    Itching (Pruritis): No  Burning sensation:  No  Odor: No  Accompanying Signs & Symptoms:  Urinary symptoms: No  Abdominal pain: No  Fever: No  History:   Sexually active: YES  New Partner: YES  Possibility of Pregnancy:  No  Recent antibiotic use: No  Previous vaginitis issues: YES  Precipitating or alleviating factors: None  Therapies tried and outcome: none            Objective    /66   Pulse 88   Temp 97.4  F (36.3  C) (Temporal)   Resp 16   Ht 1.549 m (5' 1\")   Wt 86.2 kg (190 lb)   LMP 03/20/2020   SpO2 97%   BMI 35.90 kg/m    Body mass index is 35.9 kg/m .  Physical Exam   GENERAL: alert and no distress  RESP: lungs clear to auscultation - no rales, rhonchi or wheezes  CV: regular rate and rhythm, normal S1 S2, no S3 or S4, no murmur, click or rub, no peripheral edema  ABDOMEN: soft, nontender, no hepatosplenomegaly, no masses and bowel sounds normal   (female) w/bimanual: normal female external genitalia, normal urethral meatus, normal vaginal mucosa, and normal cervix/adnexa/uterus without masses or discharge  MS: no gross musculoskeletal defects noted, no edema  SKIN: no suspicious lesions or rashes  NEURO: Normal strength and tone, mentation intact and speech normal  PSYCH: mentation appears normal, affect normal/bright            Signed Electronically by: Brittanie Elkins MD, " MD

## 2024-04-18 LAB
C TRACH DNA SPEC QL NAA+PROBE: NEGATIVE
N GONORRHOEA DNA SPEC QL NAA+PROBE: NEGATIVE
PROLACTIN SERPL 3RD IS-MCNC: 8 NG/ML (ref 5–23)
PTH-INTACT SERPL-MCNC: 25 PG/ML (ref 15–65)
VIT B12 SERPL-MCNC: 683 PG/ML (ref 232–1245)
VIT D+METAB SERPL-MCNC: 29 NG/ML (ref 20–50)

## 2024-04-19 ENCOUNTER — MYC MEDICAL ADVICE (OUTPATIENT)
Dept: FAMILY MEDICINE | Facility: OTHER | Age: 54
End: 2024-04-19
Payer: COMMERCIAL

## 2024-04-19 LAB
BKR LAB AP GYN ADEQUACY: NORMAL
BKR LAB AP GYN INTERPRETATION: NORMAL
BKR LAB AP HPV REFLEX: NORMAL
BKR LAB AP PREVIOUS ABNORMAL: NORMAL
PATH REPORT.COMMENTS IMP SPEC: NORMAL
PATH REPORT.COMMENTS IMP SPEC: NORMAL
PATH REPORT.RELEVANT HX SPEC: NORMAL

## 2024-04-20 LAB — PYRIDOXAL PHOS SERPL-SCNC: 42.8 NMOL/L

## 2024-04-23 ENCOUNTER — PATIENT OUTREACH (OUTPATIENT)
Dept: FAMILY MEDICINE | Facility: OTHER | Age: 54
End: 2024-04-23
Payer: COMMERCIAL

## 2024-04-23 PROBLEM — R87.810 CERVICAL HIGH RISK HPV (HUMAN PAPILLOMAVIRUS) TEST POSITIVE: Status: ACTIVE | Noted: 2024-04-17

## 2024-04-23 LAB
HUMAN PAPILLOMA VIRUS 16 DNA: NEGATIVE
HUMAN PAPILLOMA VIRUS 18 DNA: NEGATIVE
HUMAN PAPILLOMA VIRUS FINAL DIAGNOSIS: ABNORMAL
HUMAN PAPILLOMA VIRUS OTHER HR: POSITIVE

## 2024-05-16 ENCOUNTER — ANCILLARY PROCEDURE (OUTPATIENT)
Dept: BONE DENSITY | Facility: CLINIC | Age: 54
End: 2024-05-16
Attending: FAMILY MEDICINE
Payer: COMMERCIAL

## 2024-05-16 ENCOUNTER — ANCILLARY PROCEDURE (OUTPATIENT)
Dept: MAMMOGRAPHY | Facility: CLINIC | Age: 54
End: 2024-05-16
Attending: FAMILY MEDICINE
Payer: COMMERCIAL

## 2024-05-16 ENCOUNTER — OFFICE VISIT (OUTPATIENT)
Dept: NEUROLOGY | Facility: CLINIC | Age: 54
End: 2024-05-16
Payer: COMMERCIAL

## 2024-05-16 VITALS
HEART RATE: 105 BPM | DIASTOLIC BLOOD PRESSURE: 90 MMHG | WEIGHT: 190 LBS | SYSTOLIC BLOOD PRESSURE: 124 MMHG | BODY MASS INDEX: 35.87 KG/M2 | HEIGHT: 61 IN

## 2024-05-16 DIAGNOSIS — Z12.31 VISIT FOR SCREENING MAMMOGRAM: ICD-10-CM

## 2024-05-16 DIAGNOSIS — E53.8 LOW SERUM VITAMIN B12: ICD-10-CM

## 2024-05-16 DIAGNOSIS — G43.009 MIGRAINE WITHOUT AURA AND WITHOUT STATUS MIGRAINOSUS, NOT INTRACTABLE: Primary | ICD-10-CM

## 2024-05-16 DIAGNOSIS — E55.9 VITAMIN D DEFICIENCY: ICD-10-CM

## 2024-05-16 PROCEDURE — 99215 OFFICE O/P EST HI 40 MIN: CPT | Performed by: STUDENT IN AN ORGANIZED HEALTH CARE EDUCATION/TRAINING PROGRAM

## 2024-05-16 PROCEDURE — 77067 SCR MAMMO BI INCL CAD: CPT

## 2024-05-16 PROCEDURE — 77080 DXA BONE DENSITY AXIAL: CPT | Performed by: RADIOLOGY

## 2024-05-16 PROCEDURE — 77063 BREAST TOMOSYNTHESIS BI: CPT

## 2024-05-16 PROCEDURE — G2211 COMPLEX E/M VISIT ADD ON: HCPCS | Performed by: STUDENT IN AN ORGANIZED HEALTH CARE EDUCATION/TRAINING PROGRAM

## 2024-05-16 RX ORDER — MAGNESIUM OXIDE 400 MG/1
400 TABLET ORAL DAILY
Qty: 90 TABLET | Refills: 3 | Status: SHIPPED | OUTPATIENT
Start: 2024-05-16 | End: 2024-08-15

## 2024-05-16 RX ORDER — SUMATRIPTAN 50 MG/1
50 TABLET, FILM COATED ORAL
Qty: 18 TABLET | Refills: 4 | Status: SHIPPED | OUTPATIENT
Start: 2024-05-16 | End: 2024-08-15

## 2024-05-16 RX ORDER — RIBOFLAVIN (VITAMIN B2) 400 MG
1 TABLET ORAL DAILY
Qty: 90 TABLET | Refills: 3 | Status: SHIPPED | OUTPATIENT
Start: 2024-05-16 | End: 2024-08-15

## 2024-05-16 RX ORDER — PROPRANOLOL HYDROCHLORIDE 80 MG/1
80 CAPSULE, EXTENDED RELEASE ORAL DAILY
Qty: 90 CAPSULE | Refills: 3 | Status: SHIPPED | OUTPATIENT
Start: 2024-05-16 | End: 2024-08-15

## 2024-05-16 RX ORDER — UREA 10 %
500 LOTION (ML) TOPICAL DAILY
Qty: 30 TABLET | Refills: 6 | Status: SHIPPED | OUTPATIENT
Start: 2024-05-16

## 2024-05-16 NOTE — NURSING NOTE
"Socorro Etienne's goals for this visit include:   Chief Complaint   Patient presents with    RECHECK     Migraines           She requests these members of her care team be copied on today's visit information: yes    PCP: Brittanie Elkins    Referring Provider:  Referred Self, MD  No address on file    BP (!) 124/90 (BP Location: Left arm, Patient Position: Sitting, Cuff Size: Adult Regular)   Pulse 105   Ht 1.549 m (5' 1\")   Wt 86.2 kg (190 lb)   LMP 03/20/2020   BMI 35.90 kg/m      Do you need any medication refills at today's visit? No  BENJI Munguia, RACHEL ([facility])      "

## 2024-05-16 NOTE — PROGRESS NOTES
[facility]/[place]  Section of General Neurology  Return Patient Visit    Socorro Etienne MRN# [id number]   Age: 53 year old YOB: 1970          Assessment and Plan:   Assessment:  Socorro Etienne is a pleasant 53 year old female seen in follow up today.   She had previously well controlled migraines but a questionable shock liver like event? With persistent transaminitis has lead to worsening headaches.  We discussed options, previously didn't tolerate topamax, zonisamide, SNRIs.  I think CGRP medications could be on the table if these do not improve.  For now will go with plan as below.  Discussed risk/benefit, to only use triptan once daily until AST/ALT improves (in 500s now, improved per patient, cannot see [facility] records)     Plan:  Increase propranolol 80 mg daily  Add imitrex 50 mg as needed  Future options discussed as above (would favor Nurtec e.g. or pushing propranolol higher yet)  To follow up in 3 months, reach out sooner with any issues questions or changes       Yuan Davey MD   of Neurology   [facility]      Interval history:     New Pituitary MRI reassuring  Headaches are much worse she notes.    Lab work good.   May relate to blood pressure fluctuations.   Had brief shock liver event.    Working a lot of hours.    Naproxen--still helps as needed  No imitrex prior   Cymbalta, effexor didn't sit with her well  Nortriptyline discussed too.   Topamax/zonisamide--side effects.    Gabapentin-can gain weight--- never tried.    Nurtec discussed.      Seeing [facility] for HOWELL, brief AST/ALT flare      A/P at previous visit  Socorro Etienne is a pleasant 52 year old female seen in follow up today.  Migraine headaches remain much improved, excellent news.  She also has a previously prolactin secreting pituitary microadenoma (previously on dopaminergic therapy in this regard) that has been stable to possible smaller on last imaging.   No clear compression of optic chiasm.  Encouraged continued optometry/ophthalmology cares in surveillance of visual fields/OCT and otherwise.  I think it would be a good idea to update imaging in this regard.  I think if remains stable could space out imaging a tad farther given duration of stability.   We reviewed this work up to date.  Overall I am pleased with how she is doing.  Will continue surveillance and see how she does over time.       Plan:  Continue propranolol 60 mg daily   Magnesium oxide 400 mg Riboflavin (vitamin b2) 400 mg daily for migraine prevention  Recheck MRI brain/pituitary protocol, pending results could space out the MRIs farther.   Follow up in 1 year, to reach out sooner with any changes or questions    Past Medical History:     Patient Active Problem List   Diagnosis    Infertility    Oligomenorrhea    Hyperprolactinemia (H24)    Obesity    Hypothyroidism    PCOS (polycystic ovarian syndrome)    Pituitary microadenoma with hyperprolactinemia (H)    Female hirsutism    Hyperlipidemia LDL goal <130    History of depression    Abdominal pain, right lower quadrant    Family history of cervical cancer    Family history of colon cancer    Iron deficiency anemia due to chronic blood loss    Vitamin D deficiency    Ovarian cyst    MTHFR gene mutation    Hiatal hernia    Morbid obesity (H)    Secondary hemochromatosis    Fatty liver    Vitamin B12 deficiency (non anemic)    Chronic constipation    Reflux esophagitis    Cervical high risk HPV (human papillomavirus) test positive     Past Medical History:   Diagnosis Date    Anemia     ASCUS on Pap smear 2/24/09    + HPV 54 (low risk).  repeat pap in 2010 was NIL    Family history of colon cancer 2/3/2014    Family history of colon cancer     Family history of colon cancer     Fibrocystic breast disease     Gastroesophageal reflux disease     Hypertension     pt stopped BP med with pregnancy    Hypothyroid     Hypothyroidism 2003    Mild or  unspecified pre-eclampsia, with delivery     PCOS (polycystic ovarian syndrome)     Prolactin increased         Past Surgical History:     Past Surgical History:   Procedure Laterality Date    ABDOMEN SURGERY          BACK SURGERY  No surgeries    cervical/lumbar history of treatment    BIOPSY BREAST Left     BREAST SURGERY      CHOLECYSTECTOMY      DILATION AND CURETTAGE SUCTION  10/19/2012    Procedure: DILATION AND CURETTAGE SUCTION;  SUCTION D&C;  Surgeon: Muriel Purcell MD;  Location: [facility]    GENITOURINARY SURGERY      GI SURGERY      Gallbladder    HC REMOVAL GALLBLADDER  10/2003    HC TOOTH EXTRACTION W/FORCEP      LAPAROSCOPIC SALPINGECTOMY Left 2017    Procedure: LAPAROSCOPIC SALPINGECTOMY;  Surgeon: Muriel Purcell MD;  Location: [facility]    LAPAROSCOPIC SALPINGO-OOPHORECTOMY Right 2017    Procedure: LAPAROSCOPIC SALPINGO-OOPHORECTOMY;  Surgeon: Muriel Purcell MD;  Location: [facility]    LAPAROSCOPY DIAGNOSTIC (GYN)  10/25/2013    Procedure: LAPAROSCOPY DIAGNOSTIC (GYN);  DIAGNOSTIC LAPAROSCOPY;  Surgeon: Muriel Purcell MD;  Location: [facility]    OOPHORECTOMY Right     ORTHOPEDIC SURGERY  no surgeries    BRUNA wrists, RT shoulder, BRUNA ankles/feet    SALPINGOOPHORECTOMY Bilateral     SOFT TISSUE SURGERY      WISDOM TOOTH EXTRACTION      ZZC  DELIVERY ONLY      [facility] BIOPSY OF BREAST, OPEN INCISIONAL  1999    [facility] COLONOSCOPY THRU STOMA, DIAGNOSTIC      normal        Social History:     Social History     Tobacco Use    Smoking status: Never    Smokeless tobacco: Never   Vaping Use    Vaping status: Never Used   Substance Use Topics    Alcohol use: No    Drug use: No        Family History:     Family History   Problem Relation Age of Onset    Heart Disease Mother         MI @ age 53, stented    Coronary Artery Disease Mother     Hypertension Mother     Depression Mother     Anxiety Disorder Mother     Mental Illness Mother      Thyroid Disease Mother     Obesity Mother     Diabetes Mother     Hyperlipidemia Mother     Parkinsonism Mother     Cardiovascular Father         Bypass in his late 50's    Diabetes Father     Cancer - colorectal Father         in his 50's, small bowel resection    Coronary Artery Disease Father     Cerebrovascular Disease Father     Colon Cancer Father     Hypertension Sister     Cervical Cancer Sister     Thyroid Disease Sister     Psychotic Disorder Sister         bipolar    Emphysema Sister     Lupus Sister     Psychotic Disorder Brother         poss schizophrenia    Heart Disease Brother         murmur    Coronary Artery Disease Maternal Grandfather     Coronary Artery Disease Paternal Grandmother     Osteoporosis Paternal Grandmother     Alzheimer Disease Paternal Grandmother     Hypertension Maternal Half-Sister     Anxiety Disorder Maternal Half-Sister     Substance Abuse Maternal Half-Sister     Mental Illness Maternal Half-Sister     Asthma Maternal Half-Sister         Emphysema    Thyroid Disease Maternal Half-Sister     Obesity Maternal Half-Sister     Lupus Maternal Half-Sister     Emphysema Maternal Half-Sister     Cervical Cancer Maternal Half-Sister     Breast Cancer Other     Substance Abuse Other     Other Cancer Maternal Half-Sister     Anxiety Disorder Paternal Half-Brother     Mental Illness Maternal Grandmother     Alzheimer Disease Maternal Grandmother     Depression Maternal Grandmother     Asthma Son     Obesity Other     Multiple Sclerosis Other     Asthma Niece     Depression Niece     Hypertension Son     Hyperlipidemia Son     Depression Son     Anxiety Disorder Son     Asthma Son     Genetic Disorder Son         MTHFR    Thyroid Disease Son     Obesity Son     Heart Failure Maternal Uncle         Hypertrophic Obstructive Cardiomyopathy    Multiple Sclerosis Paternal Aunt     Pancreatic Cancer Paternal Aunt     Colon Cancer Cousin         colon cancer w resection    Depression Nephew      Substance Abuse Other     Genetic Disorder Other         HOCM    Multiple Sclerosis Paternal Aunt     Unknown/Adopted No family hx of         I was adopted and do not have Yifan's history        Medications:     Current Outpatient Medications   Medication Sig Dispense Refill    B-D U/F insulin pen needle       budesonide (PULMICORT) 0.5 MG/2ML neb solution       COMBIPATCH 0.05-0.14 MG/DAY bi-weekly patch APPLY 1 PATCH TWICE WEEKLY      cyanocobalamin (VITAMIN B-12) 500 MCG tablet Take 1 tablet (500 mcg) by mouth daily 30 tablet 6    estradiol (VAGIFEM) 10 MCG TABS vaginal tablet Place 10 mcg vaginally twice a week      fish oil-omega-3 fatty acids 1000 MG capsule Take 2 g by mouth daily      LINZESS 72 MCG capsule take 1 capsule by oral route  every day on an empty stomach at least 30 minutes before 1st meal of the day      liothyronine (CYTOMEL) 5 MCG tablet       liothyronine (CYTOMEL) 5 MCG tablet Take 1 tablet by mouth daily at 2 pm      magnesium oxide (MAG-OX) 400 MG tablet Take 1 tablet (400 mg) by mouth daily 90 tablet 3    metFORMIN (GLUCOPHAGE-XR) 500 MG 24 hr tablet Take 1,000 mg by mouth 2 times daily (with meals)       naproxen (NAPROSYN) 500 MG tablet TAKE 1 TABLET BY MOUTH AS NEEDED FOR HEADACHE 15 tablet 3    Omega 3-6-9 Fatty Acids (OMEGA 3-6-9 COMPLEX PO) Take 1 tablet by mouth daily      omeprazole (PRILOSEC) 40 MG DR capsule Take 40 mg by mouth daily      propranolol ER (INDERAL LA) 60 MG 24 hr capsule Take 1 capsule (60 mg) by mouth daily 90 capsule 3    Riboflavin 400 MG TABS Take 1 tablet by mouth daily 90 tablet 3    SYNTHROID 100 MCG tablet Take 100 mcg by mouth daily      tiZANidine (ZANAFLEX) 4 MG tablet TAKE 1 TO 2 TABLETS BY MOUTH EVERY 12 HOURS AS NEEDED FOR MUSCLE SPASMS      vitamin D3 (CHOLECALCIFEROL) 2000 units (50 mcg) tablet Take 1 tablet (2,000 Units) by mouth daily 90 tablet 3    vitamin E 400 units TABS Take 400 Units by mouth daily      WEGOVY 2.4 MG/0.75ML pen        No  "current facility-administered medications for this visit.        Allergies:     Allergies   Allergen Reactions    Latex Other (See Comments)     Irritation on skin    Venlafaxine     Zonisamide           Physical Exam:   Vitals: BP (!) 124/90 (BP Location: Left arm, Patient Position: Sitting, Cuff Size: Adult Regular)   Pulse 105   Ht 1.549 m (5' 1\")   Wt 86.2 kg (190 lb)   LMP 03/20/2020   BMI 35.90 kg/m       Neuro:   General Appearance: No apparent distress, well-nourished, well-groomed, pleasant     Mental Status: Alert and oriented to person, place, and time. Speech fluent and comprehension intact. No dysarthria.      Cranial Nerves:   II: Visual fields: normal  III: Pupils: 3 mm, equal, round, reactive to light   III,IV,VI: Extraocular Movements: intact   V: Facial sensation: intact to light touch  VII: Facial strength: intact without asymmetry    Motor Exam:   5/5 diffusely     Sensory: intact to light touch    Coordination: no dysmetria noted    Reflexes: biceps, triceps, brachioradialis, patellar 2+ and symmetric.          Data: Pertinent prior to visit   Imaging:  Brain/ Pituitary MRI without and with contrast 12/2021     History: PITUITARY   COMPARE TO PREVIOUS; Pituitary disease (H).     ICD-10: Pituitary disease (H)     Comparison:  50 degrees the brain exams from 12/23/2020, 11/2/2019 and  10/25/2018.      Technique: Axial diffusion and FLAIR images of the whole brain  obtained without intravenous contrast. Sagittal T1 and T2-weighted,  coronal T2-weighted, coronal T1-weighted images with focus on the  sella were obtained without intravenous contrast. Post intravenous  contrast using gadolinium coronal and sagittal T1-weighted images were  obtained focused on the sella. Dynamic postcontrast coronal  T1-weighted images were also obtained.     Contrast: 10 cc Gadavist.     Findings:    No mass is demonstrated within the sella. No abnormality identified at  the site of the previously reported " hypoenhancement at the right  posterior inferior aspect of the sella. The pituitary stalk appears  midline.. T1 bright spot of the neurohypophysis is present. The optic  chiasm appears intact and not displaced.  The major cavernous carotid vascular flow-voids appear patent.       Focus of T2 hyperintensity within the left frontal deep white matter,  unchanged compared to prior study, nonspecific, differential includes  sequela of infectious inflammatory process, or vasculopathy, or  chronic small vessel ischemic disease. No mass effect, midline shift,  or significant enlargement of the ventricles.  Mucus retention cyst within the right maxillary sinus. The mastoid air  cells are clear.                                                                      Impression: No evidence of mass within the sella. No abnormality  identified at the site of the previously reported hypoenhancement at  the right posterior inferior aspect of the sella.      I personally reviewed the above imaging and agree with the findings in the report. Previously noted pituitary microadenoma is difficult to appreciate on 2021 imaging     2018 MRI--reviewed with patient with regards to small t2 hyperintensity, not pertinent/relevant.  Age expected                 Brain/ Pituitary MRI without and with contrast  2023     History: to compare/follow up on pituitary microadenoma; Pituitary  microadenoma with hyperprolactinemia (H); Pituitary microadenoma with  hyperprolactinemia (H).  ICD-10: Pituitary microadenoma with hyperprolactinemia (H); Pituitary  microadenoma with hyperprolactinemia (H)     Comparison:  Multiple studies dating back to MRI 10/25/2018      Technique: Axial diffusion and FLAIR images of the whole brain  obtained without intravenous contrast. Sagittal T1 and T2-weighted,  coronal T2-weighted, coronal T1-weighted images with focus on the  sella were obtained without intravenous contrast. Post intravenous  contrast using gadolinium  coronal and sagittal T1-weighted images were  obtained focused on the sella. Dynamic postcontrast coronal  T1-weighted images were also obtained.     Contrast: 8ml Gadavist     Findings:    No mass is demonstrated within the sella. The pituitary stalk appears  midline. The optic chiasm appears intact and not displaced.  The major cavernous carotid vascular flow-voids appear patent.       Regarding the remainder brain parenchyma; mild prominent subarachnoid  space overlying the frontal convexity, unchanged. A few scattered  nonspecific subcortical and deep white matter hyperintensities  compatible with mild leukoaraiosis.  Stable mucinous retention cyst within right maxillary sinus.                                                                      Impression: No evidence of mass within the sella with particular  attention paid to the right posterior inferior aspect of the sella.       Procedures:      Laboratory:           The total time of this encounter today amounted to 40 minutes. This time included time spent with the patient, prep work, ordering tests, and performing post visit documentation.    The longitudinal plan of care for migraine headaches was addressed during this visit. Due to the added complexity in care, I will continue to support Ms. Etienne in the subsequent management of this condition(s) and with the ongoing continuity of care of this condition(s).

## 2024-05-16 NOTE — LETTER
5/16/2024         RE: Socorro Etienne  [address]  [address] [address]  [facility] [address]        Dear Colleague,    Thank you for referring your patient, Socorro Etienne, to the [facility] [place]. Please see a copy of my visit note below.    [facility]  Section of General Neurology  Return Patient Visit    Socorro Etienne MRN# [id number]   Age: 53 year old YOB: 1970          Assessment and Plan:   Assessment:  Socorro Etienne is a pleasant 53 year old female seen in follow up today.   She had previously well controlled migraines but a questionable shock liver like event? With persistent transaminitis has lead to worsening headaches.  We discussed options, previously didn't tolerate topamax, zonisamide, SNRIs.  I think CGRP medications could be on the table if these do not improve.  For now will go with plan as below.  Discussed risk/benefit, to only use triptan once daily until AST/ALT improves (in 500s now, improved per patient, cannot see [facility] records)     Plan:  Increase propranolol 80 mg daily  Add imitrex 50 mg as needed  Future options discussed as above (would favor Nurtec e.g. or pushing propranolol higher yet)  To follow up in 3 months, reach out sooner with any issues questions or changes       Yuan Davey MD   of Neurology   [facility]      Interval history:     New Pituitary MRI reassuring  Headaches are much worse she notes.    Lab work good.   May relate to blood pressure fluctuations.   Had brief shock liver event.    Working a lot of hours.    Naproxen--still helps as needed  No imitrex prior   Cymbalta, effexor didn't sit with her well  Nortriptyline discussed too.   Topamax/zonisamide--side effects.    Gabapentin-can gain weight--- never tried.    Nurtec discussed.      Seeing [facility] for HOWELL, brief AST/ALT flare      A/P at previous visit  Socorro Etienne is a pleasant 52 year old  female seen in follow up today.  Migraine headaches remain much improved, excellent news.  She also has a previously prolactin secreting pituitary microadenoma (previously on dopaminergic therapy in this regard) that has been stable to possible smaller on last imaging.  No clear compression of optic chiasm.  Encouraged continued optometry/ophthalmology cares in surveillance of visual fields/OCT and otherwise.  I think it would be a good idea to update imaging in this regard.  I think if remains stable could space out imaging a tad farther given duration of stability.   We reviewed this work up to date.  Overall I am pleased with how she is doing.  Will continue surveillance and see how she does over time.       Plan:  Continue propranolol 60 mg daily   Magnesium oxide 400 mg Riboflavin (vitamin b2) 400 mg daily for migraine prevention  Recheck MRI brain/pituitary protocol, pending results could space out the MRIs farther.   Follow up in 1 year, to reach out sooner with any changes or questions    Past Medical History:     Patient Active Problem List   Diagnosis     Infertility     Oligomenorrhea     Hyperprolactinemia (H24)     Obesity     Hypothyroidism     PCOS (polycystic ovarian syndrome)     Pituitary microadenoma with hyperprolactinemia (H)     Female hirsutism     Hyperlipidemia LDL goal <130     History of depression     Abdominal pain, right lower quadrant     Family history of cervical cancer     Family history of colon cancer     Iron deficiency anemia due to chronic blood loss     Vitamin D deficiency     Ovarian cyst     MTHFR gene mutation     Hiatal hernia     Morbid obesity (H)     Secondary hemochromatosis     Fatty liver     Vitamin B12 deficiency (non anemic)     Chronic constipation     Reflux esophagitis     Cervical high risk HPV (human papillomavirus) test positive     Past Medical History:   Diagnosis Date     Anemia      ASCUS on Pap smear 2/24/09    + HPV 54 (low risk).  repeat pap in 2010  was NIL     Family history of colon cancer 2/3/2014     Family history of colon cancer      Family history of colon cancer      Fibrocystic breast disease      Gastroesophageal reflux disease      Hypertension     pt stopped BP med with pregnancy     Hypothyroid      Hypothyroidism      Mild or unspecified pre-eclampsia, with delivery      PCOS (polycystic ovarian syndrome)      Prolactin increased         Past Surgical History:     Past Surgical History:   Procedure Laterality Date     ABDOMEN SURGERY           BACK SURGERY  No surgeries    cervical/lumbar history of treatment     BIOPSY BREAST Left      BREAST SURGERY       CHOLECYSTECTOMY       DILATION AND CURETTAGE SUCTION  10/19/2012    Procedure: DILATION AND CURETTAGE SUCTION;  SUCTION D&C;  Surgeon: Muriel Purcell MD;  Location: [facility]     GENITOURINARY SURGERY       GI SURGERY      Gallbladder     HC REMOVAL GALLBLADDER  10/2003     HC TOOTH EXTRACTION W/FORCEP       LAPAROSCOPIC SALPINGECTOMY Left 2017    Procedure: LAPAROSCOPIC SALPINGECTOMY;  Surgeon: Muriel Purcell MD;  Location: [facility]     LAPAROSCOPIC SALPINGO-OOPHORECTOMY Right 2017    Procedure: LAPAROSCOPIC SALPINGO-OOPHORECTOMY;  Surgeon: Muriel Purcell MD;  Location: [facility]     LAPAROSCOPY DIAGNOSTIC (GYN)  10/25/2013    Procedure: LAPAROSCOPY DIAGNOSTIC (GYN);  DIAGNOSTIC LAPAROSCOPY;  Surgeon: Muriel Purcell MD;  Location: [facility]     OOPHORECTOMY Right      ORTHOPEDIC SURGERY  no surgeries    BRUNA wrists, RT shoulder, BRUNA ankles/feet     SALPINGOOPHORECTOMY Bilateral      SOFT TISSUE SURGERY       WISDOM TOOTH EXTRACTION       ZZC  DELIVERY ONLY       [facility] BIOPSY OF BREAST, OPEN INCISIONAL  1999     [facility] COLONOSCOPY THRU STOMA, DIAGNOSTIC      normal        Social History:     Social History     Tobacco Use     Smoking status: Never     Smokeless tobacco: Never   Vaping Use     Vaping status: Never Used    Substance Use Topics     Alcohol use: No     Drug use: No        Family History:     Family History   Problem Relation Age of Onset     Heart Disease Mother         MI @ age 53, stented     Coronary Artery Disease Mother      Hypertension Mother      Depression Mother      Anxiety Disorder Mother      Mental Illness Mother      Thyroid Disease Mother      Obesity Mother      Diabetes Mother      Hyperlipidemia Mother      Parkinsonism Mother      Cardiovascular Father         Bypass in his late 50's     Diabetes Father      Cancer - colorectal Father         in his 50's, small bowel resection     Coronary Artery Disease Father      Cerebrovascular Disease Father      Colon Cancer Father      Hypertension Sister      Cervical Cancer Sister      Thyroid Disease Sister      Psychotic Disorder Sister         bipolar     Emphysema Sister      Lupus Sister      Psychotic Disorder Brother         poss schizophrenia     Heart Disease Brother         murmur     Coronary Artery Disease Maternal Grandfather      Coronary Artery Disease Paternal Grandmother      Osteoporosis Paternal Grandmother      Alzheimer Disease Paternal Grandmother      Hypertension Maternal Half-Sister      Anxiety Disorder Maternal Half-Sister      Substance Abuse Maternal Half-Sister      Mental Illness Maternal Half-Sister      Asthma Maternal Half-Sister         Emphysema     Thyroid Disease Maternal Half-Sister      Obesity Maternal Half-Sister      Lupus Maternal Half-Sister      Emphysema Maternal Half-Sister      Cervical Cancer Maternal Half-Sister      Breast Cancer Other      Substance Abuse Other      Other Cancer Maternal Half-Sister      Anxiety Disorder Paternal Half-Brother      Mental Illness Maternal Grandmother      Alzheimer Disease Maternal Grandmother      Depression Maternal Grandmother      Asthma Son      Obesity Other      Multiple Sclerosis Other      Asthma Niece      Depression Niece      Hypertension Son       Hyperlipidemia Son      Depression Son      Anxiety Disorder Son      Asthma Son      Genetic Disorder Son         MTHFR     Thyroid Disease Son      Obesity Son      Heart Failure Maternal Uncle         Hypertrophic Obstructive Cardiomyopathy     Multiple Sclerosis Paternal Aunt      Pancreatic Cancer Paternal Aunt      Colon Cancer Cousin         colon cancer w resection     Depression Nephew      Substance Abuse Other      Genetic Disorder Other         HOCM     Multiple Sclerosis Paternal Aunt      Unknown/Adopted No family hx of         I was adopted and do not have Yifan's history        Medications:     Current Outpatient Medications   Medication Sig Dispense Refill     B-D U/F insulin pen needle        budesonide (PULMICORT) 0.5 MG/2ML neb solution        COMBIPATCH 0.05-0.14 MG/DAY bi-weekly patch APPLY 1 PATCH TWICE WEEKLY       cyanocobalamin (VITAMIN B-12) 500 MCG tablet Take 1 tablet (500 mcg) by mouth daily 30 tablet 6     estradiol (VAGIFEM) 10 MCG TABS vaginal tablet Place 10 mcg vaginally twice a week       fish oil-omega-3 fatty acids 1000 MG capsule Take 2 g by mouth daily       LINZESS 72 MCG capsule take 1 capsule by oral route  every day on an empty stomach at least 30 minutes before 1st meal of the day       liothyronine (CYTOMEL) 5 MCG tablet        liothyronine (CYTOMEL) 5 MCG tablet Take 1 tablet by mouth daily at 2 pm       magnesium oxide (MAG-OX) 400 MG tablet Take 1 tablet (400 mg) by mouth daily 90 tablet 3     metFORMIN (GLUCOPHAGE-XR) 500 MG 24 hr tablet Take 1,000 mg by mouth 2 times daily (with meals)        naproxen (NAPROSYN) 500 MG tablet TAKE 1 TABLET BY MOUTH AS NEEDED FOR HEADACHE 15 tablet 3     Omega 3-6-9 Fatty Acids (OMEGA 3-6-9 COMPLEX PO) Take 1 tablet by mouth daily       omeprazole (PRILOSEC) 40 MG DR capsule Take 40 mg by mouth daily       propranolol ER (INDERAL LA) 60 MG 24 hr capsule Take 1 capsule (60 mg) by mouth daily 90 capsule 3     Riboflavin 400 MG TABS Take  "1 tablet by mouth daily 90 tablet 3     SYNTHROID 100 MCG tablet Take 100 mcg by mouth daily       tiZANidine (ZANAFLEX) 4 MG tablet TAKE 1 TO 2 TABLETS BY MOUTH EVERY 12 HOURS AS NEEDED FOR MUSCLE SPASMS       vitamin D3 (CHOLECALCIFEROL) 2000 units (50 mcg) tablet Take 1 tablet (2,000 Units) by mouth daily 90 tablet 3     vitamin E 400 units TABS Take 400 Units by mouth daily       WEGOVY 2.4 MG/0.75ML pen        No current facility-administered medications for this visit.        Allergies:     Allergies   Allergen Reactions     Latex Other (See Comments)     Irritation on skin     Venlafaxine      Zonisamide           Physical Exam:   Vitals: BP (!) 124/90 (BP Location: Left arm, Patient Position: Sitting, Cuff Size: Adult Regular)   Pulse 105   Ht 1.549 m (5' 1\")   Wt 86.2 kg (190 lb)   LMP 03/20/2020   BMI 35.90 kg/m       Neuro:   General Appearance: No apparent distress, well-nourished, well-groomed, pleasant     Mental Status: Alert and oriented to person, place, and time. Speech fluent and comprehension intact. No dysarthria.      Cranial Nerves:   II: Visual fields: normal  III: Pupils: 3 mm, equal, round, reactive to light   III,IV,VI: Extraocular Movements: intact   V: Facial sensation: intact to light touch  VII: Facial strength: intact without asymmetry    Motor Exam:   5/5 diffusely     Sensory: intact to light touch    Coordination: no dysmetria noted    Reflexes: biceps, triceps, brachioradialis, patellar 2+ and symmetric.          Data: Pertinent prior to visit   Imaging:  Brain/ Pituitary MRI without and with contrast 12/2021     History: PITUITARY   COMPARE TO PREVIOUS; Pituitary disease (H).     ICD-10: Pituitary disease (H)     Comparison:  50 degrees the brain exams from 12/23/2020, 11/2/2019 and  10/25/2018.      Technique: Axial diffusion and FLAIR images of the whole brain  obtained without intravenous contrast. Sagittal T1 and T2-weighted,  coronal T2-weighted, coronal T1-weighted " images with focus on the  sella were obtained without intravenous contrast. Post intravenous  contrast using gadolinium coronal and sagittal T1-weighted images were  obtained focused on the sella. Dynamic postcontrast coronal  T1-weighted images were also obtained.     Contrast: 10 cc Gadavist.     Findings:    No mass is demonstrated within the sella. No abnormality identified at  the site of the previously reported hypoenhancement at the right  posterior inferior aspect of the sella. The pituitary stalk appears  midline.. T1 bright spot of the neurohypophysis is present. The optic  chiasm appears intact and not displaced.  The major cavernous carotid vascular flow-voids appear patent.       Focus of T2 hyperintensity within the left frontal deep white matter,  unchanged compared to prior study, nonspecific, differential includes  sequela of infectious inflammatory process, or vasculopathy, or  chronic small vessel ischemic disease. No mass effect, midline shift,  or significant enlargement of the ventricles.  Mucus retention cyst within the right maxillary sinus. The mastoid air  cells are clear.                                                                      Impression: No evidence of mass within the sella. No abnormality  identified at the site of the previously reported hypoenhancement at  the right posterior inferior aspect of the sella.      I personally reviewed the above imaging and agree with the findings in the report. Previously noted pituitary microadenoma is difficult to appreciate on 2021 imaging     2018 MRI--reviewed with patient with regards to small t2 hyperintensity, not pertinent/relevant.  Age expected                 Brain/ Pituitary MRI without and with contrast  2023     History: to compare/follow up on pituitary microadenoma; Pituitary  microadenoma with hyperprolactinemia (H); Pituitary microadenoma with  hyperprolactinemia (H).  ICD-10: Pituitary microadenoma with hyperprolactinemia  (H); Pituitary  microadenoma with hyperprolactinemia (H)     Comparison:  Multiple studies dating back to MRI 10/25/2018      Technique: Axial diffusion and FLAIR images of the whole brain  obtained without intravenous contrast. Sagittal T1 and T2-weighted,  coronal T2-weighted, coronal T1-weighted images with focus on the  sella were obtained without intravenous contrast. Post intravenous  contrast using gadolinium coronal and sagittal T1-weighted images were  obtained focused on the sella. Dynamic postcontrast coronal  T1-weighted images were also obtained.     Contrast: 8ml Gadavist     Findings:    No mass is demonstrated within the sella. The pituitary stalk appears  midline. The optic chiasm appears intact and not displaced.  The major cavernous carotid vascular flow-voids appear patent.       Regarding the remainder brain parenchyma; mild prominent subarachnoid  space overlying the frontal convexity, unchanged. A few scattered  nonspecific subcortical and deep white matter hyperintensities  compatible with mild leukoaraiosis.  Stable mucinous retention cyst within right maxillary sinus.                                                                      Impression: No evidence of mass within the sella with particular  attention paid to the right posterior inferior aspect of the sella.       Procedures:      Laboratory:           The total time of this encounter today amounted to 40 minutes. This time included time spent with the patient, prep work, ordering tests, and performing post visit documentation.    The longitudinal plan of care for migraine headaches was addressed during this visit. Due to the added complexity in care, I will continue to support Ms. Etienne in the subsequent management of this condition(s) and with the ongoing continuity of care of this condition(s).      Again, thank you for allowing me to participate in the care of your patient.        Sincerely,        David Davey MD

## 2024-05-18 ENCOUNTER — MYC MEDICAL ADVICE (OUTPATIENT)
Dept: FAMILY MEDICINE | Facility: OTHER | Age: 54
End: 2024-05-18
Payer: COMMERCIAL

## 2024-05-18 DIAGNOSIS — M85.80 OSTEOPENIA, UNSPECIFIED LOCATION: Primary | ICD-10-CM

## 2024-05-30 ENCOUNTER — PATIENT OUTREACH (OUTPATIENT)
Dept: ONCOLOGY | Facility: CLINIC | Age: 54
End: 2024-05-30
Payer: COMMERCIAL

## 2024-05-30 NOTE — PROGRESS NOTES
New Patient Oncology Nurse Navigator Note     Referring provider: Brittanie Elkins MD      Referring Clinic/Organization: [facility]      Referred to (specialty:) Genetic Counseling     Requested provider (if applicable): NA     Date Referral Received: May 29, 2024     Evaluation for:  Z80.0 (ICD-10-CM) - Family history of colon cancer     Payor: MEDICA / Plan: MEDICA PMAP / Product Type: HMO /     May 30, 2024  Referral received and reviewed.   Sent to NPS to Process.     Poppy WANGN, RN   Oncology Nurse Navigator   [facility]   [phone] / [phone]

## 2024-06-03 PROBLEM — K72.00 ISCHEMIC HEPATITIS: Status: ACTIVE | Noted: 2024-06-03

## 2024-08-15 ENCOUNTER — OFFICE VISIT (OUTPATIENT)
Dept: NEUROLOGY | Facility: CLINIC | Age: 54
End: 2024-08-15
Payer: COMMERCIAL

## 2024-08-15 VITALS
DIASTOLIC BLOOD PRESSURE: 79 MMHG | HEIGHT: 61 IN | SYSTOLIC BLOOD PRESSURE: 133 MMHG | HEART RATE: 86 BPM | WEIGHT: 185 LBS | BODY MASS INDEX: 34.93 KG/M2

## 2024-08-15 DIAGNOSIS — G43.009 MIGRAINE WITHOUT AURA AND WITHOUT STATUS MIGRAINOSUS, NOT INTRACTABLE: ICD-10-CM

## 2024-08-15 DIAGNOSIS — D35.2 PITUITARY MICROADENOMA WITH HYPERPROLACTINEMIA (H): Primary | ICD-10-CM

## 2024-08-15 DIAGNOSIS — E22.9 PITUITARY MICROADENOMA WITH HYPERPROLACTINEMIA (H): Primary | ICD-10-CM

## 2024-08-15 PROCEDURE — 99214 OFFICE O/P EST MOD 30 MIN: CPT | Performed by: STUDENT IN AN ORGANIZED HEALTH CARE EDUCATION/TRAINING PROGRAM

## 2024-08-15 PROCEDURE — G2211 COMPLEX E/M VISIT ADD ON: HCPCS | Performed by: STUDENT IN AN ORGANIZED HEALTH CARE EDUCATION/TRAINING PROGRAM

## 2024-08-15 RX ORDER — RIBOFLAVIN (VITAMIN B2) 400 MG
1 TABLET ORAL DAILY
Qty: 90 TABLET | Refills: 3 | Status: SHIPPED | OUTPATIENT
Start: 2024-08-15

## 2024-08-15 RX ORDER — MAGNESIUM OXIDE 400 MG/1
400 TABLET ORAL DAILY
Qty: 90 TABLET | Refills: 3 | Status: SHIPPED | OUTPATIENT
Start: 2024-08-15

## 2024-08-15 RX ORDER — SUMATRIPTAN 50 MG/1
50 TABLET, FILM COATED ORAL
Qty: 18 TABLET | Refills: 11 | Status: SHIPPED | OUTPATIENT
Start: 2024-08-15

## 2024-08-15 RX ORDER — PROPRANOLOL HYDROCHLORIDE 80 MG/1
80 CAPSULE, EXTENDED RELEASE ORAL DAILY
Qty: 90 CAPSULE | Refills: 3 | Status: SHIPPED | OUTPATIENT
Start: 2024-08-15

## 2024-08-15 NOTE — LETTER
8/15/2024      Socorro Etienne  [id number]  [address] [id number]  [facility] [address]      Dear Colleague,    Thank you for referring your patient, Socorro Etienne, to the [facility] [place]. Please see a copy of my visit note below.    [facility]  Section of General Neurology  Return Patient Visit    Socorro Etienne MRN# [id number]   Age: 54 year old YOB: 1970          Assessment and Plan:   Assessment:  Socorro Etienne is a pleasant 53 year old female seen in follow up today.   She had previously well controlled migraines that worsened after a bout of rather profound LFT elevation.  She is now further improved neurologically.  She is comfortable with our current plan in terms of headches.  I would supplement with CGRP next if needed, discussed.   Regarding previous pituitary mass (likely pituitary microadenoma):  Possibly decreased in size in interval scans.  Will continue to monitor radiographically.  Will plan on next scan in ~2 years (at next visit), sooner if papilledema or other issues apparent on interval eye exams which she will continue to get.       Plan:  Continue propranolol 80 mg daily  Add imitrex 50 mg as needed  As above will plan on repeat MRI after next visit in pituitary monitoring  Follow up in 1 year    Yuan Davey MD   of Neurology   [facility]      Interval history:     She has nothing to exciting to report, she jokingly notes  Reviewed her liver episode again  More mild headaches of late  Less frequent and less intense  Imitrex --only used a few times  Worried about mom's memory, personality changes    A/P at last visit  Socorro Etienne is a pleasant 53 year old female seen in follow up today.   She had previously well controlled migraines but a questionable shock liver like event? With persistent transaminitis has lead to worsening headaches.  We discussed options, previously  didn't tolerate topamax, zonisamide, SNRIs.  I think CGRP medications could be on the table if these do not improve.  For now will go with plan as below.  Discussed risk/benefit, to only use triptan once daily until AST/ALT improves (in 500s now, improved per patient, cannot see [facility] records)     Plan:  Increase propranolol 80 mg daily  Add imitrex 50 mg as needed  Future options discussed as above (would favor Nurtec e.g. or pushing propranolol higher yet)  To follow up in 3 months, reach out sooner with any issues questions or changes      Past Medical History:     Patient Active Problem List   Diagnosis     Infertility     Oligomenorrhea     Hyperprolactinemia (H24)     Obesity     Hypothyroidism     PCOS (polycystic ovarian syndrome)     Pituitary microadenoma with hyperprolactinemia (H)     Female hirsutism     Hyperlipidemia LDL goal <130     History of depression     Abdominal pain, right lower quadrant     Family history of cervical cancer     Family history of colon cancer     Iron deficiency anemia due to chronic blood loss     Vitamin D deficiency     Ovarian cyst     MTHFR gene mutation     Hiatal hernia     Morbid obesity (H)     Secondary hemochromatosis     Fatty liver     Vitamin B12 deficiency (non anemic)     Chronic constipation     Reflux esophagitis     Cervical high risk HPV (human papillomavirus) test positive     Ischemic hepatitis     Past Medical History:   Diagnosis Date     Anemia      ASCUS on Pap smear 2/24/09    + HPV 54 (low risk).  repeat pap in 2010 was NIL     Family history of colon cancer 2/3/2014     Family history of colon cancer      Family history of colon cancer      Fibrocystic breast disease      Gastroesophageal reflux disease      Hypertension     pt stopped BP med with pregnancy     Hypothyroid      Hypothyroidism 2003     Mild or unspecified pre-eclampsia, with delivery      PCOS (polycystic ovarian syndrome)      Prolactin increased         Past Surgical History:      Past Surgical History:   Procedure Laterality Date     ABDOMEN SURGERY           BACK SURGERY  No surgeries    cervical/lumbar history of treatment     BIOPSY BREAST Left      BREAST SURGERY       CHOLECYSTECTOMY       DILATION AND CURETTAGE SUCTION  10/19/2012    Procedure: DILATION AND CURETTAGE SUCTION;  SUCTION D&C;  Surgeon: Muriel Purcell MD;  Location: [facility]     GENITOURINARY SURGERY       GI SURGERY      Gallbladder     HC REMOVAL GALLBLADDER  10/2003     HC TOOTH EXTRACTION W/FORCEP       LAPAROSCOPIC SALPINGECTOMY Left 2017    Procedure: LAPAROSCOPIC SALPINGECTOMY;  Surgeon: Muriel Purcell MD;  Location: [facility]     LAPAROSCOPIC SALPINGO-OOPHORECTOMY Right 2017    Procedure: LAPAROSCOPIC SALPINGO-OOPHORECTOMY;  Surgeon: Muriel Purcell MD;  Location: [facility]     LAPAROSCOPY DIAGNOSTIC (GYN)  10/25/2013    Procedure: LAPAROSCOPY DIAGNOSTIC (GYN);  DIAGNOSTIC LAPAROSCOPY;  Surgeon: Muriel Purcell MD;  Location: [facility]     OOPHORECTOMY Right      ORTHOPEDIC SURGERY  no surgeries    BRUNA wrists, RT shoulder, BRUNA ankles/feet     SALPINGOOPHORECTOMY Bilateral      SOFT TISSUE SURGERY       WISDOM TOOTH EXTRACTION       ZZC  DELIVERY ONLY       [facility] BIOPSY OF BREAST, OPEN INCISIONAL  1999     [facility] COLONOSCOPY THRU STOMA, DIAGNOSTIC      normal        Social History:     Social History     Tobacco Use     Smoking status: Never     Smokeless tobacco: Never   Vaping Use     Vaping status: Never Used   Substance Use Topics     Alcohol use: No     Drug use: No        Family History:     Family History   Problem Relation Age of Onset     Heart Disease Mother         MI @ age 53, stented     Coronary Artery Disease Mother      Hypertension Mother      Depression Mother      Anxiety Disorder Mother      Mental Illness Mother      Thyroid Disease Mother      Obesity Mother      Diabetes Mother      Hyperlipidemia Mother       Parkinsonism Mother      Cardiovascular Father         Bypass in his late 50's     Diabetes Father      Cancer - colorectal Father         in his 50's, small bowel resection     Coronary Artery Disease Father      Cerebrovascular Disease Father      Colon Cancer Father      Hypertension Sister      Cervical Cancer Sister      Thyroid Disease Sister      Psychotic Disorder Sister         bipolar     Emphysema Sister      Lupus Sister      Psychotic Disorder Brother         poss schizophrenia     Heart Disease Brother         murmur     Coronary Artery Disease Maternal Grandfather      Coronary Artery Disease Paternal Grandmother      Osteoporosis Paternal Grandmother      Alzheimer Disease Paternal Grandmother      Hypertension Maternal Half-Sister      Anxiety Disorder Maternal Half-Sister      Substance Abuse Maternal Half-Sister      Mental Illness Maternal Half-Sister      Asthma Maternal Half-Sister         Emphysema     Thyroid Disease Maternal Half-Sister      Obesity Maternal Half-Sister      Lupus Maternal Half-Sister      Emphysema Maternal Half-Sister      Cervical Cancer Maternal Half-Sister      Breast Cancer Other      Substance Abuse Other      Other Cancer Maternal Half-Sister      Anxiety Disorder Paternal Half-Brother      Mental Illness Maternal Grandmother      Alzheimer Disease Maternal Grandmother      Depression Maternal Grandmother      Asthma Son      Obesity Other      Multiple Sclerosis Other      Asthma Niece      Depression Niece      Hypertension Son      Hyperlipidemia Son      Depression Son      Anxiety Disorder Son      Asthma Son      Genetic Disorder Son         MTHFR     Thyroid Disease Son      Obesity Son      Heart Failure Maternal Uncle         Hypertrophic Obstructive Cardiomyopathy     Multiple Sclerosis Paternal Aunt      Pancreatic Cancer Paternal Aunt      Colon Cancer Cousin         colon cancer w resection     Depression Nephew      Substance Abuse Other      Genetic  Disorder Other         HOCM     Multiple Sclerosis Paternal Aunt      Unknown/Adopted No family hx of         I was adopted and do not have Yifan's history        Medications:     Current Outpatient Medications   Medication Sig Dispense Refill     B-D U/F insulin pen needle        budesonide (PULMICORT) 0.5 MG/2ML neb solution        calcium carbonate-vitamin D (OSCAL) 500-5 MG-MCG tablet Take 1 tablet by mouth 2 times daily 90 tablet 3     COMBIPATCH 0.05-0.14 MG/DAY bi-weekly patch APPLY 1 PATCH TWICE WEEKLY       cyanocobalamin (VITAMIN B-12) 500 MCG tablet Take 1 tablet (500 mcg) by mouth daily 30 tablet 6     estradiol (VAGIFEM) 10 MCG TABS vaginal tablet Place 10 mcg vaginally twice a week       fish oil-omega-3 fatty acids 1000 MG capsule Take 2 g by mouth daily       LINZESS 72 MCG capsule take 1 capsule by oral route  every day on an empty stomach at least 30 minutes before 1st meal of the day       liothyronine (CYTOMEL) 5 MCG tablet        liothyronine (CYTOMEL) 5 MCG tablet Take 1 tablet by mouth daily at 2 pm       magnesium oxide (MAG-OX) 400 MG tablet Take 1 tablet (400 mg) by mouth daily 90 tablet 3     metFORMIN (GLUCOPHAGE-XR) 500 MG 24 hr tablet Take 1,000 mg by mouth 2 times daily (with meals)        naproxen (NAPROSYN) 500 MG tablet TAKE 1 TABLET BY MOUTH AS NEEDED FOR HEADACHE 15 tablet 3     Omega 3-6-9 Fatty Acids (OMEGA 3-6-9 COMPLEX PO) Take 1 tablet by mouth daily       omeprazole (PRILOSEC) 40 MG DR capsule Take 40 mg by mouth daily       propranolol ER (INDERAL LA) 80 MG 24 hr capsule Take 1 capsule (80 mg) by mouth daily 90 capsule 3     Riboflavin 400 MG TABS Take 1 tablet by mouth daily 90 tablet 3     SUMAtriptan (IMITREX) 50 MG tablet Take 1 tablet (50 mg) by mouth at onset of headache for migraine May repeat in 2 hours. Max 4 tablets/24 hours ordinarily, max 1 per day until liver better 18 tablet 4     SYNTHROID 100 MCG tablet Take 100 mcg by mouth daily       tiZANidine (ZANAFLEX)  "4 MG tablet TAKE 1 TO 2 TABLETS BY MOUTH EVERY 12 HOURS AS NEEDED FOR MUSCLE SPASMS       vitamin D3 (CHOLECALCIFEROL) 2000 units (50 mcg) tablet Take 1 tablet (2,000 Units) by mouth daily 90 tablet 3     vitamin E 400 units TABS Take 400 Units by mouth daily       WEGOVY 2.4 MG/0.75ML pen        No current facility-administered medications for this visit.        Allergies:     Allergies   Allergen Reactions     Latex Other (See Comments)     Irritation on skin     Venlafaxine      Zonisamide           Physical Exam:   Vitals: /79 (BP Location: Right arm, Patient Position: Sitting, Cuff Size: Adult Regular)   Pulse 86   Ht 1.549 m (5' 1\")   Wt 83.9 kg (185 lb)   LMP 03/20/2020   BMI 34.96 kg/m       Neuro:   General Appearance: No apparent distress, well-nourished, well-groomed, pleasant     Mental Status: Alert and oriented to person, place, and time. Speech fluent and comprehension intact. No dysarthria.      Cranial Nerves:   II: Visual fields: normal  III: Pupils: 3 mm, equal, round, reactive to light   III,IV,VI: Extraocular Movements: intact   V: Facial sensation: intact to light touch  VII: Facial strength: intact without asymmetry      Motor Exam:   5/5 diffusely     Sensory: intact to light touch    Coordination: no dysmetria noted    Reflexes: biceps, triceps, brachioradialis, patellar 2+ and symmetric.          Data: Pertinent prior to visit   Imaging:  Brain/ Pituitary MRI without and with contrast 12/2021     History: PITUITARY   COMPARE TO PREVIOUS; Pituitary disease (H).     ICD-10: Pituitary disease (H)     Comparison:  50 degrees the brain exams from 12/23/2020, 11/2/2019 and  10/25/2018.      Technique: Axial diffusion and FLAIR images of the whole brain  obtained without intravenous contrast. Sagittal T1 and T2-weighted,  coronal T2-weighted, coronal T1-weighted images with focus on the  sella were obtained without intravenous contrast. Post intravenous  contrast using gadolinium " coronal and sagittal T1-weighted images were  obtained focused on the sella. Dynamic postcontrast coronal  T1-weighted images were also obtained.     Contrast: 10 cc Gadavist.     Findings:    No mass is demonstrated within the sella. No abnormality identified at  the site of the previously reported hypoenhancement at the right  posterior inferior aspect of the sella. The pituitary stalk appears  midline.. T1 bright spot of the neurohypophysis is present. The optic  chiasm appears intact and not displaced.  The major cavernous carotid vascular flow-voids appear patent.       Focus of T2 hyperintensity within the left frontal deep white matter,  unchanged compared to prior study, nonspecific, differential includes  sequela of infectious inflammatory process, or vasculopathy, or  chronic small vessel ischemic disease. No mass effect, midline shift,  or significant enlargement of the ventricles.  Mucus retention cyst within the right maxillary sinus. The mastoid air  cells are clear.                                                                      Impression: No evidence of mass within the sella. No abnormality  identified at the site of the previously reported hypoenhancement at  the right posterior inferior aspect of the sella.      I personally reviewed the above imaging and agree with the findings in the report. Previously noted pituitary microadenoma is difficult to appreciate on 2021 imaging     2018 MRI--reviewed with patient with regards to small t2 hyperintensity, not pertinent/relevant.  Age expected                 Brain/ Pituitary MRI without and with contrast  2023     History: to compare/follow up on pituitary microadenoma; Pituitary  microadenoma with hyperprolactinemia (H); Pituitary microadenoma with  hyperprolactinemia (H).  ICD-10: Pituitary microadenoma with hyperprolactinemia (H); Pituitary  microadenoma with hyperprolactinemia (H)     Comparison:  Multiple studies dating back to MRI 10/25/2018       Technique: Axial diffusion and FLAIR images of the whole brain  obtained without intravenous contrast. Sagittal T1 and T2-weighted,  coronal T2-weighted, coronal T1-weighted images with focus on the  sella were obtained without intravenous contrast. Post intravenous  contrast using gadolinium coronal and sagittal T1-weighted images were  obtained focused on the sella. Dynamic postcontrast coronal  T1-weighted images were also obtained.     Contrast: 8ml Gadavist     Findings:    No mass is demonstrated within the sella. The pituitary stalk appears  midline. The optic chiasm appears intact and not displaced.  The major cavernous carotid vascular flow-voids appear patent.       Regarding the remainder brain parenchyma; mild prominent subarachnoid  space overlying the frontal convexity, unchanged. A few scattered  nonspecific subcortical and deep white matter hyperintensities  compatible with mild leukoaraiosis.  Stable mucinous retention cyst within right maxillary sinus.                                                                      Impression: No evidence of mass within the sella with particular  attention paid to the right posterior inferior aspect of the sella.       Procedures:       Laboratory:               The longitudinal plan of care for headaches, pituitary changes was addressed during this visit. Due to the added complexity in care, I will continue to support Ms. Etienne in the subsequent management of this condition(s) and with the ongoing continuity of care of this condition(s).      Again, thank you for allowing me to participate in the care of your patient.        Sincerely,        David Davey MD

## 2024-08-15 NOTE — PATIENT INSTRUCTIONS
Magnesium oxide 400 mg Riboflavin (vitamin b2) 400 mg daily  Continue propranolol 80 mg daily  Imitrex 50 mg as needed   Next option: Nurtec rather than increasing propranolol   Lets re scan you after your next appointment, sooner if your eye doctors see papilledema    5

## 2024-08-15 NOTE — NURSING NOTE
"Socorro Etienne's goals for this visit include:   Chief Complaint   Patient presents with    RECHECK     Migraines         She requests these members of her care team be copied on today's visit information: yes    PCP: Brittanie Elkins    Referring Provider:  Brittanie Elkins MD  [address] [place] [id number]    /79 (BP Location: Right arm, Patient Position: Sitting, Cuff Size: Adult Regular)   Pulse 86   Ht 1.549 m (5' 1\")   Wt 83.9 kg (185 lb)   LMP 03/20/2020   BMI 34.96 kg/m      Do you need any medication refills at today's visit? No  BENJI Munguia, CMA ([facility])      "

## 2024-08-15 NOTE — PROGRESS NOTES
[facility]/[place]  Section of General Neurology  Return Patient Visit    Socorro Etienne MRN# [id number]   Age: 54 year old YOB: 1970          Assessment and Plan:   Assessment:  Socorro Etienne is a pleasant 53 year old female seen in follow up today.   She had previously well controlled migraines that worsened after a bout of rather profound LFT elevation.  She is now further improved neurologically.  She is comfortable with our current plan in terms of headches.  I would supplement with CGRP next if needed, discussed.   Regarding previous pituitary mass (likely pituitary microadenoma):  Possibly decreased in size in interval scans.  Will continue to monitor radiographically.  Will plan on next scan in ~2 years (at next visit), sooner if papilledema or other issues apparent on interval eye exams which she will continue to get.       Plan:  Continue propranolol 80 mg daily  Add imitrex 50 mg as needed  As above will plan on repeat MRI after next visit in pituitary monitoring  Follow up in 1 year    Yuan Davey MD   of Neurology   [facility]      Interval history:     She has nothing to exciting to report, she jokingly notes  Reviewed her liver episode again  More mild headaches of late  Less frequent and less intense  Imitrex --only used a few times  Worried about mom's memory, personality changes    A/P at last visit  Socorro Etienne is a pleasant 53 year old female seen in follow up today.   She had previously well controlled migraines but a questionable shock liver like event? With persistent transaminitis has lead to worsening headaches.  We discussed options, previously didn't tolerate topamax, zonisamide, SNRIs.  I think CGRP medications could be on the table if these do not improve.  For now will go with plan as below.  Discussed risk/benefit, to only use triptan once daily until AST/ALT improves (in 500s now, improved per  patient, cannot see [facility] records)     Plan:  Increase propranolol 80 mg daily  Add imitrex 50 mg as needed  Future options discussed as above (would favor Nurtec e.g. or pushing propranolol higher yet)  To follow up in 3 months, reach out sooner with any issues questions or changes      Past Medical History:     Patient Active Problem List   Diagnosis    Infertility    Oligomenorrhea    Hyperprolactinemia (H24)    Obesity    Hypothyroidism    PCOS (polycystic ovarian syndrome)    Pituitary microadenoma with hyperprolactinemia (H)    Female hirsutism    Hyperlipidemia LDL goal <130    History of depression    Abdominal pain, right lower quadrant    Family history of cervical cancer    Family history of colon cancer    Iron deficiency anemia due to chronic blood loss    Vitamin D deficiency    Ovarian cyst    MTHFR gene mutation    Hiatal hernia    Morbid obesity (H)    Secondary hemochromatosis    Fatty liver    Vitamin B12 deficiency (non anemic)    Chronic constipation    Reflux esophagitis    Cervical high risk HPV (human papillomavirus) test positive    Ischemic hepatitis     Past Medical History:   Diagnosis Date    Anemia     ASCUS on Pap smear 09    + HPV 54 (low risk).  repeat pap in  was NIL    Family history of colon cancer 2/3/2014    Family history of colon cancer     Family history of colon cancer     Fibrocystic breast disease     Gastroesophageal reflux disease     Hypertension     pt stopped BP med with pregnancy    Hypothyroid     Hypothyroidism     Mild or unspecified pre-eclampsia, with delivery     PCOS (polycystic ovarian syndrome)     Prolactin increased         Past Surgical History:     Past Surgical History:   Procedure Laterality Date    ABDOMEN SURGERY          BACK SURGERY  No surgeries    cervical/lumbar history of treatment    BIOPSY BREAST Left     BREAST SURGERY      CHOLECYSTECTOMY      DILATION AND CURETTAGE SUCTION  10/19/2012    Procedure: DILATION AND  CURETTAGE SUCTION;  SUCTION D&C;  Surgeon: Muriel Purcell MD;  Location: [facility]    GENITOURINARY SURGERY      GI SURGERY      Gallbladder    HC REMOVAL GALLBLADDER  10/2003    HC TOOTH EXTRACTION W/FORCEP      LAPAROSCOPIC SALPINGECTOMY Left 2017    Procedure: LAPAROSCOPIC SALPINGECTOMY;  Surgeon: Muriel Purcell MD;  Location: [facility]    LAPAROSCOPIC SALPINGO-OOPHORECTOMY Right 2017    Procedure: LAPAROSCOPIC SALPINGO-OOPHORECTOMY;  Surgeon: Muriel Purcell MD;  Location: [facility]    LAPAROSCOPY DIAGNOSTIC (GYN)  10/25/2013    Procedure: LAPAROSCOPY DIAGNOSTIC (GYN);  DIAGNOSTIC LAPAROSCOPY;  Surgeon: Muriel Purcell MD;  Location: [facility]    OOPHORECTOMY Right     ORTHOPEDIC SURGERY  no surgeries    BRUNA wrists, RT shoulder, BRUNA ankles/feet    SALPINGOOPHORECTOMY Bilateral     SOFT TISSUE SURGERY      WISDOM TOOTH EXTRACTION      ZZC  DELIVERY ONLY      [facility] BIOPSY OF BREAST, OPEN INCISIONAL  1999    [facility] COLONOSCOPY THRU STOMA, DIAGNOSTIC      normal        Social History:     Social History     Tobacco Use    Smoking status: Never    Smokeless tobacco: Never   Vaping Use    Vaping status: Never Used   Substance Use Topics    Alcohol use: No    Drug use: No        Family History:     Family History   Problem Relation Age of Onset    Heart Disease Mother         MI @ age 53, stented    Coronary Artery Disease Mother     Hypertension Mother     Depression Mother     Anxiety Disorder Mother     Mental Illness Mother     Thyroid Disease Mother     Obesity Mother     Diabetes Mother     Hyperlipidemia Mother     Parkinsonism Mother     Cardiovascular Father         Bypass in his late 50's    Diabetes Father     Cancer - colorectal Father         in his 50's, small bowel resection    Coronary Artery Disease Father     Cerebrovascular Disease Father     Colon Cancer Father     Hypertension Sister     Cervical Cancer Sister     Thyroid Disease Sister      Psychotic Disorder Sister         bipolar    Emphysema Sister     Lupus Sister     Psychotic Disorder Brother         poss schizophrenia    Heart Disease Brother         murmur    Coronary Artery Disease Maternal Grandfather     Coronary Artery Disease Paternal Grandmother     Osteoporosis Paternal Grandmother     Alzheimer Disease Paternal Grandmother     Hypertension Maternal Half-Sister     Anxiety Disorder Maternal Half-Sister     Substance Abuse Maternal Half-Sister     Mental Illness Maternal Half-Sister     Asthma Maternal Half-Sister         Emphysema    Thyroid Disease Maternal Half-Sister     Obesity Maternal Half-Sister     Lupus Maternal Half-Sister     Emphysema Maternal Half-Sister     Cervical Cancer Maternal Half-Sister     Breast Cancer Other     Substance Abuse Other     Other Cancer Maternal Half-Sister     Anxiety Disorder Paternal Half-Brother     Mental Illness Maternal Grandmother     Alzheimer Disease Maternal Grandmother     Depression Maternal Grandmother     Asthma Son     Obesity Other     Multiple Sclerosis Other     Asthma Niece     Depression Niece     Hypertension Son     Hyperlipidemia Son     Depression Son     Anxiety Disorder Son     Asthma Son     Genetic Disorder Son         MTHFR    Thyroid Disease Son     Obesity Son     Heart Failure Maternal Uncle         Hypertrophic Obstructive Cardiomyopathy    Multiple Sclerosis Paternal Aunt     Pancreatic Cancer Paternal Aunt     Colon Cancer Cousin         colon cancer w resection    Depression Nephew     Substance Abuse Other     Genetic Disorder Other         HOCM    Multiple Sclerosis Paternal Aunt     Unknown/Adopted No family hx of         I was adopted and do not have Yifan's history        Medications:     Current Outpatient Medications   Medication Sig Dispense Refill    B-D U/F insulin pen needle       budesonide (PULMICORT) 0.5 MG/2ML neb solution       calcium carbonate-vitamin D (OSCAL) 500-5 MG-MCG tablet Take 1 tablet by  mouth 2 times daily 90 tablet 3    COMBIPATCH 0.05-0.14 MG/DAY bi-weekly patch APPLY 1 PATCH TWICE WEEKLY      cyanocobalamin (VITAMIN B-12) 500 MCG tablet Take 1 tablet (500 mcg) by mouth daily 30 tablet 6    estradiol (VAGIFEM) 10 MCG TABS vaginal tablet Place 10 mcg vaginally twice a week      fish oil-omega-3 fatty acids 1000 MG capsule Take 2 g by mouth daily      LINZESS 72 MCG capsule take 1 capsule by oral route  every day on an empty stomach at least 30 minutes before 1st meal of the day      liothyronine (CYTOMEL) 5 MCG tablet       liothyronine (CYTOMEL) 5 MCG tablet Take 1 tablet by mouth daily at 2 pm      magnesium oxide (MAG-OX) 400 MG tablet Take 1 tablet (400 mg) by mouth daily 90 tablet 3    metFORMIN (GLUCOPHAGE-XR) 500 MG 24 hr tablet Take 1,000 mg by mouth 2 times daily (with meals)       naproxen (NAPROSYN) 500 MG tablet TAKE 1 TABLET BY MOUTH AS NEEDED FOR HEADACHE 15 tablet 3    Omega 3-6-9 Fatty Acids (OMEGA 3-6-9 COMPLEX PO) Take 1 tablet by mouth daily      omeprazole (PRILOSEC) 40 MG DR capsule Take 40 mg by mouth daily      propranolol ER (INDERAL LA) 80 MG 24 hr capsule Take 1 capsule (80 mg) by mouth daily 90 capsule 3    Riboflavin 400 MG TABS Take 1 tablet by mouth daily 90 tablet 3    SUMAtriptan (IMITREX) 50 MG tablet Take 1 tablet (50 mg) by mouth at onset of headache for migraine May repeat in 2 hours. Max 4 tablets/24 hours ordinarily, max 1 per day until liver better 18 tablet 4    SYNTHROID 100 MCG tablet Take 100 mcg by mouth daily      tiZANidine (ZANAFLEX) 4 MG tablet TAKE 1 TO 2 TABLETS BY MOUTH EVERY 12 HOURS AS NEEDED FOR MUSCLE SPASMS      vitamin D3 (CHOLECALCIFEROL) 2000 units (50 mcg) tablet Take 1 tablet (2,000 Units) by mouth daily 90 tablet 3    vitamin E 400 units TABS Take 400 Units by mouth daily      WEGOVY 2.4 MG/0.75ML pen        No current facility-administered medications for this visit.        Allergies:     Allergies   Allergen Reactions    Latex Other  "(See Comments)     Irritation on skin    Venlafaxine     Zonisamide           Physical Exam:   Vitals: /79 (BP Location: Right arm, Patient Position: Sitting, Cuff Size: Adult Regular)   Pulse 86   Ht 1.549 m (5' 1\")   Wt 83.9 kg (185 lb)   LMP 03/20/2020   BMI 34.96 kg/m       Neuro:   General Appearance: No apparent distress, well-nourished, well-groomed, pleasant     Mental Status: Alert and oriented to person, place, and time. Speech fluent and comprehension intact. No dysarthria.      Cranial Nerves:   II: Visual fields: normal  III: Pupils: 3 mm, equal, round, reactive to light   III,IV,VI: Extraocular Movements: intact   V: Facial sensation: intact to light touch  VII: Facial strength: intact without asymmetry      Motor Exam:   5/5 diffusely     Sensory: intact to light touch    Coordination: no dysmetria noted    Reflexes: biceps, triceps, brachioradialis, patellar 2+ and symmetric.          Data: Pertinent prior to visit   Imaging:  Brain/ Pituitary MRI without and with contrast 12/2021     History: PITUITARY   COMPARE TO PREVIOUS; Pituitary disease (H).     ICD-10: Pituitary disease (H)     Comparison:  50 degrees the brain exams from 12/23/2020, 11/2/2019 and  10/25/2018.      Technique: Axial diffusion and FLAIR images of the whole brain  obtained without intravenous contrast. Sagittal T1 and T2-weighted,  coronal T2-weighted, coronal T1-weighted images with focus on the  sella were obtained without intravenous contrast. Post intravenous  contrast using gadolinium coronal and sagittal T1-weighted images were  obtained focused on the sella. Dynamic postcontrast coronal  T1-weighted images were also obtained.     Contrast: 10 cc Gadavist.     Findings:    No mass is demonstrated within the sella. No abnormality identified at  the site of the previously reported hypoenhancement at the right  posterior inferior aspect of the sella. The pituitary stalk appears  midline.. T1 bright spot of the " neurohypophysis is present. The optic  chiasm appears intact and not displaced.  The major cavernous carotid vascular flow-voids appear patent.       Focus of T2 hyperintensity within the left frontal deep white matter,  unchanged compared to prior study, nonspecific, differential includes  sequela of infectious inflammatory process, or vasculopathy, or  chronic small vessel ischemic disease. No mass effect, midline shift,  or significant enlargement of the ventricles.  Mucus retention cyst within the right maxillary sinus. The mastoid air  cells are clear.                                                                      Impression: No evidence of mass within the sella. No abnormality  identified at the site of the previously reported hypoenhancement at  the right posterior inferior aspect of the sella.      I personally reviewed the above imaging and agree with the findings in the report. Previously noted pituitary microadenoma is difficult to appreciate on 2021 imaging     2018 MRI--reviewed with patient with regards to small t2 hyperintensity, not pertinent/relevant.  Age expected                 Brain/ Pituitary MRI without and with contrast  2023     History: to compare/follow up on pituitary microadenoma; Pituitary  microadenoma with hyperprolactinemia (H); Pituitary microadenoma with  hyperprolactinemia (H).  ICD-10: Pituitary microadenoma with hyperprolactinemia (H); Pituitary  microadenoma with hyperprolactinemia (H)     Comparison:  Multiple studies dating back to MRI 10/25/2018      Technique: Axial diffusion and FLAIR images of the whole brain  obtained without intravenous contrast. Sagittal T1 and T2-weighted,  coronal T2-weighted, coronal T1-weighted images with focus on the  sella were obtained without intravenous contrast. Post intravenous  contrast using gadolinium coronal and sagittal T1-weighted images were  obtained focused on the sella. Dynamic postcontrast coronal  T1-weighted images were  also obtained.     Contrast: 8ml Gadavist     Findings:    No mass is demonstrated within the sella. The pituitary stalk appears  midline. The optic chiasm appears intact and not displaced.  The major cavernous carotid vascular flow-voids appear patent.       Regarding the remainder brain parenchyma; mild prominent subarachnoid  space overlying the frontal convexity, unchanged. A few scattered  nonspecific subcortical and deep white matter hyperintensities  compatible with mild leukoaraiosis.  Stable mucinous retention cyst within right maxillary sinus.                                                                      Impression: No evidence of mass within the sella with particular  attention paid to the right posterior inferior aspect of the sella.       Procedures:       Laboratory:               The longitudinal plan of care for headaches, pituitary changes was addressed during this visit. Due to the added complexity in care, I will continue to support Ms. Etienne in the subsequent management of this condition(s) and with the ongoing continuity of care of this condition(s).

## 2024-11-04 ENCOUNTER — VIRTUAL VISIT (OUTPATIENT)
Dept: ONCOLOGY | Facility: CLINIC | Age: 54
End: 2024-11-04
Attending: FAMILY MEDICINE
Payer: COMMERCIAL

## 2024-11-04 DIAGNOSIS — Z80.3 FAMILY HISTORY OF MALIGNANT NEOPLASM OF BREAST: ICD-10-CM

## 2024-11-04 DIAGNOSIS — Z80.49 FAMILY HISTORY OF UTERINE CANCER: ICD-10-CM

## 2024-11-04 DIAGNOSIS — Z80.0 FAMILY HISTORY OF COLON CANCER: Primary | ICD-10-CM

## 2024-11-04 PROCEDURE — 96040 HC GENETIC COUNSELING, EACH 30 MINUTES: CPT | Mod: TEL,95 | Performed by: GENETIC COUNSELOR, MS

## 2024-11-04 NOTE — NURSING NOTE
Current patient location:  Pt states [place] only    Is the patient currently in the state of [place]? YES    Visit mode:TELEPHONE    If the visit is dropped, the patient can be reconnected by: TELEPHONE VISIT: Phone number: [phone]    Will anyone else be joining the visit? NO  (If patient encounters technical issues they should call [phone] :[phone])    How would you like to obtain your AVS? MyChart    Are changes needed to the allergy or medication list? N/A    Are refills needed on medications prescribed by this physician? NO    Reason for visit:  CONSULT    Joy FROST

## 2024-11-04 NOTE — PROGRESS NOTES
11/4/2024    Virtual Visit Details  Type of service:  Telephone Visit   Phone call duration: 49 minutes   Originating Location (pt. Location): Home  Distant Location (provider location):  Off-site    Referring Provider: Brittanie Elkins MD    Presenting Information:   Today Socorro elected for a virtual genetic counseling visit through the Cancer Risk Management Program to discuss her family history of cancer. We reviewed this history, cancer screening recommendations, and available genetic testing options.    Personal History:  Socorro is a 54 year old female. She does not have any personal history of cancer. Of note, Socorro reports that she was found to have a pituitary microadenoma around 2009; her most recent brain MRI in August 2023 did not identify any masses/lesions.    She had her first menstrual period at age 13, her first child at age 30, and believes she went through menopause around age 51. Socorro has her uterus and left ovary in place; her right ovary and both fallopian tubes were removed in 2017. She reports that she has been taking hormone replacement therapy for the last eight years.      She has annual mammograms and her most recent mammogram in May 2024 was normal/benign. Socorro reports that she had a left-sided lumpectomy in her 20's that was benign. She reported that her most recent colonoscopy was around 2019 and may have identified two pre-cancerous polyps; follow-up was recommended in five years. Her prior colonoscopies in 2015 and 2017 did not identify any polyps. She had a thyroid ultrasound in August 2023 to follow a thyroid nodule, which was reported as stable. She does not regularly do any other cancer screening at this time.    Family History: (Please see scanned pedigree for detailed family history information)  Socorro's maternal half sister is 49 and was diagnosed with cervical cancer at age 32.  Socorro's mother is 69 and has not had a cancer.  Her four full brothers and one full  sisters are in their 50/60's and have not had a cancer.  Her paternal half brother is in his 70's and has either a kidney disease versus cancer. His son was diagnosed with colon cancer at age 47 and passed away at age 49.  Socorro's maternal grandmother passed away at age 58, possibly from an aortic aneurysm. She did not have a cancer.  One of her sisters was diagnosed with breast cancer at an unknown age and passed away.  One of her sisters was diagnosed with colon cancer at an unknown age and passed away.  One of her sisters was diagnosed with uterine cancer at an unknown age and passed away.  One of her brothers was diagnosed with colon cancer at an unknown age and passed away.  Socorro's father was diagnosed with colon cancer at age 52 and passed away at age 67 from unrelated causes.  One paternal first cousin passed away at age 51 from either liver disease or cancer; he needed a liver transplant as part of his treatment.  Socorro shared that there is limited information available regarding her other paternal relatives.  Her maternal ethnicity is English, [place], [place] citizen, Scandinavian, , I[place]ian, and [place]. Her paternal ethnicity is [place] citizen. There is no known [employer] ancestry on either side of her family.    Discussion:  We discussed the features commonly associated with hereditary cancer syndromes, and a handout regarding this was provided to Socorro today. This can be found in the after visit summary.  As discussed in our session, her close family history is absent for the following features typically seen in high risk families:   Multiple generations of relatives diagnosed with same/similar cancers   Relatives diagnosed with early-onset cancers (typically under age 50)  Relatives diagnosed with rare, bilateral, and/or multiple primary cancers   Because of the absence of these features, it is unlikely that there is a currently identifiable hereditary cancer syndrome present in Jens  close family. We also discussed that insurance coverage for genetic testing is dependent upon personal and family history being suggestive of a hereditary cancer syndrome.  We discussed that her extended family history, though, might be consistent with hereditary cancer. Specifically, her maternal cousin (through her half uncle) was diagnosed with colon cancer under age 50. Also, her maternal great aunts and uncles were diagnosed with related cancers (breast, colon, uterine). These individuals, though, are related to Socorro through multiple other family members that have not had a cancer. This means that even if these relatives did carry a genetic risk for cancer, it may not have been passed down to Socorro. Socorro verbalized understanding.  Given this history, it would be recommended that the close relatives of Socorro's cousin with early-onset colon cancer consider meeting with a genetic counselor. It would also be reasonable for Socorro's mother and her mother's full siblings to consider meeting with a genetic counselor, as well. If these individuals pursue genetic testing, Socorro would be encouraged to contact me to review the impact of their test results on Socorro.  We discussed the importance of Socorro contacting me if her personal or family history changes, though. This may modify our assessment regarding her risk for a hereditary cancer syndrome and/or genetic testing options.   Cancer screening recommendations based upon her personal/family history:  Per current National Comprehensive Cancer Network (NCCN) guidelines, individuals with a first degree relative with colorectal cancer diagnosed at any age should begin colonoscopy at age 40, or 10 years before the earliest diagnosis of colorectal cancer, whichever is first. In this family, colonoscopy should start at age 40 and be repeated every 5 years, or based on colonoscopy findings. This would apply to Socorro and her paternal half siblings.  These recommendations may change based on personal and family history as well as personal preference, and should be discussed with an individual's physician.      Other population cancer screening options, such as those recommended by the American Cancer Society and NCCN, are also appropriate for Socorro and her family. These screening recommendations may change if there are changes to Socorro's personal and/or family history of cancer. Final screening recommendations should be made in consultation with each individual's primary care provider.    Plan:  1) Socorro elected to not have genetic testing at this time.   2) Information regarding recommended cancer screening, based upon the family history, was reviewed for Socorro.  3) Socorro will contact me regularly and/or if her family or personal history changes. My contact information was provided.     Peggy Barone MS, [facility] [place]  Licensed, Certified Genetic Counselor  Office: [phone]  [email or web address]

## 2024-11-04 NOTE — LETTER
11/4/2024      Socorro Etienne  [address]  [address] [address]  [facility] [address]      Dear Colleague,    Thank you for referring your patient, Socorro Etienne, to the [facility]. Please see a copy of my visit note below.    11/4/2024    Virtual Visit Details  Type of service:  Telephone Visit   Phone call duration: 49 minutes   Originating Location (pt. Location): Home  Distant Location (provider location):  Off-site    Referring Provider: Brittanie Elkins MD    Presenting Information:   Today Socorro elected for a virtual genetic counseling visit through the Cancer Risk Management Program to discuss her family history of cancer. We reviewed this history, cancer screening recommendations, and available genetic testing options.    Personal History:  Socorro is a 54 year old female. She does not have any personal history of cancer. Of note, Socorro reports that she was found to have a pituitary microadenoma around 2009; her most recent brain MRI in August 2023 did not identify any masses/lesions.    She had her first menstrual period at age 13, her first child at age 30, and believes she went through menopause around age 51. Socorro has her uterus and left ovary in place; her right ovary and both fallopian tubes were removed in 2017. She reports that she has been taking hormone replacement therapy for the last eight years.      She has annual mammograms and her most recent mammogram in May 2024 was normal/benign. Socorro reports that she had a left-sided lumpectomy in her 20's that was benign. She reported that her most recent colonoscopy was around 2019 and may have identified two pre-cancerous polyps; follow-up was recommended in five years. Her prior colonoscopies in 2015 and 2017 did not identify any polyps. She had a thyroid ultrasound in August 2023 to follow a thyroid nodule, which was reported as stable. She does not regularly do any other cancer screening at this time.    Family  History: (Please see scanned pedigree for detailed family history information)  Socorro's maternal half sister is 49 and was diagnosed with cervical cancer at age 32.  Socorro's mother is 69 and has not had a cancer.  Her four full brothers and one full sisters are in their 50/60's and have not had a cancer.  Her paternal half brother is in his 70's and has either a kidney disease versus cancer. His son was diagnosed with colon cancer at age 47 and passed away at age 49.  Socorro's maternal grandmother passed away at age 58, possibly from an aortic aneurysm. She did not have a cancer.  One of her sisters was diagnosed with breast cancer at an unknown age and passed away.  One of her sisters was diagnosed with colon cancer at an unknown age and passed away.  One of her sisters was diagnosed with uterine cancer at an unknown age and passed away.  One of her brothers was diagnosed with colon cancer at an unknown age and passed away.  Socorro's father was diagnosed with colon cancer at age 52 and passed away at age 67 from unrelated causes.  One paternal first cousin passed away at age 51 from either liver disease or cancer; he needed a liver transplant as part of his treatment.  Socorro shared that there is limited information available regarding her other paternal relatives.  Her maternal ethnicity is English, Divehi, [place]an, Scandinavian, , Setswana, and Bangladeshi. Her paternal ethnicity is [place]. There is no known Ashkenazi Restoration ancestry on either side of her family.    Discussion:  We discussed the features commonly associated with hereditary cancer syndromes, and a handout regarding this was provided to Socorro today. This can be found in the after visit summary.  As discussed in our session, her close family history is absent for the following features typically seen in high risk families:   Multiple generations of relatives diagnosed with same/similar cancers   Relatives diagnosed with early-onset  cancers (typically under age 50)  Relatives diagnosed with rare, bilateral, and/or multiple primary cancers   Because of the absence of these features, it is unlikely that there is a currently identifiable hereditary cancer syndrome present in Socorro's close family. We also discussed that insurance coverage for genetic testing is dependent upon personal and family history being suggestive of a hereditary cancer syndrome.  We discussed that her extended family history, though, might be consistent with hereditary cancer. Specifically, her maternal cousin (through her half uncle) was diagnosed with colon cancer under age 50. Also, her maternal great aunts and uncles were diagnosed with related cancers (breast, colon, uterine). These individuals, though, are related to Socorro through multiple other family members that have not had a cancer. This means that even if these relatives did carry a genetic risk for cancer, it may not have been passed down to Socorro. Socorro verbalized understanding.  Given this history, it would be recommended that the close relatives of Socorro's cousin with early-onset colon cancer consider meeting with a genetic counselor. It would also be reasonable for Socorro's mother and her mother's full siblings to consider meeting with a genetic counselor, as well. If these individuals pursue genetic testing, Socorro would be encouraged to contact me to review the impact of their test results on Socorro.  We discussed the importance of Socorro contacting me if her personal or family history changes, though. This may modify our assessment regarding her risk for a hereditary cancer syndrome and/or genetic testing options.   Cancer screening recommendations based upon her personal/family history:  Per current National Comprehensive Cancer Network (NCCN) guidelines, individuals with a first degree relative with colorectal cancer diagnosed at any age should begin colonoscopy at age 40, or 10  years before the earliest diagnosis of colorectal cancer, whichever is first. In this family, colonoscopy should start at age 40 and be repeated every 5 years, or based on colonoscopy findings. This would apply to Socorro and her paternal half siblings. These recommendations may change based on personal and family history as well as personal preference, and should be discussed with an individual's physician.      Other population cancer screening options, such as those recommended by the American Cancer Society and NCCN, are also appropriate for Socorro and her family. These screening recommendations may change if there are changes to Socorro's personal and/or family history of cancer. Final screening recommendations should be made in consultation with each individual's primary care provider.    Plan:  1) Socorro elected to not have genetic testing at this time.   2) Information regarding recommended cancer screening, based upon the family history, was reviewed for Socorro.  3) Socorro will contact me regularly and/or if her family or personal history changes. My contact information was provided.     Peggy Barone MS, [facility]  Licensed, Certified Genetic Counselor  Office: [phone]  [email or web address]      Again, thank you for allowing me to participate in the care of your patient.        Sincerely,        Peggy Barone, CESIA

## 2024-11-05 ENCOUNTER — PATIENT OUTREACH (OUTPATIENT)
Dept: CARE COORDINATION | Facility: CLINIC | Age: 54
End: 2024-11-05
Payer: COMMERCIAL

## 2024-11-07 NOTE — PATIENT INSTRUCTIONS
Assessing Cancer Risk  Cancer is a common diagnosis which impacts many families. Individuals may develop cancer due to environmental factors (such as exposures and lifestyle), aging, genetic predisposition, or a combination of these factors. The vast majority of cancer diagnoses are considered sporadic, and not primarily due to an inherited risk factor. Approximately 5-10% of cancer diagnoses are thought to be caused by inherited risk factors.       There are several features that are likely to be present in a family that has an inherited alteration in a cancer susceptibility gene. However, this may not be the case for all families that carry a cancer risk gene, such as those with small family size or limited history information.  Cancers diagnosed at a young age (often before age 50)  Individuals with more than one primary cancer  Certain rare tumors/cancers  Multiple generations of the family affected with cancer    Categories of Cancer        Cancers may be:  Sporadic: somatic (acquired) genetic errors, environmental exposures, and lifestyle contribute to cancer as we age.  Familial: clustering of cancer in a family due to a combination of multiple genetic and shared environmental factors.  Hereditary: inherited risk for cancer, typically in a single gene.      Cancer Screening and Management  Cancer risk, as well as screening and management recommendations, are based on a combination of factors. This includes both personal and family history factors. Population cancer screening options, such as those recommended by the American Cancer Society and the National Comprehensive Cancer Network (NCCN), are appropriate for many families at average risk for cancer. However, earlier and/or more frequent screening may be recommended based on personal factors (lifestyle, exposures, medications, screening results), family history of cancer, and sometimes genetic factors. These cancer risk management options should be  discussed in more detail with an individual's medical providers.    Resources  National Society of Genetic Counselors nsgc.org   American Cancer Society cancer.org     Please call us if you have any questions or concerns.   Cancer Risk Management Program (Appointments: [phone])  Sabas No, MS, [facility] [phone]  Phylicia Spear, MS, [facility] [phone]  Marlee Acuna, MS, [facility]  [phone]  Shalonda Vail, MS, [facility]  [phone]  Opal Johansen, MS, [facility]  [phone]  Peggy Barone, MS, [facility] [phone]  Linda Lloyd, MS, [facility] [phone]

## 2024-11-19 ENCOUNTER — PATIENT OUTREACH (OUTPATIENT)
Dept: CARE COORDINATION | Facility: CLINIC | Age: 54
End: 2024-11-19
Payer: COMMERCIAL

## 2024-12-03 ENCOUNTER — MYC MEDICAL ADVICE (OUTPATIENT)
Dept: FAMILY MEDICINE | Facility: OTHER | Age: 54
End: 2024-12-03
Payer: COMMERCIAL

## 2024-12-12 ENCOUNTER — OFFICE VISIT (OUTPATIENT)
Dept: FAMILY MEDICINE | Facility: OTHER | Age: 54
End: 2024-12-12
Payer: COMMERCIAL

## 2024-12-12 VITALS
RESPIRATION RATE: 18 BRPM | BODY MASS INDEX: 35.02 KG/M2 | HEART RATE: 91 BPM | WEIGHT: 185.5 LBS | HEIGHT: 61 IN | DIASTOLIC BLOOD PRESSURE: 84 MMHG | OXYGEN SATURATION: 98 % | SYSTOLIC BLOOD PRESSURE: 119 MMHG | TEMPERATURE: 98.1 F

## 2024-12-12 DIAGNOSIS — E22.9 PITUITARY MICROADENOMA WITH HYPERPROLACTINEMIA (H): ICD-10-CM

## 2024-12-12 DIAGNOSIS — H53.9 VISION CHANGES: ICD-10-CM

## 2024-12-12 DIAGNOSIS — R79.89 ELEVATED PTHRP LEVEL: ICD-10-CM

## 2024-12-12 DIAGNOSIS — K72.00 ISCHEMIC HEPATITIS: ICD-10-CM

## 2024-12-12 DIAGNOSIS — D35.2 PITUITARY MICROADENOMA WITH HYPERPROLACTINEMIA (H): ICD-10-CM

## 2024-12-12 DIAGNOSIS — E66.01 MORBID OBESITY (H): ICD-10-CM

## 2024-12-12 DIAGNOSIS — G43.009 MIGRAINE WITHOUT AURA AND WITHOUT STATUS MIGRAINOSUS, NOT INTRACTABLE: ICD-10-CM

## 2024-12-12 DIAGNOSIS — E53.8 VITAMIN B12 DEFICIENCY (NON ANEMIC): ICD-10-CM

## 2024-12-12 DIAGNOSIS — E55.9 VITAMIN D DEFICIENCY: Primary | ICD-10-CM

## 2024-12-12 DIAGNOSIS — E83.118 SECONDARY HEMOCHROMATOSIS: ICD-10-CM

## 2024-12-12 DIAGNOSIS — E65 PANNUS, ABDOMINAL: ICD-10-CM

## 2024-12-12 DIAGNOSIS — D50.0 IRON DEFICIENCY ANEMIA DUE TO CHRONIC BLOOD LOSS: ICD-10-CM

## 2024-12-12 PROBLEM — E61.1 IRON DEFICIENCY: Status: ACTIVE | Noted: 2024-12-12

## 2024-12-12 PROBLEM — R53.83 FATIGUE: Status: ACTIVE | Noted: 2024-12-12

## 2024-12-12 LAB
ALBUMIN SERPL BCG-MCNC: 4.5 G/DL (ref 3.5–5.2)
ALP SERPL-CCNC: 79 U/L (ref 40–150)
ALT SERPL W P-5'-P-CCNC: 18 U/L (ref 0–50)
ANION GAP SERPL CALCULATED.3IONS-SCNC: 11 MMOL/L (ref 7–15)
AST SERPL W P-5'-P-CCNC: 16 U/L (ref 0–45)
BILIRUB SERPL-MCNC: 0.3 MG/DL
BUN SERPL-MCNC: 12.2 MG/DL (ref 6–20)
CALCIUM SERPL-MCNC: 9.7 MG/DL (ref 8.8–10.4)
CHLORIDE SERPL-SCNC: 101 MMOL/L (ref 98–107)
CHOLEST SERPL-MCNC: 241 MG/DL
CREAT SERPL-MCNC: 0.58 MG/DL (ref 0.51–0.95)
EGFRCR SERPLBLD CKD-EPI 2021: >90 ML/MIN/1.73M2
ERYTHROCYTE [DISTWIDTH] IN BLOOD BY AUTOMATED COUNT: 12.5 % (ref 10–15)
FASTING STATUS PATIENT QL REPORTED: NO
FASTING STATUS PATIENT QL REPORTED: NO
FERRITIN SERPL-MCNC: 436 NG/ML (ref 11–328)
GLUCOSE SERPL-MCNC: 83 MG/DL (ref 70–99)
HCO3 SERPL-SCNC: 30 MMOL/L (ref 22–29)
HCT VFR BLD AUTO: 41.5 % (ref 35–47)
HDLC SERPL-MCNC: 41 MG/DL
HGB BLD-MCNC: 13.3 G/DL (ref 11.7–15.7)
IRON BINDING CAPACITY (ROCHE): 270 UG/DL (ref 240–430)
IRON SATN MFR SERPL: 30 % (ref 15–46)
IRON SERPL-MCNC: 80 UG/DL (ref 37–145)
LDLC SERPL CALC-MCNC: 163 MG/DL
MAGNESIUM SERPL-MCNC: 1.8 MG/DL (ref 1.7–2.3)
MCH RBC QN AUTO: 29 PG (ref 26.5–33)
MCHC RBC AUTO-ENTMCNC: 32 G/DL (ref 31.5–36.5)
MCV RBC AUTO: 90 FL (ref 78–100)
NONHDLC SERPL-MCNC: 200 MG/DL
PLATELET # BLD AUTO: 275 10E3/UL (ref 150–450)
POTASSIUM SERPL-SCNC: 4.8 MMOL/L (ref 3.4–5.3)
PROT SERPL-MCNC: 6.6 G/DL (ref 6.4–8.3)
PTH-INTACT SERPL-MCNC: 22 PG/ML (ref 15–65)
RBC # BLD AUTO: 4.59 10E6/UL (ref 3.8–5.2)
SODIUM SERPL-SCNC: 142 MMOL/L (ref 135–145)
TRIGL SERPL-MCNC: 184 MG/DL
VIT B12 SERPL-MCNC: 948 PG/ML (ref 232–1245)
VIT D+METAB SERPL-MCNC: 56 NG/ML (ref 20–50)
WBC # BLD AUTO: 8.5 10E3/UL (ref 4–11)

## 2024-12-12 RX ORDER — ERGOCALCIFEROL 1.25 MG/1
50000 CAPSULE ORAL WEEKLY
COMMUNITY

## 2024-12-12 RX ORDER — FLUCONAZOLE 150 MG/1
300 TABLET ORAL DAILY
COMMUNITY
Start: 2024-12-10

## 2024-12-12 RX ORDER — FAMOTIDINE 40 MG/1
40 TABLET, FILM COATED ORAL
COMMUNITY

## 2024-12-12 ASSESSMENT — PAIN SCALES - GENERAL: PAINLEVEL_OUTOF10: NO PAIN (0)

## 2024-12-12 NOTE — PROGRESS NOTES
Assessment & Plan         ICD-10-CM    1. Vitamin D deficiency  E55.9 Vitamin D Deficiency     CBC with platelets     Vitamin D Deficiency     CBC with platelets      2. Vitamin B12 deficiency (non anemic)  E53.8 Vitamin B12     CBC with platelets     Vitamin B12     CBC with platelets      3. Pituitary microadenoma with hyperprolactinemia (H)  D35.2 CBC with platelets    E22.9 Adult Eye  Referral     CBC with platelets      4. Morbid obesity (H)  E66.01 Comprehensive metabolic panel (BMP + Alb, Alk Phos, ALT, AST, Total. Bili, TP)     Lipid panel reflex to direct LDL Fasting     CBC with platelets     Comprehensive metabolic panel (BMP + Alb, Alk Phos, ALT, AST, Total. Bili, TP)     Lipid panel reflex to direct LDL Fasting     CBC with platelets      5. Ischemic hepatitis  K72.00 CBC with platelets     CBC with platelets      6. Secondary hemochromatosis  E83.118 CBC with platelets     CBC with platelets      7. Vision changes  H53.9 CBC with platelets     Adult Eye  Referral     CBC with platelets      8. Pannus, abdominal  E65 CBC with platelets     Adult Plastic Surgery  Referral     CBC with platelets      9. Iron deficiency anemia due to chronic blood loss  D50.0 CBC with platelets     Ferritin     Iron and iron binding capacity     CBC with platelets     Ferritin     Iron and iron binding capacity      10. Elevated PTHrP level  R79.89 CBC with platelets     Parathyroid Hormone Intact     CBC with platelets     Parathyroid Hormone Intact      11. Migraine without aura and without status migrainosus, not intractable  G43.009 Magnesium     Magnesium          We had mostly discussions with the patient's today about all the issues that she has been experiencing.  We did reassure her that it is possible with the increased fibrosis and the FibroScan that it was not worthwhile biopsying this as there was an increased risk for injury and the labs were coming down.  I cannot confirm what  "his thoughts were without the GI notes and so we did request these today.  Though she is due for recheck on many labs we will get another liver set  We do replace her vitamin D and B12 and so we can change the adjustments on this depending on the results today as well as we are getting another iron lab to see if we need to change her replacement there as well.  As she takes magnesium for migraines we will check to these labs as well as her kidney function labs as occasionally she has had a little bit of a dry her kidney.  The most recent one did look to be improved though we will keep an eye on this.  As for the vision changes if she is just seeing some peripheral vision loss I do not know that this is related to anything centrally and that we should start with an optometrist or someone to check on her vision.  She is afraid that she would need a medical referral in order to get insurance to cover this as she does not have vision coverage and has not been in because of this.      I spent a total of 57 minutes on the day of the visit.   Time spent by me today doing chart review, history and exam, documentation and further activities per the note    Brittanie Elkins MD           BMI  Estimated body mass index is 35.02 kg/m  as calculated from the following:    Height as of this encounter: 1.55 m (5' 1.02\").    Weight as of this encounter: 84.1 kg (185 lb 8 oz).   Weight management plan: Discussed healthy diet and exercise guidelines          Subjective   Care O Dara is a 54 year old, presenting for the following health issues:  Results        12/12/2024     7:52 AM   Additional Questions   Roomed by rosario   Accompanied by self     History of Present Illness       CKD: She uses over the counter pain medication, including Aleve/Naproxen, one time daily.    Hyperlipidemia:  She presents for follow up of hyperlipidemia.   She is not taking medication to lower cholesterol. She is not having myalgia or other side effects to statin " medications.    Hypertension: She presents for follow up of hypertension.  She does not check blood pressure  regularly outside of the clinic. Outside blood pressures have been over 140/90. She follows a low salt diet.     Hypothyroidism:     Since last visit, patient describes the following symptoms::  Constipation, Dry skin, Fatigue and Hair loss    Reason for visit:  Check my liver, kidneys, cholesterol, and vitamins.    She eats 2-3 servings of fruits and vegetables daily.She consumes 1 sweetened beverage(s) daily.She exercises with enough effort to increase her heart rate 9 or less minutes per day.  She exercises with enough effort to increase her heart rate 3 or less days per week. She is missing 1 dose(s) of medications per week.  She is not taking prescribed medications regularly due to remembering to take.       Had episode last spring with elevated liver function testing.  We do not have episodes of this follow-up though her report is they feel it was related to hypotension causing ischemic hepatitis.  She finds it hard to believe though as she had gone into the ER at the beginning with hypertension.  She is at risk for secondary liver issues due to hemochromatosis.  Which has been hard to treat and get under control so she typically runs high and her ferritin.  She is considering a second opinion from another gastroenterologist but is not looking for referral yet.  She also is due for an update on several of her labs and vitamins that we do manage for her.  Migraines have been fairly good though we will check back on the management of these medications.  And she has been seen successful weight loss with her endocrinologist though has been starting to stabilize out and wondering when his right to take care of the pannus that has developed.  Especially since she has been continue to have some redness and inflammation in this area and topical medications use daily has been needed in order to maintain  "this  Lastly she has had vision changes that appears to be blurrier on the periphery of both sides.  She has not been in for an optometry evaluation lately but her last evaluation with ophthalmology did not find any vision changes that were concerning for potential pituitary adenoma and she would not consider these significantly changed but still unclear what is going on and would like another eye evaluation.          Review of Systems  Constitutional, HEENT, cardiovascular, pulmonary, GI, , musculoskeletal, neuro, skin, endocrine and psych systems are negative, except as otherwise noted.      Objective    /84   Pulse 91   Temp 98.1  F (36.7  C) (Temporal)   Resp 18   Ht 1.55 m (5' 1.02\")   Wt 84.1 kg (185 lb 8 oz)   LMP 03/20/2020   SpO2 98%   BMI 35.02 kg/m    Body mass index is 35.02 kg/m .  Physical Exam   GENERAL: alert and no distress  NECK: no adenopathy, no asymmetry, masses, or scars  RESP: lungs clear to auscultation - no rales, rhonchi or wheezes  CV: regular rate and rhythm, normal S1 S2, no S3 or S4, no murmur, click or rub, no peripheral edema  ABDOMEN: soft, nontender, no hepatosplenomegaly, no masses and bowel sounds normal  MS: no gross musculoskeletal defects noted, no edema  PSYCH: mentation appears normal, affect normal/bright            Signed Electronically by: Brittanie Elkins MD, MD    "

## 2025-01-03 ENCOUNTER — TRANSFERRED RECORDS (OUTPATIENT)
Dept: HEALTH INFORMATION MANAGEMENT | Facility: CLINIC | Age: 55
End: 2025-01-03

## 2025-01-03 LAB
C TRACH DNA SPEC QL PROBE+SIG AMP: NEGATIVE
HIV 1&2 EXT: NORMAL
HPV ABSTRACT: NORMAL
N GONORRHOEA DNA SPEC QL PROBE+SIG AMP: NEGATIVE
PAP-ABSTRACT: NORMAL
SPECIMEN DESCRIP: NORMAL
SPECIMEN DESCRIPTION: NORMAL

## 2025-01-05 ENCOUNTER — HEALTH MAINTENANCE LETTER (OUTPATIENT)
Age: 55
End: 2025-01-05

## 2025-01-13 DIAGNOSIS — M85.80 OSTEOPENIA, UNSPECIFIED LOCATION: ICD-10-CM

## 2025-01-13 RX ORDER — CALCIUM CARBONATE/VITAMIN D3 500MG-5MCG
1 TABLET ORAL 2 TIMES DAILY
Qty: 90 TABLET | Refills: 3 | Status: SHIPPED | OUTPATIENT
Start: 2025-01-13

## 2025-03-04 ENCOUNTER — TRANSFERRED RECORDS (OUTPATIENT)
Dept: HEALTH INFORMATION MANAGEMENT | Facility: CLINIC | Age: 55
End: 2025-03-04

## 2025-03-11 ENCOUNTER — MYC MEDICAL ADVICE (OUTPATIENT)
Dept: FAMILY MEDICINE | Facility: OTHER | Age: 55
End: 2025-03-11
Payer: COMMERCIAL

## 2025-03-12 DIAGNOSIS — E53.8 LOW SERUM VITAMIN B12: ICD-10-CM

## 2025-03-12 RX ORDER — MULTIVITAMIN WITH IRON
500 TABLET ORAL DAILY
Qty: 30 TABLET | Refills: 6 | Status: SHIPPED | OUTPATIENT
Start: 2025-03-12

## 2025-03-12 NOTE — TELEPHONE ENCOUNTER
Pending Prescriptions:                       Disp   Refills    cyanocobalamin (VITAMIN B-12) 500 MCG tab*30 tab*6            Sig: Take 1 tablet (500 mcg) by mouth daily.        Requested Pharmacy: [facility] in [place]    Pt's last office visit: 8/15/24  Next scheduled office visit: 8/14/25      Per the RN/LPN medication refill protocol, writer is unable to refill this request.

## 2025-04-02 ENCOUNTER — PATIENT OUTREACH (OUTPATIENT)
Dept: FAMILY MEDICINE | Facility: OTHER | Age: 55
End: 2025-04-02
Payer: COMMERCIAL

## 2025-04-02 PROBLEM — R87.810 CERVICAL HIGH RISK HPV (HUMAN PAPILLOMAVIRUS) TEST POSITIVE: Status: ACTIVE | Noted: 2024-04-17

## 2025-04-10 ENCOUNTER — OFFICE VISIT (OUTPATIENT)
Dept: FAMILY MEDICINE | Facility: OTHER | Age: 55
End: 2025-04-10
Payer: COMMERCIAL

## 2025-04-10 ENCOUNTER — MYC MEDICAL ADVICE (OUTPATIENT)
Dept: FAMILY MEDICINE | Facility: OTHER | Age: 55
End: 2025-04-10

## 2025-04-10 VITALS
SYSTOLIC BLOOD PRESSURE: 110 MMHG | OXYGEN SATURATION: 97 % | BODY MASS INDEX: 34.36 KG/M2 | HEIGHT: 61 IN | HEART RATE: 93 BPM | TEMPERATURE: 96.9 F | RESPIRATION RATE: 16 BRPM | DIASTOLIC BLOOD PRESSURE: 77 MMHG | WEIGHT: 182 LBS

## 2025-04-10 DIAGNOSIS — R34 DECREASED URINATION: ICD-10-CM

## 2025-04-10 DIAGNOSIS — E03.9 ACQUIRED HYPOTHYROIDISM: Primary | ICD-10-CM

## 2025-04-10 DIAGNOSIS — E22.9 PITUITARY MICROADENOMA WITH HYPERPROLACTINEMIA (H): ICD-10-CM

## 2025-04-10 DIAGNOSIS — E83.118 SECONDARY HEMOCHROMATOSIS: ICD-10-CM

## 2025-04-10 DIAGNOSIS — D35.2 PITUITARY MICROADENOMA WITH HYPERPROLACTINEMIA (H): ICD-10-CM

## 2025-04-10 DIAGNOSIS — N23 KIDNEY PAIN: ICD-10-CM

## 2025-04-10 DIAGNOSIS — N64.4 BREAST PAIN, LEFT: Primary | ICD-10-CM

## 2025-04-10 DIAGNOSIS — R10.2 PELVIC PAIN: ICD-10-CM

## 2025-04-10 LAB
ALBUMIN SERPL BCG-MCNC: 4.5 G/DL (ref 3.5–5.2)
ALBUMIN UR-MCNC: NEGATIVE MG/DL
ALP SERPL-CCNC: 75 U/L (ref 40–150)
ALT SERPL W P-5'-P-CCNC: 18 U/L (ref 0–50)
ANION GAP SERPL CALCULATED.3IONS-SCNC: 9 MMOL/L (ref 7–15)
APPEARANCE UR: CLEAR
AST SERPL W P-5'-P-CCNC: 18 U/L (ref 0–45)
BILIRUB SERPL-MCNC: 0.3 MG/DL
BILIRUB UR QL STRIP: NEGATIVE
BUN SERPL-MCNC: 16.7 MG/DL (ref 6–20)
CALCIUM SERPL-MCNC: 9.5 MG/DL (ref 8.8–10.4)
CHLORIDE SERPL-SCNC: 104 MMOL/L (ref 98–107)
COLOR UR AUTO: YELLOW
CREAT SERPL-MCNC: 0.64 MG/DL (ref 0.51–0.95)
EGFRCR SERPLBLD CKD-EPI 2021: >90 ML/MIN/1.73M2
FERRITIN SERPL-MCNC: 338 NG/ML (ref 11–328)
GLUCOSE SERPL-MCNC: 94 MG/DL (ref 70–99)
GLUCOSE UR STRIP-MCNC: NEGATIVE MG/DL
HCO3 SERPL-SCNC: 28 MMOL/L (ref 22–29)
HGB UR QL STRIP: NEGATIVE
KETONES UR STRIP-MCNC: NEGATIVE MG/DL
LEUKOCYTE ESTERASE UR QL STRIP: NEGATIVE
NITRATE UR QL: NEGATIVE
PH UR STRIP: 7 [PH] (ref 5–7)
POTASSIUM SERPL-SCNC: 4.4 MMOL/L (ref 3.4–5.3)
PROLACTIN SERPL 3RD IS-MCNC: 8 NG/ML (ref 5–23)
PROT SERPL-MCNC: 6.6 G/DL (ref 6.4–8.3)
SODIUM SERPL-SCNC: 141 MMOL/L (ref 135–145)
SP GR UR STRIP: 1.02 (ref 1–1.03)
TSH SERPL DL<=0.005 MIU/L-ACNC: 1.43 UIU/ML (ref 0.3–4.2)
UROBILINOGEN UR STRIP-ACNC: 0.2 E.U./DL

## 2025-04-10 ASSESSMENT — PAIN SCALES - GENERAL: PAINLEVEL_OUTOF10: MILD PAIN (2)

## 2025-04-10 NOTE — PROGRESS NOTES
Assessment & Plan         ICD-10-CM    1. Acquired hypothyroidism  E03.9 TSH WITH FREE T4 REFLEX     TSH WITH FREE T4 REFLEX      2. Kidney pain  N23 US Kidney Left      3. Secondary hemochromatosis  E83.118 Comprehensive metabolic panel (BMP + Alb, Alk Phos, ALT, AST, Total. Bili, TP)     Ferritin     Comprehensive metabolic panel (BMP + Alb, Alk Phos, ALT, AST, Total. Bili, TP)     Ferritin      4. Pelvic pain  R10.2 US Pelvic Complete with Transvaginal      5. Pituitary microadenoma with hyperprolactinemia (H)  D35.2 Prolactin    E22.9 Prolactin      6. Decreased urination  R34 UA Macroscopic with reflex to Microscopic and Culture - Lab Collect     UA Macroscopic with reflex to Microscopic and Culture - Lab Collect          No pain was reproduced today on her exam though she initially had pointed to the left kidney and then pointed to the left SI joint.  We did talk about musculoskeletal work and she is working with a chiropractor and feels confident in that but would like to follow-up on her previous left ovarian cyst and the left kidney as she is concerned about the urination changes as well.  We will get labs for this as well as check in on her thyroid and prolactin as she has been having more breast pains on and off as well.  As for the hemochromatosis we certainly can follow-up on her labs but have encouraged her to continue with her GI provider for this as well.  It appears to be less problematic as she is no longer cycling and she has completed her menopause which typically this is when the start to improve.  A urinalysis was ordered for evaluation of the decreased urination as well although this is likely related to trying to keep regular with hydration as the volume of urine typically is related to the volume of water taken in although she is sweatier with her hot flashes for which she is treating with her hormone replacement therapy which might be one of the reasons for having the breast twinges in  "the ovarian twinges.  We will evaluate all of these things though did talk about potentially changing her hormone replacement therapy but she is finding of symptoms that are controlled that she would rather deal with these irritants then change the medication if everything looks well           I spent a total of 40 minutes on the day of the visit.   Time spent by me today doing chart review, history and exam, documentation and further activities per the note    Brittanie Elkins MD                 Gena Quintanilla is a 54 year old, presenting for the following health issues:  Results        4/10/2025     7:55 AM   Additional Questions   Roomed by rosario   Accompanied by self     History of Present Illness       Hyperlipidemia:  She presents for follow up of hyperlipidemia.   She is not taking medication to lower cholesterol. She is not having myalgia or other side effects to statin medications.    Hypertension: She presents for follow up of hypertension.  She does not check blood pressure  regularly outside of the clinic. Outpatient blood pressures have not been over 140/90. She follows a low salt diet.     Hypothyroidism:     Since last visit, patient describes the following symptoms::  Dry skin, Fatigue, Hair loss and Weight gain    Weight gain::  5 lbs.    Headaches:   Since the patient's last clinic visit, headaches are: no change  The patient is getting headaches:  They are irregular but stable. I have had more headaches in the past few months than normal with maybe 1 per month  She is able to do normal daily activities when she has a migraine.  The patient is taking the following rescue/relief medications:  Naproxyn (Aleve) and sumatriptan (Imitrex)   Patient states \"I get total relief\" from the rescue/relief medications.   The patient is taking the following medications to prevent migraines:  Other  In the past 4 weeks, the patient has gone to an Urgent Care or Emergency Room 0 times times due to " "headaches.    Reason for visit:  Complete liver panels, possible kidney review, and follow up on previous labs/conditions    She eats 2-3 servings of fruits and vegetables daily.She consumes 0 sweetened beverage(s) daily.She exercises with enough effort to increase her heart rate 9 or less minutes per day.  She exercises with enough effort to increase her heart rate 3 or less days per week. She is missing 1 dose(s) of medications per week.  She is not taking prescribed medications regularly due to remembering to take.      Patient has been having left-sided back pain that is coming in a squeezing movement or sharp pain in her left thigh that has been bothering her and initially points to her low back but actually does have some pain into the pelvis area.  She also was concerned about her liver and why it got significantly impacted last summer though we did talk about the GI follow-up plan and they did anticipate seeing her again and she has missed this appointment.  She reports following up on her abnormal Pap smears with gynecology and had this done in December and will abstract this data for us so we can follow this.  Lastly she mentions concerns that she only urinates 3-4 times a day and feels like she used to go more and is concerned about her kidneys and how they are functioning.            Review of Systems  Constitutional, HEENT, cardiovascular, pulmonary, GI, , musculoskeletal, neuro, skin, endocrine and psych systems are negative, except as otherwise noted.      Objective    /77   Pulse 93   Temp 96.9  F (36.1  C) (Temporal)   Resp 16   Ht 1.55 m (5' 1.02\")   Wt 82.6 kg (182 lb)   LMP 03/20/2020   SpO2 97%   BMI 34.36 kg/m    Body mass index is 34.36 kg/m .  Physical Exam   GENERAL: alert and no distress  EYES: Eyes grossly normal to inspection, PERRL and conjunctivae and sclerae normal  HENT: ear canals and TM's normal, nose and mouth without ulcers or lesions  NECK: no adenopathy, no " asymmetry, masses, or scars  RESP: lungs clear to auscultation - no rales, rhonchi or wheezes  CV: regular rate and rhythm, normal S1 S2, no S3 or S4, no murmur, click or rub, no peripheral edema  ABDOMEN: soft, nontender, no hepatosplenomegaly, no masses and bowel sounds normal  MS: no gross musculoskeletal defects noted, no edema  SKIN: no suspicious lesions or rashes  NEURO: Normal strength and tone, mentation intact and speech normal  PSYCH: mentation appears normal, affect normal/bright            Signed Electronically by: Brittanie Elkins MD, MD

## 2025-04-14 DIAGNOSIS — E22.9 PITUITARY MICROADENOMA WITH HYPERPROLACTINEMIA (H): Primary | ICD-10-CM

## 2025-04-14 DIAGNOSIS — D35.2 PITUITARY MICROADENOMA WITH HYPERPROLACTINEMIA (H): Primary | ICD-10-CM

## 2025-04-30 ENCOUNTER — ANCILLARY PROCEDURE (OUTPATIENT)
Dept: MAMMOGRAPHY | Facility: CLINIC | Age: 55
End: 2025-04-30
Attending: FAMILY MEDICINE
Payer: COMMERCIAL

## 2025-04-30 DIAGNOSIS — N64.4 BREAST PAIN, LEFT: ICD-10-CM

## 2025-04-30 PROCEDURE — 77066 DX MAMMO INCL CAD BI: CPT

## 2025-04-30 PROCEDURE — G0279 TOMOSYNTHESIS, MAMMO: HCPCS

## 2025-05-15 ENCOUNTER — TELEPHONE (OUTPATIENT)
Dept: NEUROLOGY | Facility: CLINIC | Age: 55
End: 2025-05-15
Payer: COMMERCIAL

## 2025-05-15 NOTE — TELEPHONE ENCOUNTER
[facility] Task Note    TASK    Return Call  Date/time 5/15/2025 at 3:56 PM   Reason for call Refill request       FOLLOW-UP      ADDITIONAL COMMENTS  Received refill request for propanolol 80mg from [facility] in [place]. Called pharmacy as patient should have enough refills to last until August. Pharmacy stated that patient has picked up all the refills the last 2 were dispensed 2/21 and 3/12.    Called patient to discuss, but was told it was an incorrect number. Sent patient a mychart to discuss medication and phone number.    Radha Law LPN

## 2025-05-15 NOTE — LETTER
May 19, 2025      Socorro Etienne  [address]  [address] [address]  [place] [address]        Dear Socorro,     I tried to call you, but someone else answered and stated it was a wrong number. Could you please either call [phone] or send us a mychart so we can update your phone number?    We received a refill request for your propanolol from [facility] in [place], but it looks like you should have enough refills until August. Do you need more medication before then? Please either send us a mychart, or give us a call.      Sincerely,    Radha MEADE    [facility]

## (undated) DEVICE — SOL NACL 0.9% INJ 1000ML BAG 2B1324X

## (undated) DEVICE — SUCTION CANISTER MEDIVAC LINER 3000ML W/LID 65651-530

## (undated) DEVICE — ENDO TROCAR 05MM VERSASTEP VS101005

## (undated) DEVICE — SUCTION IRR TRUMPET VALVE LAP ASU1201

## (undated) DEVICE — LINEN TOWEL PACK X5 5464

## (undated) DEVICE — ESU HOLDER LAP INST DISP PURPLE LONG 330MM H-PRO-330

## (undated) DEVICE — SU VICRYL 4-0 PS-2 18" UND J496H

## (undated) DEVICE — GLOVE PROTEXIS W/NEU-THERA 6.5  2D73TE65

## (undated) DEVICE — NDL INSUFFLATION 14GA STEP S100000

## (undated) DEVICE — Device

## (undated) DEVICE — GLOVE PROTEXIS MICRO 6.0  2D73PM60

## (undated) DEVICE — ESU FCP OLYMPUS PK CUTTING 5MMX33CM PK-CF0533

## (undated) DEVICE — DRSG STERI STRIP 1/8X3" R1540

## (undated) DEVICE — SOL NACL 0.9% IRRIG 1000ML BOTTLE 07138-09

## (undated) DEVICE — ESU CORD MONOPOLAR 10'  E0510

## (undated) DEVICE — PREP DURAPREP 26ML APL 8630

## (undated) RX ORDER — ONDANSETRON 2 MG/ML
INJECTION INTRAMUSCULAR; INTRAVENOUS
Status: DISPENSED
Start: 2017-04-06

## (undated) RX ORDER — HYDROMORPHONE HYDROCHLORIDE 1 MG/ML
INJECTION, SOLUTION INTRAMUSCULAR; INTRAVENOUS; SUBCUTANEOUS
Status: DISPENSED
Start: 2017-04-06

## (undated) RX ORDER — DEXAMETHASONE SODIUM PHOSPHATE 4 MG/ML
INJECTION, SOLUTION INTRA-ARTICULAR; INTRALESIONAL; INTRAMUSCULAR; INTRAVENOUS; SOFT TISSUE
Status: DISPENSED
Start: 2017-04-06

## (undated) RX ORDER — ACETAMINOPHEN 10 MG/ML
INJECTION, SOLUTION INTRAVENOUS
Status: DISPENSED
Start: 2017-04-06

## (undated) RX ORDER — FENTANYL CITRATE 50 UG/ML
INJECTION, SOLUTION INTRAMUSCULAR; INTRAVENOUS
Status: DISPENSED
Start: 2017-04-06

## (undated) RX ORDER — KETAMINE HCL IN 0.9 % NACL 20 MG/2 ML
SYRINGE (ML) INTRAVENOUS
Status: DISPENSED
Start: 2017-04-06

## (undated) RX ORDER — PROPOFOL 10 MG/ML
INJECTION, EMULSION INTRAVENOUS
Status: DISPENSED
Start: 2017-04-06

## (undated) RX ORDER — OXYCODONE HYDROCHLORIDE 5 MG/1
TABLET ORAL
Status: DISPENSED
Start: 2017-04-06

## (undated) RX ORDER — LIDOCAINE HYDROCHLORIDE 20 MG/ML
INJECTION, SOLUTION EPIDURAL; INFILTRATION; INTRACAUDAL; PERINEURAL
Status: DISPENSED
Start: 2017-04-06

## (undated) RX ORDER — HYDROXYZINE HYDROCHLORIDE 25 MG/1
TABLET, FILM COATED ORAL
Status: DISPENSED
Start: 2017-04-06